# Patient Record
Sex: MALE | Race: WHITE | NOT HISPANIC OR LATINO | Employment: FULL TIME | URBAN - METROPOLITAN AREA
[De-identification: names, ages, dates, MRNs, and addresses within clinical notes are randomized per-mention and may not be internally consistent; named-entity substitution may affect disease eponyms.]

---

## 2018-04-30 ENCOUNTER — OFFICE VISIT CONVERTED (OUTPATIENT)
Dept: FAMILY MEDICINE CLINIC | Facility: CLINIC | Age: 53
End: 2018-04-30
Attending: NURSE PRACTITIONER

## 2018-05-14 ENCOUNTER — OFFICE VISIT CONVERTED (OUTPATIENT)
Dept: FAMILY MEDICINE CLINIC | Facility: CLINIC | Age: 53
End: 2018-05-14
Attending: NURSE PRACTITIONER

## 2018-12-13 ENCOUNTER — OFFICE VISIT CONVERTED (OUTPATIENT)
Dept: FAMILY MEDICINE CLINIC | Facility: CLINIC | Age: 53
End: 2018-12-13
Attending: NURSE PRACTITIONER

## 2019-07-09 ENCOUNTER — OFFICE VISIT CONVERTED (OUTPATIENT)
Dept: FAMILY MEDICINE CLINIC | Facility: CLINIC | Age: 54
End: 2019-07-09
Attending: NURSE PRACTITIONER

## 2019-07-09 ENCOUNTER — HOSPITAL ENCOUNTER (OUTPATIENT)
Dept: FAMILY MEDICINE CLINIC | Facility: CLINIC | Age: 54
Discharge: HOME | End: 2019-07-09
Attending: NURSE PRACTITIONER

## 2019-07-10 LAB
ALBUMIN SERPL-MCNC: 4.6 G/DL (ref 3.5–5)
ALBUMIN/GLOB SERPL: 1.5 {RATIO} (ref 1.4–2.6)
ALP SERPL-CCNC: 39 U/L (ref 56–119)
ALT SERPL-CCNC: 38 U/L (ref 10–40)
ANION GAP SERPL CALC-SCNC: 19 MMOL/L (ref 8–19)
APPEARANCE UR: CLEAR
AST SERPL-CCNC: 34 U/L (ref 15–50)
BASOPHILS # BLD AUTO: 0.03 10*3/UL (ref 0–0.2)
BASOPHILS NFR BLD AUTO: 0.5 % (ref 0–3)
BILIRUB SERPL-MCNC: 0.49 MG/DL (ref 0.2–1.3)
BILIRUB UR QL: NEGATIVE
BUN SERPL-MCNC: 13 MG/DL (ref 5–25)
BUN/CREAT SERPL: 12 {RATIO} (ref 6–20)
CALCIUM SERPL-MCNC: 9.4 MG/DL (ref 8.7–10.4)
CHLORIDE SERPL-SCNC: 95 MMOL/L (ref 99–111)
CHOLEST SERPL-MCNC: 219 MG/DL (ref 107–200)
CHOLEST/HDLC SERPL: 4.3 {RATIO} (ref 3–6)
COLOR UR: YELLOW
CONV ABS IMM GRAN: 0.01 10*3/UL (ref 0–0.2)
CONV CO2: 26 MMOL/L (ref 22–32)
CONV COLLECTION SOURCE (UA): ABNORMAL
CONV CREATININE URINE, RANDOM: 226.5 MG/DL (ref 10–300)
CONV IMMATURE GRAN: 0.2 % (ref 0–1.8)
CONV MICROALBUM.,U,RANDOM: 49.9 MG/L (ref 0–20)
CONV TOTAL PROTEIN: 7.6 G/DL (ref 6.3–8.2)
CONV UROBILINOGEN IN URINE BY AUTOMATED TEST STRIP: 1 {EHRLICHU}/DL (ref 0.1–1)
CREAT UR-MCNC: 1.11 MG/DL (ref 0.7–1.2)
DEPRECATED RDW RBC AUTO: 42.5 FL (ref 35.1–43.9)
EOSINOPHIL # BLD AUTO: 0.07 10*3/UL (ref 0–0.7)
EOSINOPHIL # BLD AUTO: 1.2 % (ref 0–7)
ERYTHROCYTE [DISTWIDTH] IN BLOOD BY AUTOMATED COUNT: 12.3 % (ref 11.6–14.4)
EST. AVERAGE GLUCOSE BLD GHB EST-MCNC: 180 MG/DL
GFR SERPLBLD BASED ON 1.73 SQ M-ARVRAT: >60 ML/MIN/{1.73_M2}
GLOBULIN UR ELPH-MCNC: 3 G/DL (ref 2–3.5)
GLUCOSE SERPL-MCNC: 151 MG/DL (ref 70–99)
GLUCOSE UR QL: 250 MG/DL
HBA1C MFR BLD: 14.1 G/DL (ref 14–18)
HBA1C MFR BLD: 7.9 % (ref 3.5–5.7)
HCT VFR BLD AUTO: 42 % (ref 42–52)
HDLC SERPL-MCNC: 51 MG/DL (ref 40–60)
HGB UR QL STRIP: NEGATIVE
KETONES UR QL STRIP: ABNORMAL MG/DL
LDLC SERPL CALC-MCNC: 123 MG/DL (ref 70–100)
LEUKOCYTE ESTERASE UR QL STRIP: NEGATIVE
LYMPHOCYTES # BLD AUTO: 2.18 10*3/UL (ref 1–5)
MCH RBC QN AUTO: 31.1 PG (ref 27–31)
MCHC RBC AUTO-ENTMCNC: 33.6 G/DL (ref 33–37)
MCV RBC AUTO: 92.7 FL (ref 80–96)
MICROALBUMIN/CREAT UR: 22 MG/G{CRE} (ref 0–25)
MONOCYTES # BLD AUTO: 0.56 10*3/UL (ref 0.2–1.2)
MONOCYTES NFR BLD AUTO: 9.6 % (ref 3–10)
NEUTROPHILS # BLD AUTO: 2.98 10*3/UL (ref 2–8)
NEUTROPHILS NFR BLD AUTO: 51.1 % (ref 30–85)
NITRITE UR QL STRIP: NEGATIVE
NRBC CBCN: 0 % (ref 0–0.7)
OSMOLALITY SERPL CALC.SUM OF ELEC: 285 MOSM/KG (ref 273–304)
PH UR STRIP.AUTO: 7 [PH] (ref 5–8)
PLATELET # BLD AUTO: 192 10*3/UL (ref 130–400)
PMV BLD AUTO: 11.3 FL (ref 9.4–12.4)
POTASSIUM SERPL-SCNC: 4 MMOL/L (ref 3.5–5.3)
PROT UR QL: NEGATIVE MG/DL
RBC # BLD AUTO: 4.53 10*6/UL (ref 4.7–6.1)
SODIUM SERPL-SCNC: 136 MMOL/L (ref 135–147)
SP GR UR: 1.02 (ref 1–1.03)
TRIGL SERPL-MCNC: 227 MG/DL (ref 40–150)
VARIANT LYMPHS NFR BLD MANUAL: 37.4 % (ref 20–45)
VLDLC SERPL-MCNC: 45 MG/DL (ref 5–37)
WBC # BLD AUTO: 5.83 10*3/UL (ref 4.8–10.8)

## 2020-01-21 ENCOUNTER — HOSPITAL ENCOUNTER (OUTPATIENT)
Dept: FAMILY MEDICINE CLINIC | Facility: CLINIC | Age: 55
Discharge: HOME | End: 2020-01-21
Attending: NURSE PRACTITIONER

## 2020-01-21 ENCOUNTER — CONVERSION ENCOUNTER (OUTPATIENT)
Dept: FAMILY MEDICINE CLINIC | Facility: CLINIC | Age: 55
End: 2020-01-21

## 2020-01-21 ENCOUNTER — OFFICE VISIT CONVERTED (OUTPATIENT)
Dept: FAMILY MEDICINE CLINIC | Facility: CLINIC | Age: 55
End: 2020-01-21
Attending: NURSE PRACTITIONER

## 2020-01-21 LAB
APPEARANCE UR: CLEAR
BILIRUB UR QL: NEGATIVE
COLOR UR: YELLOW
CONV COLLECTION SOURCE (UA): ABNORMAL
CONV UROBILINOGEN IN URINE BY AUTOMATED TEST STRIP: 1 {EHRLICHU}/DL (ref 0.1–1)
GLUCOSE UR QL: >=1000 MG/DL
HGB UR QL STRIP: NEGATIVE
KETONES UR QL STRIP: NEGATIVE MG/DL
LEUKOCYTE ESTERASE UR QL STRIP: NEGATIVE
NITRITE UR QL STRIP: NEGATIVE
PH UR STRIP.AUTO: 6.5 [PH] (ref 5–8)
PROT UR QL: ABNORMAL MG/DL
SP GR UR: 1.03 (ref 1–1.03)

## 2020-01-22 LAB
ALBUMIN SERPL-MCNC: 4.2 G/DL (ref 3.5–5)
ALBUMIN/GLOB SERPL: 1.5 {RATIO} (ref 1.4–2.6)
ALP SERPL-CCNC: 39 U/L (ref 56–119)
ALT SERPL-CCNC: 44 U/L (ref 10–40)
ANION GAP SERPL CALC-SCNC: 16 MMOL/L (ref 8–19)
APTT BLD: 24.1 S (ref 22.2–34.2)
AST SERPL-CCNC: 37 U/L (ref 15–50)
BASOPHILS # BLD AUTO: 0.02 10*3/UL (ref 0–0.2)
BASOPHILS NFR BLD AUTO: 0.3 % (ref 0–3)
BILIRUB SERPL-MCNC: 0.41 MG/DL (ref 0.2–1.3)
BUN SERPL-MCNC: 11 MG/DL (ref 5–25)
BUN/CREAT SERPL: 11 {RATIO} (ref 6–20)
CALCIUM SERPL-MCNC: 9.4 MG/DL (ref 8.7–10.4)
CHLORIDE SERPL-SCNC: 97 MMOL/L (ref 99–111)
CHOLEST SERPL-MCNC: 190 MG/DL (ref 107–200)
CHOLEST/HDLC SERPL: 4.4 {RATIO} (ref 3–6)
CONV ABS IMM GRAN: 0.01 10*3/UL (ref 0–0.2)
CONV CO2: 27 MMOL/L (ref 22–32)
CONV IMMATURE GRAN: 0.2 % (ref 0–1.8)
CONV TOTAL PROTEIN: 7 G/DL (ref 6.3–8.2)
CREAT UR-MCNC: 1.04 MG/DL (ref 0.7–1.2)
DEPRECATED RDW RBC AUTO: 38.1 FL (ref 35.1–43.9)
EOSINOPHIL # BLD AUTO: 0.12 10*3/UL (ref 0–0.7)
EOSINOPHIL # BLD AUTO: 2.1 % (ref 0–7)
ERYTHROCYTE [DISTWIDTH] IN BLOOD BY AUTOMATED COUNT: 11.7 % (ref 11.6–14.4)
EST. AVERAGE GLUCOSE BLD GHB EST-MCNC: 192 MG/DL
GFR SERPLBLD BASED ON 1.73 SQ M-ARVRAT: >60 ML/MIN/{1.73_M2}
GLOBULIN UR ELPH-MCNC: 2.8 G/DL (ref 2–3.5)
GLUCOSE SERPL-MCNC: 155 MG/DL (ref 70–99)
HBA1C MFR BLD: 8.3 % (ref 3.5–5.7)
HCT VFR BLD AUTO: 39.2 % (ref 42–52)
HDLC SERPL-MCNC: 43 MG/DL (ref 40–60)
HGB BLD-MCNC: 14.1 G/DL (ref 14–18)
INR PPP: 0.88 (ref 2–3)
LDLC SERPL CALC-MCNC: 115 MG/DL (ref 70–100)
LYMPHOCYTES # BLD AUTO: 2.03 10*3/UL (ref 1–5)
LYMPHOCYTES NFR BLD AUTO: 34.8 % (ref 20–45)
MCH RBC QN AUTO: 32.2 PG (ref 27–31)
MCHC RBC AUTO-ENTMCNC: 36 G/DL (ref 33–37)
MCV RBC AUTO: 89.5 FL (ref 80–96)
MONOCYTES # BLD AUTO: 0.44 10*3/UL (ref 0.2–1.2)
MONOCYTES NFR BLD AUTO: 7.5 % (ref 3–10)
NEUTROPHILS # BLD AUTO: 3.22 10*3/UL (ref 2–8)
NEUTROPHILS NFR BLD AUTO: 55.1 % (ref 30–85)
NRBC CBCN: 0 % (ref 0–0.7)
OSMOLALITY SERPL CALC.SUM OF ELEC: 285 MOSM/KG (ref 273–304)
PLATELET # BLD AUTO: 189 10*3/UL (ref 130–400)
PMV BLD AUTO: 11.5 FL (ref 9.4–12.4)
POTASSIUM SERPL-SCNC: 3.6 MMOL/L (ref 3.5–5.3)
PROTHROMBIN TIME: 9.7 S (ref 9.4–12)
RBC # BLD AUTO: 4.38 10*6/UL (ref 4.7–6.1)
SODIUM SERPL-SCNC: 136 MMOL/L (ref 135–147)
TRIGL SERPL-MCNC: 160 MG/DL (ref 40–150)
VLDLC SERPL-MCNC: 32 MG/DL (ref 5–37)
WBC # BLD AUTO: 5.84 10*3/UL (ref 4.8–10.8)

## 2020-05-15 ENCOUNTER — HOSPITAL ENCOUNTER (OUTPATIENT)
Dept: FAMILY MEDICINE CLINIC | Facility: CLINIC | Age: 55
Discharge: HOME | End: 2020-05-15
Attending: NURSE PRACTITIONER

## 2020-05-15 ENCOUNTER — OFFICE VISIT CONVERTED (OUTPATIENT)
Dept: FAMILY MEDICINE CLINIC | Facility: CLINIC | Age: 55
End: 2020-05-15
Attending: NURSE PRACTITIONER

## 2020-07-30 ENCOUNTER — OFFICE VISIT CONVERTED (OUTPATIENT)
Dept: FAMILY MEDICINE CLINIC | Facility: CLINIC | Age: 55
End: 2020-07-30
Attending: NURSE PRACTITIONER

## 2020-08-01 ENCOUNTER — HOSPITAL ENCOUNTER (OUTPATIENT)
Dept: FAMILY MEDICINE CLINIC | Facility: CLINIC | Age: 55
Discharge: HOME | End: 2020-08-01
Attending: NURSE PRACTITIONER

## 2020-08-01 LAB
ALBUMIN SERPL-MCNC: 4.4 G/DL (ref 3.5–5)
ALBUMIN/GLOB SERPL: 1.6 {RATIO} (ref 1.4–2.6)
ALP SERPL-CCNC: 37 U/L (ref 56–119)
ALT SERPL-CCNC: 43 U/L (ref 10–40)
ANION GAP SERPL CALC-SCNC: 15 MMOL/L (ref 8–19)
APPEARANCE UR: CLEAR
AST SERPL-CCNC: 34 U/L (ref 15–50)
BASOPHILS # BLD AUTO: 0.04 10*3/UL (ref 0–0.2)
BASOPHILS NFR BLD AUTO: 0.8 % (ref 0–3)
BILIRUB SERPL-MCNC: 0.44 MG/DL (ref 0.2–1.3)
BILIRUB UR QL: NEGATIVE
BUN SERPL-MCNC: 17 MG/DL (ref 5–25)
BUN/CREAT SERPL: 13 {RATIO} (ref 6–20)
CALCIUM SERPL-MCNC: 10 MG/DL (ref 8.7–10.4)
CHLORIDE SERPL-SCNC: 97 MMOL/L (ref 99–111)
CHOLEST SERPL-MCNC: 201 MG/DL (ref 107–200)
CHOLEST/HDLC SERPL: 4.3 {RATIO} (ref 3–6)
COLOR UR: YELLOW
CONV ABS IMM GRAN: 0.01 10*3/UL (ref 0–0.2)
CONV CO2: 27 MMOL/L (ref 22–32)
CONV COLLECTION SOURCE (UA): ABNORMAL
CONV CREATININE URINE, RANDOM: 105.2 MG/DL (ref 10–300)
CONV IMMATURE GRAN: 0.2 % (ref 0–1.8)
CONV MICROALBUM.,U,RANDOM: <12 MG/L (ref 0–20)
CONV TOTAL PROTEIN: 7.1 G/DL (ref 6.3–8.2)
CONV UROBILINOGEN IN URINE BY AUTOMATED TEST STRIP: 1 {EHRLICHU}/DL (ref 0.1–1)
CREAT UR-MCNC: 1.34 MG/DL (ref 0.7–1.2)
DEPRECATED RDW RBC AUTO: 41.1 FL (ref 35.1–43.9)
EOSINOPHIL # BLD AUTO: 0.17 10*3/UL (ref 0–0.7)
EOSINOPHIL # BLD AUTO: 3.4 % (ref 0–7)
ERYTHROCYTE [DISTWIDTH] IN BLOOD BY AUTOMATED COUNT: 12.5 % (ref 11.6–14.4)
EST. AVERAGE GLUCOSE BLD GHB EST-MCNC: 189 MG/DL
GFR SERPLBLD BASED ON 1.73 SQ M-ARVRAT: 59 ML/MIN/{1.73_M2}
GLOBULIN UR ELPH-MCNC: 2.7 G/DL (ref 2–3.5)
GLUCOSE SERPL-MCNC: 186 MG/DL (ref 70–99)
GLUCOSE UR QL: >=1000 MG/DL
HBA1C MFR BLD: 8.2 % (ref 3.5–5.7)
HCT VFR BLD AUTO: 42 % (ref 42–52)
HDLC SERPL-MCNC: 47 MG/DL (ref 40–60)
HGB BLD-MCNC: 14.4 G/DL (ref 14–18)
HGB UR QL STRIP: NEGATIVE
KETONES UR QL STRIP: NEGATIVE MG/DL
LDLC SERPL CALC-MCNC: 124 MG/DL (ref 70–100)
LEUKOCYTE ESTERASE UR QL STRIP: NEGATIVE
LYMPHOCYTES # BLD AUTO: 2.18 10*3/UL (ref 1–5)
LYMPHOCYTES NFR BLD AUTO: 43.3 % (ref 20–45)
MCH RBC QN AUTO: 31.5 PG (ref 27–31)
MCHC RBC AUTO-ENTMCNC: 34.3 G/DL (ref 33–37)
MCV RBC AUTO: 91.9 FL (ref 80–96)
MICROALBUMIN/CREAT UR: 11.4 MG/G{CRE} (ref 0–25)
MONOCYTES # BLD AUTO: 0.52 10*3/UL (ref 0.2–1.2)
MONOCYTES NFR BLD AUTO: 10.3 % (ref 3–10)
NEUTROPHILS # BLD AUTO: 2.11 10*3/UL (ref 2–8)
NEUTROPHILS NFR BLD AUTO: 42 % (ref 30–85)
NITRITE UR QL STRIP: NEGATIVE
NRBC CBCN: 0 % (ref 0–0.7)
OSMOLALITY SERPL CALC.SUM OF ELEC: 286 MOSM/KG (ref 273–304)
PH UR STRIP.AUTO: 6.5 [PH] (ref 5–8)
PLATELET # BLD AUTO: 179 10*3/UL (ref 130–400)
PMV BLD AUTO: 10.6 FL (ref 9.4–12.4)
POTASSIUM SERPL-SCNC: 4.3 MMOL/L (ref 3.5–5.3)
PROT UR QL: NEGATIVE MG/DL
RBC # BLD AUTO: 4.57 10*6/UL (ref 4.7–6.1)
SODIUM SERPL-SCNC: 135 MMOL/L (ref 135–147)
SP GR UR: 1.02 (ref 1–1.03)
T4 FREE SERPL-MCNC: 1.2 NG/DL (ref 0.9–1.8)
TRIGL SERPL-MCNC: 148 MG/DL (ref 40–150)
TSH SERPL-ACNC: 1.97 M[IU]/L (ref 0.27–4.2)
VLDLC SERPL-MCNC: 30 MG/DL (ref 5–37)
WBC # BLD AUTO: 5.03 10*3/UL (ref 4.8–10.8)

## 2021-02-09 ENCOUNTER — OFFICE VISIT CONVERTED (OUTPATIENT)
Dept: FAMILY MEDICINE CLINIC | Facility: CLINIC | Age: 56
End: 2021-02-09
Attending: NURSE PRACTITIONER

## 2021-02-20 ENCOUNTER — CONVERSION ENCOUNTER (OUTPATIENT)
Dept: FAMILY MEDICINE CLINIC | Facility: CLINIC | Age: 56
End: 2021-02-20

## 2021-02-20 ENCOUNTER — HOSPITAL ENCOUNTER (OUTPATIENT)
Dept: FAMILY MEDICINE CLINIC | Facility: CLINIC | Age: 56
Discharge: HOME | End: 2021-02-20
Attending: NURSE PRACTITIONER

## 2021-02-20 LAB
ALBUMIN SERPL-MCNC: 4.3 G/DL (ref 3.5–5)
ALBUMIN/GLOB SERPL: 1.5 {RATIO} (ref 1.4–2.6)
ALP SERPL-CCNC: 49 U/L (ref 56–119)
ALT SERPL-CCNC: 36 U/L (ref 10–40)
ANION GAP SERPL CALC-SCNC: 19 MMOL/L (ref 8–19)
APPEARANCE UR: CLEAR
AST SERPL-CCNC: 26 U/L (ref 15–50)
BASOPHILS # BLD AUTO: 0.03 10*3/UL (ref 0–0.2)
BASOPHILS NFR BLD AUTO: 0.7 % (ref 0–3)
BILIRUB SERPL-MCNC: 0.35 MG/DL (ref 0.2–1.3)
BILIRUB UR QL: NEGATIVE
BUN SERPL-MCNC: 18 MG/DL (ref 5–25)
BUN/CREAT SERPL: 17 {RATIO} (ref 6–20)
CALCIUM SERPL-MCNC: 9 MG/DL (ref 8.7–10.4)
CHLORIDE SERPL-SCNC: 98 MMOL/L (ref 99–111)
CHOLEST SERPL-MCNC: 184 MG/DL (ref 107–200)
CHOLEST/HDLC SERPL: 4.2 {RATIO} (ref 3–6)
COLOR UR: YELLOW
CONV ABS IMM GRAN: 0.01 10*3/UL (ref 0–0.2)
CONV CO2: 23 MMOL/L (ref 22–32)
CONV COLLECTION SOURCE (UA): ABNORMAL
CONV IMMATURE GRAN: 0.2 % (ref 0–1.8)
CONV TOTAL PROTEIN: 7.1 G/DL (ref 6.3–8.2)
CONV UROBILINOGEN IN URINE BY AUTOMATED TEST STRIP: 0.2 {EHRLICHU}/DL (ref 0.1–1)
CREAT UR-MCNC: 1.06 MG/DL (ref 0.7–1.2)
DEPRECATED RDW RBC AUTO: 36.3 FL (ref 35.1–43.9)
EOSINOPHIL # BLD AUTO: 0.18 10*3/UL (ref 0–0.7)
EOSINOPHIL # BLD AUTO: 3.9 % (ref 0–7)
ERYTHROCYTE [DISTWIDTH] IN BLOOD BY AUTOMATED COUNT: 11.6 % (ref 11.6–14.4)
EST. AVERAGE GLUCOSE BLD GHB EST-MCNC: 226 MG/DL
FOLATE SERPL-MCNC: 15.2 NG/ML (ref 4.8–20)
GFR SERPLBLD BASED ON 1.73 SQ M-ARVRAT: >60 ML/MIN/{1.73_M2}
GLOBULIN UR ELPH-MCNC: 2.8 G/DL (ref 2–3.5)
GLUCOSE SERPL-MCNC: 231 MG/DL (ref 70–99)
GLUCOSE UR QL: >=1000 MG/DL
HBA1C MFR BLD: 9.5 % (ref 3.5–5.7)
HCT VFR BLD AUTO: 41.7 % (ref 42–52)
HDLC SERPL-MCNC: 44 MG/DL (ref 40–60)
HGB BLD-MCNC: 15 G/DL (ref 14–18)
HGB UR QL STRIP: NEGATIVE
KETONES UR QL STRIP: NEGATIVE MG/DL
LDLC SERPL CALC-MCNC: 110 MG/DL (ref 70–100)
LEUKOCYTE ESTERASE UR QL STRIP: NEGATIVE
LYMPHOCYTES # BLD AUTO: 1.72 10*3/UL (ref 1–5)
LYMPHOCYTES NFR BLD AUTO: 37.6 % (ref 20–45)
MCH RBC QN AUTO: 31.1 PG (ref 27–31)
MCHC RBC AUTO-ENTMCNC: 36 G/DL (ref 33–37)
MCV RBC AUTO: 86.5 FL (ref 80–96)
MONOCYTES # BLD AUTO: 0.43 10*3/UL (ref 0.2–1.2)
MONOCYTES NFR BLD AUTO: 9.4 % (ref 3–10)
NEUTROPHILS # BLD AUTO: 2.21 10*3/UL (ref 2–8)
NEUTROPHILS NFR BLD AUTO: 48.2 % (ref 30–85)
NITRITE UR QL STRIP: NEGATIVE
NRBC CBCN: 0 % (ref 0–0.7)
OSMOLALITY SERPL CALC.SUM OF ELEC: 291 MOSM/KG (ref 273–304)
PH UR STRIP.AUTO: 6.5 [PH] (ref 5–8)
PLATELET # BLD AUTO: 167 10*3/UL (ref 130–400)
PMV BLD AUTO: 10.4 FL (ref 9.4–12.4)
POTASSIUM SERPL-SCNC: 3.8 MMOL/L (ref 3.5–5.3)
PROT UR QL: NEGATIVE MG/DL
PSA SERPL-MCNC: 0.46 NG/ML (ref 0–4)
RBC # BLD AUTO: 4.82 10*6/UL (ref 4.7–6.1)
SODIUM SERPL-SCNC: 136 MMOL/L (ref 135–147)
SP GR UR: 1.02 (ref 1–1.03)
TRIGL SERPL-MCNC: 152 MG/DL (ref 40–150)
VIT B12 SERPL-MCNC: 363 PG/ML (ref 211–911)
VLDLC SERPL-MCNC: 30 MG/DL (ref 5–37)
WBC # BLD AUTO: 4.58 10*3/UL (ref 4.8–10.8)

## 2021-05-09 VITALS
SYSTOLIC BLOOD PRESSURE: 150 MMHG | HEART RATE: 87 BPM | DIASTOLIC BLOOD PRESSURE: 96 MMHG | TEMPERATURE: 98 F | OXYGEN SATURATION: 98 % | WEIGHT: 212 LBS

## 2021-05-09 VITALS
HEIGHT: 67 IN | WEIGHT: 200.37 LBS | HEART RATE: 90 BPM | SYSTOLIC BLOOD PRESSURE: 104 MMHG | OXYGEN SATURATION: 98 % | BODY MASS INDEX: 31.45 KG/M2 | TEMPERATURE: 96.9 F | DIASTOLIC BLOOD PRESSURE: 68 MMHG

## 2021-05-09 VITALS
BODY MASS INDEX: 30.77 KG/M2 | TEMPERATURE: 96.7 F | HEIGHT: 68 IN | HEART RATE: 101 BPM | SYSTOLIC BLOOD PRESSURE: 120 MMHG | DIASTOLIC BLOOD PRESSURE: 84 MMHG | WEIGHT: 203 LBS | OXYGEN SATURATION: 95 %

## 2021-05-09 VITALS — SYSTOLIC BLOOD PRESSURE: 116 MMHG | DIASTOLIC BLOOD PRESSURE: 78 MMHG

## 2021-05-09 VITALS
OXYGEN SATURATION: 96 % | BODY MASS INDEX: 30.62 KG/M2 | SYSTOLIC BLOOD PRESSURE: 118 MMHG | HEART RATE: 80 BPM | WEIGHT: 202 LBS | HEIGHT: 68 IN | DIASTOLIC BLOOD PRESSURE: 78 MMHG | TEMPERATURE: 97.8 F

## 2021-05-09 VITALS
DIASTOLIC BLOOD PRESSURE: 80 MMHG | TEMPERATURE: 97.6 F | SYSTOLIC BLOOD PRESSURE: 130 MMHG | HEART RATE: 87 BPM | BODY MASS INDEX: 31.71 KG/M2 | HEIGHT: 68 IN | WEIGHT: 209.25 LBS | OXYGEN SATURATION: 98 %

## 2021-05-09 VITALS
TEMPERATURE: 97.6 F | HEART RATE: 101 BPM | WEIGHT: 207 LBS | SYSTOLIC BLOOD PRESSURE: 136 MMHG | OXYGEN SATURATION: 98 % | DIASTOLIC BLOOD PRESSURE: 86 MMHG

## 2021-05-09 VITALS
HEART RATE: 80 BPM | TEMPERATURE: 98.2 F | SYSTOLIC BLOOD PRESSURE: 128 MMHG | WEIGHT: 207 LBS | OXYGEN SATURATION: 94 % | DIASTOLIC BLOOD PRESSURE: 92 MMHG

## 2021-05-09 VITALS
TEMPERATURE: 97 F | SYSTOLIC BLOOD PRESSURE: 122 MMHG | HEART RATE: 91 BPM | HEIGHT: 68 IN | OXYGEN SATURATION: 97 % | WEIGHT: 204.37 LBS | DIASTOLIC BLOOD PRESSURE: 84 MMHG | BODY MASS INDEX: 30.97 KG/M2

## 2021-07-15 ENCOUNTER — OFFICE VISIT (OUTPATIENT)
Dept: FAMILY MEDICINE CLINIC | Facility: CLINIC | Age: 56
End: 2021-07-15

## 2021-07-15 VITALS
DIASTOLIC BLOOD PRESSURE: 74 MMHG | WEIGHT: 194 LBS | HEIGHT: 67 IN | SYSTOLIC BLOOD PRESSURE: 110 MMHG | OXYGEN SATURATION: 97 % | BODY MASS INDEX: 30.45 KG/M2 | HEART RATE: 95 BPM | TEMPERATURE: 97.5 F

## 2021-07-15 DIAGNOSIS — E11.9 ENCOUNTER FOR DIABETIC FOOT EXAM (HCC): ICD-10-CM

## 2021-07-15 DIAGNOSIS — E11.9 TYPE 2 DIABETES MELLITUS WITHOUT COMPLICATION, WITHOUT LONG-TERM CURRENT USE OF INSULIN (HCC): Primary | ICD-10-CM

## 2021-07-15 DIAGNOSIS — M21.611 BUNION OF RIGHT FOOT: ICD-10-CM

## 2021-07-15 DIAGNOSIS — I10 ESSENTIAL HYPERTENSION: ICD-10-CM

## 2021-07-15 DIAGNOSIS — E78.5 HYPERLIPIDEMIA, UNSPECIFIED HYPERLIPIDEMIA TYPE: ICD-10-CM

## 2021-07-15 DIAGNOSIS — F17.220 CHEWING TOBACCO NICOTINE DEPENDENCE WITHOUT COMPLICATION: ICD-10-CM

## 2021-07-15 PROBLEM — L85.3 DRY SKIN DERMATITIS: Status: RESOLVED | Noted: 2021-07-15 | Resolved: 2021-07-15

## 2021-07-15 PROBLEM — L85.3 DRY SKIN DERMATITIS: Status: ACTIVE | Noted: 2021-07-15

## 2021-07-15 PROBLEM — N50.9 DISORDER OF MALE GENITAL ORGANS: Status: ACTIVE | Noted: 2021-07-15

## 2021-07-15 PROBLEM — N50.9 DISORDER OF MALE GENITAL ORGANS: Status: RESOLVED | Noted: 2021-07-15 | Resolved: 2021-07-15

## 2021-07-15 LAB
ALBUMIN UR-MCNC: <1.2 MG/DL
BASOPHILS # BLD AUTO: 0.03 10*3/MM3 (ref 0–0.2)
BASOPHILS NFR BLD AUTO: 0.5 % (ref 0–1.5)
BILIRUB UR QL STRIP: NEGATIVE
CLARITY UR: CLEAR
COLOR UR: YELLOW
DEPRECATED RDW RBC AUTO: 42.5 FL (ref 37–54)
EOSINOPHIL # BLD AUTO: 0.11 10*3/MM3 (ref 0–0.4)
EOSINOPHIL NFR BLD AUTO: 1.7 % (ref 0.3–6.2)
ERYTHROCYTE [DISTWIDTH] IN BLOOD BY AUTOMATED COUNT: 12.7 % (ref 12.3–15.4)
GLUCOSE UR STRIP-MCNC: ABNORMAL MG/DL
HBA1C MFR BLD: 8.07 % (ref 4.8–5.6)
HCT VFR BLD AUTO: 42.8 % (ref 37.5–51)
HGB BLD-MCNC: 14.5 G/DL (ref 13–17.7)
HGB UR QL STRIP.AUTO: NEGATIVE
IMM GRANULOCYTES # BLD AUTO: 0.01 10*3/MM3 (ref 0–0.05)
IMM GRANULOCYTES NFR BLD AUTO: 0.2 % (ref 0–0.5)
KETONES UR QL STRIP: NEGATIVE
LEUKOCYTE ESTERASE UR QL STRIP.AUTO: NEGATIVE
LYMPHOCYTES # BLD AUTO: 2.27 10*3/MM3 (ref 0.7–3.1)
LYMPHOCYTES NFR BLD AUTO: 34.4 % (ref 19.6–45.3)
MCH RBC QN AUTO: 31 PG (ref 26.6–33)
MCHC RBC AUTO-ENTMCNC: 33.9 G/DL (ref 31.5–35.7)
MCV RBC AUTO: 91.5 FL (ref 79–97)
MONOCYTES # BLD AUTO: 0.47 10*3/MM3 (ref 0.1–0.9)
MONOCYTES NFR BLD AUTO: 7.1 % (ref 5–12)
NEUTROPHILS NFR BLD AUTO: 3.7 10*3/MM3 (ref 1.7–7)
NEUTROPHILS NFR BLD AUTO: 56.1 % (ref 42.7–76)
NITRITE UR QL STRIP: NEGATIVE
NRBC BLD AUTO-RTO: 0 /100 WBC (ref 0–0.2)
PH UR STRIP.AUTO: 6.5 [PH] (ref 5–8)
PLATELET # BLD AUTO: 175 10*3/MM3 (ref 140–450)
PMV BLD AUTO: 11 FL (ref 6–12)
PROT UR QL STRIP: NEGATIVE
RBC # BLD AUTO: 4.68 10*6/MM3 (ref 4.14–5.8)
SP GR UR STRIP: 1.02 (ref 1–1.03)
UROBILINOGEN UR QL STRIP: ABNORMAL
WBC # BLD AUTO: 6.59 10*3/MM3 (ref 3.4–10.8)

## 2021-07-15 PROCEDURE — 83036 HEMOGLOBIN GLYCOSYLATED A1C: CPT | Performed by: NURSE PRACTITIONER

## 2021-07-15 PROCEDURE — 99214 OFFICE O/P EST MOD 30 MIN: CPT | Performed by: NURSE PRACTITIONER

## 2021-07-15 PROCEDURE — 84443 ASSAY THYROID STIM HORMONE: CPT | Performed by: NURSE PRACTITIONER

## 2021-07-15 PROCEDURE — 81003 URINALYSIS AUTO W/O SCOPE: CPT | Performed by: NURSE PRACTITIONER

## 2021-07-15 PROCEDURE — 80061 LIPID PANEL: CPT | Performed by: NURSE PRACTITIONER

## 2021-07-15 PROCEDURE — 80053 COMPREHEN METABOLIC PANEL: CPT | Performed by: NURSE PRACTITIONER

## 2021-07-15 PROCEDURE — 82043 UR ALBUMIN QUANTITATIVE: CPT | Performed by: NURSE PRACTITIONER

## 2021-07-15 PROCEDURE — 85025 COMPLETE CBC W/AUTO DIFF WBC: CPT | Performed by: NURSE PRACTITIONER

## 2021-07-15 RX ORDER — HYDROCHLOROTHIAZIDE 12.5 MG/1
12.5 TABLET ORAL DAILY
Qty: 90 TABLET | Refills: 1 | Status: SHIPPED | OUTPATIENT
Start: 2021-07-15 | End: 2022-01-17

## 2021-07-15 RX ORDER — NICOTINE 21 MG/24HR
1 PATCH, TRANSDERMAL 24 HOURS TRANSDERMAL EVERY 24 HOURS
Qty: 14 PATCH | Refills: 1 | Status: SHIPPED | OUTPATIENT
Start: 2021-07-15 | End: 2022-01-20

## 2021-07-15 RX ORDER — LOVASTATIN 40 MG/1
40 TABLET ORAL
Qty: 90 TABLET | Refills: 1 | Status: SHIPPED | OUTPATIENT
Start: 2021-07-15 | End: 2022-01-17

## 2021-07-15 RX ORDER — LISINOPRIL 10 MG/1
10 TABLET ORAL DAILY
Qty: 90 TABLET | Refills: 1 | Status: SHIPPED | OUTPATIENT
Start: 2021-07-15 | End: 2022-01-17

## 2021-07-15 RX ORDER — LOVASTATIN 40 MG/1
40 TABLET ORAL
COMMUNITY
Start: 2021-05-05 | End: 2021-07-15 | Stop reason: SDUPTHER

## 2021-07-15 RX ORDER — HYDROCHLOROTHIAZIDE 25 MG/1
25 TABLET ORAL DAILY
COMMUNITY
Start: 2021-05-05 | End: 2021-07-15 | Stop reason: SDUPTHER

## 2021-07-15 RX ORDER — LISINOPRIL 10 MG/1
10 TABLET ORAL DAILY
COMMUNITY
Start: 2021-05-05 | End: 2021-07-15 | Stop reason: SDUPTHER

## 2021-07-15 RX ORDER — NICOTINE 21 MG/24HR
1 PATCH, TRANSDERMAL 24 HOURS TRANSDERMAL EVERY 24 HOURS
Qty: 14 PATCH | Refills: 0 | Status: SHIPPED | OUTPATIENT
Start: 2021-07-15 | End: 2022-01-20

## 2021-07-15 NOTE — PROGRESS NOTES
"Chief Complaint  No chief complaint on file.    PHQ-2 Total Score: 0    Subjective        Past Medical History:   Diagnosis Date   • Atypical rash    • Diabetes mellitus, type 2 (CMS/HCC)    • Disorder of male genital organs    • Dry skin dermatitis    • Elevated glucose    • Essential hypertension 07/30/2020   • Glucosuria    • Hyperlipidemia      Social History     Tobacco Use   • Smoking status: Never Smoker   • Smokeless tobacco: Current User     Types: Snuff   • Tobacco comment: 3/4 can per day; does not smoke   Vaping Use   • Vaping Use: Never used   Substance Use Topics   • Alcohol use: Yes     Comment: current some day    • Drug use: Not on file      No current outpatient medications on file prior to visit.     No current facility-administered medications on file prior to visit.      No Known Allergies   Health Maintenance Due   Topic Date Due   • COLORECTAL CANCER SCREENING  Never done   • ANNUAL PHYSICAL  Never done   • Pneumococcal Vaccine 0-64 (1 of 1 - PPSV23) Never done   • COVID-19 Vaccine (1) Never done   • Hepatitis B (1 of 3 - Risk 3-dose series) Never done   • TDAP/TD VACCINES (1 - Tdap) Never done   • ZOSTER VACCINE (1 of 2) Never done   • HEPATITIS C SCREENING  Never done      Glenroy Penaloza presents to [facility]  DM: states after his last A1C was instructed to increase to 1000mg twice daily of Metformin but noted significant diarrhea 3x/day - he is currently taking 1000mg in the morning and will occasionally take 1000mg in the evening - does not monitor glucose - no s/s of feeling of low or high glucose     HTN: BP has been lower on his most recent checks - notes 40lb weight loss over the past year - pt has reduced dietary intake    Hyperlipidemia: denies SE of medication - no muscle aches or cramps       Objective   Vital Signs:   /74   Pulse 95   Temp 97.5 °F (36.4 °C)   Ht 170.2 cm (67\")   Wt 88 kg (194 lb)   SpO2 97%   BMI 30.38 kg/m²     Review " of Systems   Cardiovascular: Negative for chest pain and palpitations.   Neurological: Negative for dizziness, light-headedness and headache.      Physical Exam  Vitals reviewed.   Constitutional:       Appearance: Normal appearance. He is well-developed.   HENT:      Head: Normocephalic and atraumatic.      Right Ear: External ear normal.      Left Ear: External ear normal.   Eyes:      Conjunctiva/sclera: Conjunctivae normal.      Pupils: Pupils are equal, round, and reactive to light.   Cardiovascular:      Rate and Rhythm: Normal rate and regular rhythm.      Pulses:           Dorsalis pedis pulses are 2+ on the right side and 2+ on the left side.      Heart sounds: No murmur heard.     Pulmonary:      Effort: Pulmonary effort is normal.      Breath sounds: Normal breath sounds. No wheezing or rhonchi.   Musculoskeletal:      Cervical back: Neck supple.      Right lower leg: No edema.      Left lower leg: No edema.      Right foot: Bunion present.      Left foot: No bunion.        Feet:    Feet:      Right foot:      Protective Sensation: 9 sites tested. 9 sites sensed.      Skin integrity: No ulcer, blister or callus.      Toenail Condition: Right toenails are normal.      Left foot:      Protective Sensation: 9 sites tested. 9 sites sensed.      Skin integrity: Skin integrity normal. No ulcer, blister or callus.      Toenail Condition: Left toenails are normal.      Comments:      Skin:     General: Skin is warm and dry.   Neurological:      Mental Status: He is alert and oriented to person, place, and time.      Cranial Nerves: No cranial nerve deficit.   Psychiatric:         Mood and Affect: Mood and affect normal.         Behavior: Behavior normal.         Thought Content: Thought content normal.         Judgment: Judgment normal.        Result Review :                 Assessment and Plan    Diagnoses and all orders for this visit:    1. Type 2 diabetes mellitus without complication, without long-term  current use of insulin (CMS/[facility]) (Primary)  -     metFORMIN (GLUCOPHAGE) 500 MG tablet; Take 2 tablets in the AM and 1 tablet in the evening  Dispense: 360 tablet; Refill: 0  -     Urinalysis With Culture If Indicated - Urine, Clean Catch  -     CBC & Differential  -     Comprehensive Metabolic Panel  -     Hemoglobin A1c  -     Lipid Panel  -     MicroAlbumin, Urine, Random - Urine, Clean Catch  -     TSH Rfx On Abnormal To Free T4    2. Essential hypertension  -     hydroCHLOROthiazide (HYDRODIURIL) 12.5 MG tablet; Take 1 tablet by mouth Daily.  Dispense: 90 tablet; Refill: 1  -     lisinopril (PRINIVIL,ZESTRIL) 10 MG tablet; Take 1 tablet by mouth Daily.  Dispense: 90 tablet; Refill: 1    3. Hyperlipidemia, unspecified hyperlipidemia type  -     lovastatin (MEVACOR) 40 MG tablet; Take 1 tablet by mouth Daily With Dinner.  Dispense: 90 tablet; Refill: 1  -     Lipid Panel    4. Chewing tobacco nicotine dependence without complication  -     nicotine (Nicoderm CQ) 21 MG/24HR patch; Place 1 patch on the skin as directed by provider Daily.  Dispense: 14 patch; Refill: 0  -     nicotine (Nicoderm CQ) 14 MG/24HR patch; Place 1 patch on the skin as directed by provider Daily.  Dispense: 14 patch; Refill: 1  -     nicotine (Nicoderm CQ) 7 MG/24HR patch; Place 1 patch on the skin as directed by provider Daily.  Dispense: 14 patch; Refill: 1    5. Bunion of right foot  -     Ambulatory Referral to Podiatry    6. Encounter for diabetic foot exam (CMS/[facility])        Follow Up   Return in about 3 months (around 10/15/2021) for Next scheduled follow up.  Patient was given instructions and counseling regarding his condition or for health maintenance advice. Please see specific information pulled into the AVS if appropriate.

## 2021-07-15 NOTE — PROGRESS NOTES
Venipuncture Blood Specimen Collection  Venipuncture performed in left arm by Rosalva Askew with good hemostasis. Patient tolerated the procedure well without complications.   07/15/21   Rosalva Askew

## 2021-07-16 LAB
ALBUMIN SERPL-MCNC: 4.4 G/DL (ref 3.5–5.2)
ALBUMIN/GLOB SERPL: 1.6 G/DL
ALP SERPL-CCNC: 39 U/L (ref 39–117)
ALT SERPL W P-5'-P-CCNC: 31 U/L (ref 1–41)
ANION GAP SERPL CALCULATED.3IONS-SCNC: 14.2 MMOL/L (ref 5–15)
AST SERPL-CCNC: 24 U/L (ref 1–40)
BILIRUB SERPL-MCNC: 0.4 MG/DL (ref 0–1.2)
BUN SERPL-MCNC: 14 MG/DL (ref 6–20)
BUN/CREAT SERPL: 12.5 (ref 7–25)
CALCIUM SPEC-SCNC: 9.2 MG/DL (ref 8.6–10.5)
CHLORIDE SERPL-SCNC: 96 MMOL/L (ref 98–107)
CHOLEST SERPL-MCNC: 184 MG/DL (ref 0–200)
CO2 SERPL-SCNC: 25.8 MMOL/L (ref 22–29)
CREAT SERPL-MCNC: 1.12 MG/DL (ref 0.76–1.27)
GFR SERPL CREATININE-BSD FRML MDRD: 68 ML/MIN/1.73
GLOBULIN UR ELPH-MCNC: 2.7 GM/DL
GLUCOSE SERPL-MCNC: 154 MG/DL (ref 65–99)
HDLC SERPL-MCNC: 47 MG/DL (ref 40–60)
LDLC SERPL CALC-MCNC: 103 MG/DL (ref 0–100)
LDLC/HDLC SERPL: 2.09 {RATIO}
POTASSIUM SERPL-SCNC: 3.4 MMOL/L (ref 3.5–5.2)
PROT SERPL-MCNC: 7.1 G/DL (ref 6–8.5)
SODIUM SERPL-SCNC: 136 MMOL/L (ref 136–145)
TRIGL SERPL-MCNC: 195 MG/DL (ref 0–150)
TSH SERPL DL<=0.05 MIU/L-ACNC: 1.27 UIU/ML (ref 0.27–4.2)
VLDLC SERPL-MCNC: 34 MG/DL (ref 5–40)

## 2021-07-27 DIAGNOSIS — I10 ESSENTIAL HYPERTENSION: ICD-10-CM

## 2021-07-27 RX ORDER — HYDROCHLOROTHIAZIDE 25 MG/1
TABLET ORAL
Qty: 90 TABLET | OUTPATIENT
Start: 2021-07-27

## 2021-07-27 RX ORDER — LISINOPRIL 10 MG/1
TABLET ORAL
Qty: 90 TABLET | Refills: 1 | OUTPATIENT
Start: 2021-07-27

## 2021-09-17 ENCOUNTER — OFFICE VISIT (OUTPATIENT)
Dept: PODIATRY | Facility: CLINIC | Age: 56
End: 2021-09-17

## 2021-09-17 VITALS
DIASTOLIC BLOOD PRESSURE: 71 MMHG | SYSTOLIC BLOOD PRESSURE: 117 MMHG | HEART RATE: 60 BPM | TEMPERATURE: 96.9 F | WEIGHT: 203 LBS | HEIGHT: 68 IN | BODY MASS INDEX: 30.77 KG/M2 | OXYGEN SATURATION: 100 %

## 2021-09-17 DIAGNOSIS — M20.11 HALLUX VALGUS OF RIGHT FOOT: ICD-10-CM

## 2021-09-17 DIAGNOSIS — M20.41 HAMMER TOE OF RIGHT FOOT: ICD-10-CM

## 2021-09-17 DIAGNOSIS — M79.671 FOOT PAIN, RIGHT: Primary | ICD-10-CM

## 2021-09-17 PROCEDURE — 99203 OFFICE O/P NEW LOW 30 MIN: CPT | Performed by: PODIATRIST

## 2021-09-17 PROCEDURE — 73630 X-RAY EXAM OF FOOT: CPT | Performed by: PODIATRIST

## 2021-09-17 NOTE — PROGRESS NOTES
[facility] - PODIATRY    Today's Date: 09/17/21    Patient Name: Glenroy Penaloza  MRN: [id number]  CSN: [id number]  PCP: Rosie Vela APRN  Referring Provider: Rosie Vela APRN    SUBJECTIVE     Chief Complaint   Patient presents with   • Right Foot - Bunions     HPI: Glenroy Penaloza, a 56 y.o.male, presents to clinic.    New, Established, New Problem: New    Location: Right first ray bunion and right second hammertoe    Duration: Summer 2020    Onset: Insidious    Nature: Minimal pain    Stable, worsening, improving: Stable    Aggravating factors:  Patient relates pain is aggravated by shoe gear and ambulation.      Previous Treatment: Change in shoes    Patient denies any fevers, chills, nausea, vomiting, shortness of breath, nor any other constitutional signs nor symptoms.    No other pedal complaints at this time.    Recent medical changes: None    Past Medical History:   Diagnosis Date   • Atypical rash    • Diabetes mellitus, type 2 (CMS/HCC)    • Disorder of male genital organs    • Dry skin dermatitis    • Elevated glucose    • Essential hypertension 07/30/2020   • Glucosuria    • Hyperlipidemia      History reviewed. No pertinent surgical history.  Family History   Problem Relation Age of Onset   • Cancer Other      Social History     Socioeconomic History   • Marital status:      Spouse name: Not on file   • Number of children: Not on file   • Years of education: Not on file   • Highest education level: Not on file   Tobacco Use   • Smoking status: Never Smoker   • Smokeless tobacco: Current User     Types: Snuff   • Tobacco comment: 3/4 can per day; does not smoke   Vaping Use   • Vaping Use: Never used   Substance and Sexual Activity   • Alcohol use: Yes     Comment: current some day    • Drug use: Never     No Known Allergies  Current Outpatient Medications   Medication Sig Dispense Refill   • hydroCHLOROthiazide (HYDRODIURIL) 12.5 MG tablet Take 1 tablet by mouth Daily. 90  tablet 1   • lisinopril (PRINIVIL,ZESTRIL) 10 MG tablet Take 1 tablet by mouth Daily. 90 tablet 1   • lovastatin (MEVACOR) 40 MG tablet Take 1 tablet by mouth Daily With Dinner. 90 tablet 1   • metFORMIN (GLUCOPHAGE) 500 MG tablet Take 2 tablets in the AM and 1 tablet in the evening 360 tablet 0   • nicotine (Nicoderm CQ) 14 MG/24HR patch Place 1 patch on the skin as directed by provider Daily. 14 patch 1   • nicotine (Nicoderm CQ) 21 MG/24HR patch Place 1 patch on the skin as directed by provider Daily. 14 patch 0   • nicotine (Nicoderm CQ) 7 MG/24HR patch Place 1 patch on the skin as directed by provider Daily. 14 patch 1     No current facility-administered medications for this visit.     Review of Systems   Musculoskeletal:        Right foot first MPJ bunion and right second hammertoe       OBJECTIVE     Vitals:    09/17/21 0754   BP: 117/71   Pulse: 60   Temp: 96.9 °F (36.1 °C)   SpO2: 100%       Patient seen in no apparent distress.      PHYSICAL EXAM:     Foot/Ankle Exam:       General:   Diabetic Foot Exam Performed    Appearance: appears stated age and healthy    Orientation: AAOx3    Affect: appropriate    Gait: unimpaired    Shoe Gear:  Casual shoes    VASCULAR      Right Foot Vascularity   Normal vascular exam    Dorsalis pedis:  2+  Posterior tibial:  2+  Skin Temperature: warm    Edema Grading:  None  CFT:  < 3 seconds  Pedal Hair Growth:  Present  Varicosities: none       Left Foot Vascularity   Normal vascular exam    Dorsalis pedis:  2+  Posterior tibial:  2+  Skin Temperature: warm    Edema Grading:  None  CFT:  < 3 seconds  Pedal Hair Growth:  Present  Varicosities: none        NEUROLOGIC     Right Foot Neurologic   Normal sensation    Light touch sensation:  Normal  Vibratory sensation:  Normal  Hot/Cold sensation: normal    Protective Sensation using [place]-Chris Monofilament:  10     Left Foot Neurologic   Normal sensation    Light touch sensation:  Normal  Vibratory sensation:   Normal  Hot/cold sensation: normal    Protective Sensation using [place]Chris Monofilament:  10     MUSCULOSKELETAL      Right Foot Musculoskeletal   Hammertoe:  Second toe  Hallux valgus: Yes       MUSCLE STRENGTH     Right Foot Muscle Strength   Normal strength    Foot dorsiflexion:  5  Foot plantar flexion:  5  Foot inversion:  5  Foot eversion:  5     Left Foot Muscle Strength   Foot dorsiflexion:  5  Foot plantar flexion:  5  Foot inversion:  5  Foot eversion:  5     RANGE OF MOTION      Right Foot Range of Motion   Foot and ankle ROM within normal limits       Left Foot Range of Motion   Foot and ankle ROM within normal limits       DERMATOLOGIC     Right Foot Dermatologic   Skin: skin intact    Nails: normal       Left Foot Dermatologic   Skin: skin intact    Nails: normal        RADIOLOGY:      XR Foot 3+ View Right    Result Date: 9/17/2021  Narrative: IN-OFFICE IMAGING:  3 view, AP, MO, Lateral, right foot Indication: Bunion pain Findings: Medial deviation of the first metatarsal with associated lateral deviation of the hallux at the metatarsal phalangeal joint.  First IM angle of 17 degrees.  Possible 2.5 degrees.  Anterior cyma line break seen.  Pez planus deformity seen.  Small plantar and posterior calcaneal enthesopathy.  No periosteal reactions nor osteolytic changes seen.  No occult fractures seen. Comparison: Comparison views available.       ASSESSMENT/PLAN     Diagnoses and all orders for this visit:    1. Foot pain, right (Primary)    2. Hammer toe of right foot    3. Hallux valgus of right foot        Comprehensive lower extremity examination and evaluation was performed.    Discussed findings and treatment plan including risks, benefits, and treatment options with patient in detail. Patient agreed with treatment plan.    Planned procedure: Right first base wedge bunionectomy and right second hammertoe correction.  Upon discussion of surgical correction, post-operative requirements along  with risk and benefits of the surgery, the patient states they do not want to pursue surgery at this time.      An After Visit Summary was printed and given to the patient at discharge, including (if requested) any available informative/educational handouts regarding diagnosis, treatment, or medications. All questions were answered to patient/family satisfaction. Should symptoms fail to improve or worsen they agree to call or return to clinic or to go to the Emergency Department. Discussed the importance of following up with any needed screening tests/labs/specialist appointments and any requested follow-up recommended by me today. Importance of maintaining follow-up discussed and patient accepts that missed appointments can delay diagnosis and potentially lead to worsening of conditions.    Return in about 6 months (around 3/17/2022), or if symptoms worsen or fail to improve, for Podiatry Diabetic Foot Exam., or sooner if acute issues arise.    This document has been electronically signed by Martin Crawley DPM on September 17, 2021 08:54 EDT

## 2021-10-18 DIAGNOSIS — E11.9 TYPE 2 DIABETES MELLITUS WITHOUT COMPLICATION, WITHOUT LONG-TERM CURRENT USE OF INSULIN (HCC): ICD-10-CM

## 2022-01-08 ENCOUNTER — TELEPHONE (OUTPATIENT)
Dept: FAMILY MEDICINE CLINIC | Facility: CLINIC | Age: 57
End: 2022-01-08

## 2022-01-08 DIAGNOSIS — E11.9 TYPE 2 DIABETES MELLITUS WITHOUT COMPLICATION, WITHOUT LONG-TERM CURRENT USE OF INSULIN: ICD-10-CM

## 2022-01-08 NOTE — TELEPHONE ENCOUNTER
Patient is needing metformin sent in for 1 month, that is when he will be due for all refills he says. He takes metformin 500mg  2 pills in the morning, 1 pill in the evening. Please send to [facility]. --KIRSTIE

## 2022-01-15 DIAGNOSIS — E78.5 HYPERLIPIDEMIA, UNSPECIFIED HYPERLIPIDEMIA TYPE: ICD-10-CM

## 2022-01-15 DIAGNOSIS — I10 ESSENTIAL HYPERTENSION: ICD-10-CM

## 2022-01-17 RX ORDER — LISINOPRIL 10 MG/1
TABLET ORAL
Qty: 30 TABLET | Refills: 0 | Status: SHIPPED | OUTPATIENT
Start: 2022-01-17 | End: 2022-01-20

## 2022-01-17 RX ORDER — LOVASTATIN 40 MG/1
TABLET ORAL
Qty: 30 TABLET | Refills: 0 | Status: SHIPPED | OUTPATIENT
Start: 2022-01-17 | End: 2022-01-20 | Stop reason: SDUPTHER

## 2022-01-17 RX ORDER — HYDROCHLOROTHIAZIDE 12.5 MG/1
TABLET ORAL
Qty: 30 TABLET | Refills: 0 | Status: SHIPPED | OUTPATIENT
Start: 2022-01-17 | End: 2022-01-20

## 2022-01-20 ENCOUNTER — OFFICE VISIT (OUTPATIENT)
Dept: FAMILY MEDICINE CLINIC | Facility: CLINIC | Age: 57
End: 2022-01-20

## 2022-01-20 VITALS
DIASTOLIC BLOOD PRESSURE: 82 MMHG | HEART RATE: 96 BPM | TEMPERATURE: 97.3 F | WEIGHT: 209 LBS | HEIGHT: 68 IN | OXYGEN SATURATION: 94 % | SYSTOLIC BLOOD PRESSURE: 118 MMHG | BODY MASS INDEX: 31.67 KG/M2

## 2022-01-20 DIAGNOSIS — E11.9 TYPE 2 DIABETES MELLITUS WITHOUT COMPLICATION, WITHOUT LONG-TERM CURRENT USE OF INSULIN: Primary | ICD-10-CM

## 2022-01-20 DIAGNOSIS — I10 ESSENTIAL HYPERTENSION: ICD-10-CM

## 2022-01-20 DIAGNOSIS — Z12.11 ENCOUNTER FOR SCREENING FOR MALIGNANT NEOPLASM OF COLON: ICD-10-CM

## 2022-01-20 DIAGNOSIS — E78.5 HYPERLIPIDEMIA, UNSPECIFIED HYPERLIPIDEMIA TYPE: ICD-10-CM

## 2022-01-20 DIAGNOSIS — Z11.59 NEED FOR HEPATITIS C SCREENING TEST: ICD-10-CM

## 2022-01-20 DIAGNOSIS — R74.8 ELEVATED LIVER ENZYMES: ICD-10-CM

## 2022-01-20 PROCEDURE — 99214 OFFICE O/P EST MOD 30 MIN: CPT | Performed by: NURSE PRACTITIONER

## 2022-01-20 RX ORDER — METFORMIN HYDROCHLORIDE 500 MG/1
1000 TABLET, EXTENDED RELEASE ORAL
Qty: 180 TABLET | Refills: 1 | Status: SHIPPED | OUTPATIENT
Start: 2022-01-20 | End: 2022-02-11

## 2022-01-20 RX ORDER — LOVASTATIN 40 MG/1
40 TABLET ORAL
Qty: 90 TABLET | Refills: 1 | Status: SHIPPED | OUTPATIENT
Start: 2022-01-20 | End: 2022-06-10 | Stop reason: HOSPADM

## 2022-01-20 RX ORDER — LISINOPRIL AND HYDROCHLOROTHIAZIDE 12.5; 1 MG/1; MG/1
1 TABLET ORAL DAILY
Qty: 90 TABLET | Refills: 1 | Status: SHIPPED | OUTPATIENT
Start: 2022-01-20 | End: 2022-06-17

## 2022-01-20 NOTE — PROGRESS NOTES
Chief Complaint  Hyperlipidemia (REFILLS) and Hypertension    Subjective        Past Medical History:   Diagnosis Date   • Atypical rash    • Diabetes mellitus, type 2 (HCC)    • Disorder of male genital organs    • Dry skin dermatitis    • Elevated glucose    • Essential hypertension 07/30/2020   • Glucosuria    • Hyperlipidemia      Social History     Tobacco Use   • Smoking status: Never Smoker   • Smokeless tobacco: Current User     Types: Snuff   • Tobacco comment: 3/4 can per day; does not smoke   Vaping Use   • Vaping Use: Never used   Substance Use Topics   • Alcohol use: Yes     Comment: current some day    • Drug use: Never      Current Outpatient Medications on File Prior to Visit   Medication Sig   • [DISCONTINUED] hydroCHLOROthiazide (HYDRODIURIL) 12.5 MG tablet TAKE ONE TABLET BY MOUTH EVERY DAY   • [DISCONTINUED] lisinopril (PRINIVIL,ZESTRIL) 10 MG tablet TAKE ONE TABLET BY MOUTH EVERY DAY   • [DISCONTINUED] lovastatin (MEVACOR) 40 MG tablet TAKE ONE TABLET BY MOUTH DAILY WITH DINNER   • [DISCONTINUED] metFORMIN (GLUCOPHAGE) 500 MG tablet TAKE TWO TABLETS BY MOUTH IN THE MORNING AND ONE TABLET BY MOUTH IN THE EVENING   • [DISCONTINUED] nicotine (Nicoderm CQ) 14 MG/24HR patch Place 1 patch on the skin as directed by provider Daily.   • [DISCONTINUED] nicotine (Nicoderm CQ) 21 MG/24HR patch Place 1 patch on the skin as directed by provider Daily.   • [DISCONTINUED] nicotine (Nicoderm CQ) 7 MG/24HR patch Place 1 patch on the skin as directed by provider Daily.     No current facility-administered medications on file prior to visit.      No Known Allergies   Health Maintenance Due   Topic Date Due   • COLORECTAL CANCER SCREENING  Never done   • ANNUAL PHYSICAL  Never done   • COVID-19 Vaccine (1) Never done   • Pneumococcal Vaccine 0-64 (1 of 2 - PPSV23) Never done   • Hepatitis B (1 of 3 - Risk 3-dose series) Never done   • TDAP/TD VACCINES (1 - Tdap) Never done   • ZOSTER VACCINE (1 of 2) Never done   •  "HEPATITIS C SCREENING  Never done   • INFLUENZA VACCINE  Never done   • HEMOGLOBIN A1C  01/15/2022      Glenroy Penaloza presents to [facility]  Pt in the office. Visit completed using [employer] Video call due to Provider being at home. No tobacco dip since July 31, 2021. Pt states he has been eating more. Used nicotine patches to help quit.     DM: does not monitor glucose. Pt states that he will have up to 3 episodes of diarrhea daily, but states some days he will have no diarrhea. Diarrhea worsened after increasing metformin dose. Pt states his diet has not been great through the holidays and after quitting dipping.     HTN: stable in office.     Hyperlipidemia: No issues with medication.     Received Covid vaccine at [facility] in [place], [place]    Pt has not had a colonoscopy in the past. Denies changes in bowel habits.       Objective   Vital Signs:   /82   Pulse 96   Temp 97.3 °F (36.3 °C)   Ht 172.7 cm (68\")   Wt 94.8 kg (209 lb)   SpO2 94%   BMI 31.78 kg/m²     Review of Systems   Constitutional: Negative for diaphoresis.   Cardiovascular: Negative for chest pain and palpitations.   Gastrointestinal: Positive for diarrhea. Negative for blood in stool and constipation.   Neurological: Negative for dizziness, light-headedness and headache.      Physical Exam  Vitals reviewed.   Constitutional:       General: He is not in acute distress.     Appearance: Normal appearance. He is well-developed.   Skin:     Coloration: Skin is not pale.   Neurological:      Mental Status: He is alert and oriented to person, place, and time.   Psychiatric:         Mood and Affect: Mood and affect normal.         Behavior: Behavior normal.         Thought Content: Thought content normal.         Judgment: Judgment normal.        Result Review :   The following data was reviewed by: JANETT Cabezas on 01/20/2022:  CMP    CMP 2/20/21 7/15/21   Glucose 231 (A) 154 (A)   BUN 18 14 "   Creatinine 1.06 1.12   eGFR Non African Am  68   Sodium 136 136   Potassium 3.8 3.4 (A)   Chloride 98 (A) 96 (A)   Calcium 9.0 9.2   Albumin 4.3 4.40   Total Bilirubin 0.35 0.4   Alkaline Phosphatase 49 (A) 39   AST (SGOT) 26 24   ALT (SGPT) 36 31   (A) Abnormal value            Lipid Panel    Lipid Panel 2/20/21 7/15/21   Total Cholesterol  184   Total Cholesterol 184    Triglycerides 152 (A) 195 (A)   HDL Cholesterol 44 47   VLDL Cholesterol 30 34   LDL Cholesterol  110 (A) 103 (A)   LDL/HDL Ratio  2.09   (A) Abnormal value       Comments are available for some flowsheets but are not being displayed.                         Assessment and Plan {CC Problem List  Visit Diagnosis   ROS  Review (Popup)  [employer] Maintenance  Quality  BestPractice  Medications  SmartSets  SnapShot Encounters  Media :23}   Diagnoses and all orders for this visit:    1. Type 2 diabetes mellitus without complication, without long-term current use of insulin (HCC) (Primary)  Comments:  Change to Metformin ER 1000mg daily. Notify with no improvement in diarrhea.   Orders:  -     Urinalysis With Culture If Indicated -  -     CBC & Differential  -     Comprehensive Metabolic Panel  -     Hemoglobin A1c  -     Cancel: MicroAlbumin, Urine, Random - Urine, Clean Catch  -     Cancel: TSH Rfx On Abnormal To Free T4  -     metFORMIN ER (GLUCOPHAGE-XR) 500 MG 24 hr tablet; Take 2 tablets by mouth Daily With Breakfast.  Dispense: 180 tablet; Refill: 1    2. Essential hypertension  Comments:  Change to Lisinopril-HCTZ 10-12.5mg.  Orders:  -     lisinopril-hydrochlorothiazide (PRINZIDE,ZESTORETIC) 10-12.5 MG per tablet; Take 1 tablet by mouth Daily.  Dispense: 90 tablet; Refill: 1    3. Hyperlipidemia, unspecified hyperlipidemia type  -     lovastatin (MEVACOR) 40 MG tablet; Take 1 tablet by mouth Daily With Dinner.  Dispense: 90 tablet; Refill: 1  -     Lipid Panel    4. Encounter for screening for malignant neoplasm of colon  -      Ambulatory Referral For Screening Colonoscopy    5. Need for hepatitis C screening test  -     Hepatitis C Antibody        Follow Up   Return in about 6 months (around 7/20/2022).  Patient was given instructions and counseling regarding his condition or for health maintenance advice. Please see specific information pulled into the AVS if appropriate.

## 2022-01-22 ENCOUNTER — CLINICAL SUPPORT (OUTPATIENT)
Dept: FAMILY MEDICINE CLINIC | Facility: CLINIC | Age: 57
End: 2022-01-22

## 2022-01-22 DIAGNOSIS — I10 ESSENTIAL HYPERTENSION: ICD-10-CM

## 2022-01-22 DIAGNOSIS — E78.5 HYPERLIPIDEMIA, UNSPECIFIED HYPERLIPIDEMIA TYPE: ICD-10-CM

## 2022-01-22 PROCEDURE — 85025 COMPLETE CBC W/AUTO DIFF WBC: CPT | Performed by: NURSE PRACTITIONER

## 2022-01-22 PROCEDURE — 80053 COMPREHEN METABOLIC PANEL: CPT | Performed by: NURSE PRACTITIONER

## 2022-01-22 PROCEDURE — 80061 LIPID PANEL: CPT | Performed by: NURSE PRACTITIONER

## 2022-01-22 PROCEDURE — 36415 COLL VENOUS BLD VENIPUNCTURE: CPT | Performed by: NURSE PRACTITIONER

## 2022-01-22 PROCEDURE — 83036 HEMOGLOBIN GLYCOSYLATED A1C: CPT | Performed by: NURSE PRACTITIONER

## 2022-01-22 PROCEDURE — 81001 URINALYSIS AUTO W/SCOPE: CPT | Performed by: NURSE PRACTITIONER

## 2022-01-22 PROCEDURE — 86803 HEPATITIS C AB TEST: CPT | Performed by: NURSE PRACTITIONER

## 2022-01-22 NOTE — PROGRESS NOTES
Venipuncture Blood Specimen Collection  Venipuncture performed in left arm by Jimenez Patino with good hemostasis. Patient tolerated the procedure well without complications.   01/22/22   Jimenez Patino

## 2022-01-23 LAB
ALBUMIN SERPL-MCNC: 4.4 G/DL (ref 3.5–5.2)
ALBUMIN/GLOB SERPL: 1.4 G/DL
ALP SERPL-CCNC: 55 U/L (ref 39–117)
ALT SERPL W P-5'-P-CCNC: 150 U/L (ref 1–41)
ANION GAP SERPL CALCULATED.3IONS-SCNC: 8.1 MMOL/L (ref 5–15)
AST SERPL-CCNC: 118 U/L (ref 1–40)
BACTERIA UR QL AUTO: NORMAL /HPF
BASOPHILS # BLD AUTO: 0.02 10*3/MM3 (ref 0–0.2)
BASOPHILS NFR BLD AUTO: 0.4 % (ref 0–1.5)
BILIRUB SERPL-MCNC: 0.4 MG/DL (ref 0–1.2)
BILIRUB UR QL STRIP: NEGATIVE
BUN SERPL-MCNC: 15 MG/DL (ref 6–20)
BUN/CREAT SERPL: 13.9 (ref 7–25)
CALCIUM SPEC-SCNC: 9.5 MG/DL (ref 8.6–10.5)
CHLORIDE SERPL-SCNC: 100 MMOL/L (ref 98–107)
CHOLEST SERPL-MCNC: 194 MG/DL (ref 0–200)
CLARITY UR: ABNORMAL
CO2 SERPL-SCNC: 28.9 MMOL/L (ref 22–29)
COD CRY URNS QL: NORMAL /HPF
COLOR UR: ABNORMAL
CREAT SERPL-MCNC: 1.08 MG/DL (ref 0.76–1.27)
DEPRECATED RDW RBC AUTO: 40.6 FL (ref 37–54)
EOSINOPHIL # BLD AUTO: 0.11 10*3/MM3 (ref 0–0.4)
EOSINOPHIL NFR BLD AUTO: 2.3 % (ref 0.3–6.2)
ERYTHROCYTE [DISTWIDTH] IN BLOOD BY AUTOMATED COUNT: 12.2 % (ref 12.3–15.4)
GFR SERPL CREATININE-BSD FRML MDRD: 70 ML/MIN/1.73
GLOBULIN UR ELPH-MCNC: 3.2 GM/DL
GLUCOSE SERPL-MCNC: 227 MG/DL (ref 65–99)
GLUCOSE UR STRIP-MCNC: ABNORMAL MG/DL
HBA1C MFR BLD: 9.9 % (ref 4.8–5.6)
HCT VFR BLD AUTO: 45.9 % (ref 37.5–51)
HCV AB SER DONR QL: NORMAL
HDLC SERPL-MCNC: 42 MG/DL (ref 40–60)
HGB BLD-MCNC: 15.6 G/DL (ref 13–17.7)
HGB UR QL STRIP.AUTO: NEGATIVE
HYALINE CASTS UR QL AUTO: NORMAL /LPF
KETONES UR QL STRIP: ABNORMAL
LDLC SERPL CALC-MCNC: 125 MG/DL (ref 0–100)
LDLC/HDLC SERPL: 2.9 {RATIO}
LEUKOCYTE ESTERASE UR QL STRIP.AUTO: NEGATIVE
LYMPHOCYTES # BLD AUTO: 2.15 10*3/MM3 (ref 0.7–3.1)
LYMPHOCYTES NFR BLD AUTO: 45.7 % (ref 19.6–45.3)
MCH RBC QN AUTO: 31.2 PG (ref 26.6–33)
MCHC RBC AUTO-ENTMCNC: 34 G/DL (ref 31.5–35.7)
MCV RBC AUTO: 91.8 FL (ref 79–97)
MONOCYTES # BLD AUTO: 0.63 10*3/MM3 (ref 0.1–0.9)
MONOCYTES NFR BLD AUTO: 13.4 % (ref 5–12)
NEUTROPHILS NFR BLD AUTO: 1.78 10*3/MM3 (ref 1.7–7)
NEUTROPHILS NFR BLD AUTO: 38 % (ref 42.7–76)
NITRITE UR QL STRIP: NEGATIVE
PH UR STRIP.AUTO: 6 [PH] (ref 5–8)
PLATELET # BLD AUTO: 135 10*3/MM3 (ref 140–450)
PMV BLD AUTO: 11.2 FL (ref 6–12)
POTASSIUM SERPL-SCNC: 4.5 MMOL/L (ref 3.5–5.2)
PROT SERPL-MCNC: 7.6 G/DL (ref 6–8.5)
PROT UR QL STRIP: ABNORMAL
RBC # BLD AUTO: 5 10*6/MM3 (ref 4.14–5.8)
RBC # UR STRIP: NORMAL /HPF
REF LAB TEST METHOD: NORMAL
SODIUM SERPL-SCNC: 137 MMOL/L (ref 136–145)
SP GR UR STRIP: >=1.03 (ref 1–1.03)
SQUAMOUS #/AREA URNS HPF: NORMAL /HPF
TRIGL SERPL-MCNC: 151 MG/DL (ref 0–150)
UROBILINOGEN UR QL STRIP: ABNORMAL
VLDLC SERPL-MCNC: 27 MG/DL (ref 5–40)
WBC # UR STRIP: NORMAL /HPF
WBC NRBC COR # BLD: 4.7 10*3/MM3 (ref 3.4–10.8)

## 2022-02-10 ENCOUNTER — TELEPHONE (OUTPATIENT)
Dept: FAMILY MEDICINE CLINIC | Facility: CLINIC | Age: 57
End: 2022-02-10

## 2022-02-11 DIAGNOSIS — E11.9 TYPE 2 DIABETES MELLITUS WITHOUT COMPLICATION, WITHOUT LONG-TERM CURRENT USE OF INSULIN: ICD-10-CM

## 2022-02-11 RX ORDER — METFORMIN HYDROCHLORIDE 500 MG/1
500 TABLET, EXTENDED RELEASE ORAL 3 TIMES DAILY
Qty: 180 TABLET | Refills: 1 | Status: SHIPPED | OUTPATIENT
Start: 2022-02-11 | End: 2022-02-11 | Stop reason: SDUPTHER

## 2022-02-11 RX ORDER — METFORMIN HYDROCHLORIDE 500 MG/1
TABLET, EXTENDED RELEASE ORAL
Qty: 270 TABLET | Refills: 1 | Status: ON HOLD | OUTPATIENT
Start: 2022-02-11 | End: 2022-06-09

## 2022-02-16 ENCOUNTER — TELEPHONE (OUTPATIENT)
Dept: GASTROENTEROLOGY | Facility: CLINIC | Age: 57
End: 2022-02-16

## 2022-02-16 ENCOUNTER — PREP FOR SURGERY (OUTPATIENT)
Dept: OTHER | Facility: HOSPITAL | Age: 57
End: 2022-02-16

## 2022-02-16 ENCOUNTER — CLINICAL SUPPORT (OUTPATIENT)
Dept: GASTROENTEROLOGY | Facility: CLINIC | Age: 57
End: 2022-02-16

## 2022-02-16 DIAGNOSIS — Z12.11 ENCOUNTER FOR SCREENING FOR MALIGNANT NEOPLASM OF COLON: Primary | ICD-10-CM

## 2022-02-16 RX ORDER — LISINOPRIL 10 MG/1
TABLET ORAL
COMMUNITY
Start: 2022-02-09 | End: 2022-03-31

## 2022-02-16 RX ORDER — HYDROCHLOROTHIAZIDE 12.5 MG/1
12.5 TABLET ORAL DAILY
COMMUNITY
Start: 2022-02-09 | End: 2022-03-31

## 2022-02-16 NOTE — TELEPHONE ENCOUNTER
Glenroy Penaloza  REASON FOR CALL Colon Screening  SENT IN [facility]  Past Medical History:   Diagnosis Date   • Atypical rash    • Diabetes mellitus, type 2 (HCC)    • Disorder of male genital organs    • Dry skin dermatitis    • Elevated glucose    • Essential hypertension 07/30/2020   • Glucosuria    • Hyperlipidemia      No Known Allergies  No past surgical history on file.  Social History     Socioeconomic History   • Marital status:    Tobacco Use   • Smoking status: Never Smoker   • Smokeless tobacco: Current User     Types: Snuff   • Tobacco comment: 3/4 can per day; does not smoke   Vaping Use   • Vaping Use: Never used   Substance and Sexual Activity   • Alcohol use: Yes     Comment: current some day    • Drug use: Never     Family History   Problem Relation Age of Onset   • Cancer Other    • Colon cancer Neg Hx        Current Outpatient Medications:   •  hydroCHLOROthiazide (HYDRODIURIL) 12.5 MG tablet, , Disp: , Rfl:   •  lisinopril (PRINIVIL,ZESTRIL) 10 MG tablet, , Disp: , Rfl:   •  lisinopril-hydrochlorothiazide (PRINZIDE,ZESTORETIC) 10-12.5 MG per tablet, Take 1 tablet by mouth Daily., Disp: 90 tablet, Rfl: 1  •  lovastatin (MEVACOR) 40 MG tablet, Take 1 tablet by mouth Daily With Dinner., Disp: 90 tablet, Rfl: 1  •  metFORMIN ER (GLUCOPHAGE-XR) 500 MG 24 hr tablet, Take 2 in the morning and 1 in the evening., Disp: 270 tablet, Rfl: 1

## 2022-02-17 PROBLEM — Z12.11 ENCOUNTER FOR SCREENING FOR MALIGNANT NEOPLASM OF COLON: Status: ACTIVE | Noted: 2022-02-17

## 2022-04-04 ENCOUNTER — ANESTHESIA (OUTPATIENT)
Dept: GASTROENTEROLOGY | Facility: HOSPITAL | Age: 57
End: 2022-04-04

## 2022-04-04 ENCOUNTER — ANESTHESIA EVENT (OUTPATIENT)
Dept: GASTROENTEROLOGY | Facility: HOSPITAL | Age: 57
End: 2022-04-04

## 2022-04-04 ENCOUNTER — HOSPITAL ENCOUNTER (OUTPATIENT)
Facility: HOSPITAL | Age: 57
Setting detail: HOSPITAL OUTPATIENT SURGERY
Discharge: HOME | End: 2022-04-04
Attending: INTERNAL MEDICINE | Admitting: INTERNAL MEDICINE

## 2022-04-04 VITALS
DIASTOLIC BLOOD PRESSURE: 86 MMHG | HEIGHT: 68 IN | TEMPERATURE: 97.5 F | RESPIRATION RATE: 17 BRPM | BODY MASS INDEX: 31.74 KG/M2 | SYSTOLIC BLOOD PRESSURE: 124 MMHG | WEIGHT: 209.44 LBS | OXYGEN SATURATION: 95 % | HEART RATE: 63 BPM

## 2022-04-04 DIAGNOSIS — Z12.11 ENCOUNTER FOR SCREENING FOR MALIGNANT NEOPLASM OF COLON: ICD-10-CM

## 2022-04-04 LAB — GLUCOSE BLDC GLUCOMTR-MCNC: 222 MG/DL (ref 70–99)

## 2022-04-04 PROCEDURE — 88305 TISSUE EXAM BY PATHOLOGIST: CPT | Performed by: INTERNAL MEDICINE

## 2022-04-04 PROCEDURE — 45385 COLONOSCOPY W/LESION REMOVAL: CPT | Performed by: INTERNAL MEDICINE

## 2022-04-04 PROCEDURE — 82962 GLUCOSE BLOOD TEST: CPT

## 2022-04-04 PROCEDURE — 25010000002 PROPOFOL 10 MG/ML EMULSION: Performed by: NURSE ANESTHETIST, CERTIFIED REGISTERED

## 2022-04-04 RX ORDER — SODIUM CHLORIDE, SODIUM LACTATE, POTASSIUM CHLORIDE, CALCIUM CHLORIDE 600; 310; 30; 20 MG/100ML; MG/100ML; MG/100ML; MG/100ML
30 INJECTION, SOLUTION INTRAVENOUS CONTINUOUS
Status: DISCONTINUED | OUTPATIENT
Start: 2022-04-04 | End: 2022-04-04 | Stop reason: HOSPADM

## 2022-04-04 RX ORDER — PROPOFOL 10 MG/ML
VIAL (ML) INTRAVENOUS AS NEEDED
Status: DISCONTINUED | OUTPATIENT
Start: 2022-04-04 | End: 2022-04-04 | Stop reason: SURG

## 2022-04-04 RX ORDER — LIDOCAINE HYDROCHLORIDE 20 MG/ML
INJECTION, SOLUTION INFILTRATION; PERINEURAL AS NEEDED
Status: DISCONTINUED | OUTPATIENT
Start: 2022-04-04 | End: 2022-04-04 | Stop reason: SURG

## 2022-04-04 RX ADMIN — PROPOFOL 100 MG: 10 INJECTION, EMULSION INTRAVENOUS at 11:04

## 2022-04-04 RX ADMIN — PROPOFOL 175 MCG/KG/MIN: 10 INJECTION, EMULSION INTRAVENOUS at 11:04

## 2022-04-04 RX ADMIN — LIDOCAINE HYDROCHLORIDE 50 MG: 20 INJECTION, SOLUTION INFILTRATION; PERINEURAL at 11:04

## 2022-04-04 RX ADMIN — SODIUM CHLORIDE, POTASSIUM CHLORIDE, SODIUM LACTATE AND CALCIUM CHLORIDE 30 ML/HR: 600; 310; 30; 20 INJECTION, SOLUTION INTRAVENOUS at 09:49

## 2022-04-04 NOTE — H&P
"Pre Procedure History & Physical    Chief Complaint:   Screening    Subjective     HPI:   Average risk screening    Past Medical History:   Past Medical History:   Diagnosis Date   • Atypical rash    • Diabetes mellitus, type 2 (HCC)    • Disorder of male genital organs    • Elevated glucose    • Essential hypertension 07/30/2020   • Glucosuria    • Hyperlipidemia        Past Surgical History:  History reviewed. No pertinent surgical history.    Family History:  Family History   Problem Relation Age of Onset   • Cancer Mother    • Cancer Father    • Cancer Maternal Uncle    • Cancer Other    • Colon cancer Neg Hx    • Malig Hyperthermia Neg Hx        Social History:   reports that he has never smoked. His smokeless tobacco use includes snuff. He reports current alcohol use. He reports that he does not use drugs.    Medications:   Medications Prior to Admission   Medication Sig Dispense Refill Last Dose   • lisinopril-hydrochlorothiazide (PRINZIDE,ZESTORETIC) 10-12.5 MG per tablet Take 1 tablet by mouth Daily. 90 tablet 1    • lovastatin (MEVACOR) 40 MG tablet Take 1 tablet by mouth Daily With Dinner. 90 tablet 1    • metFORMIN ER (GLUCOPHAGE-XR) 500 MG 24 hr tablet Take 2 in the morning and 1 in the evening. (Patient taking differently: Take 1,000 mg by mouth. Take 2 in the morning and 1 in the evening.) 270 tablet 1    • polyethylene glycol (GoLYTELY) 236 g solution Starting at noon on day prior to procedure, drink 8 ounces every 30 minutes until all gone or stools are clear. May add flavor packet. (Patient taking differently: Take 4 L by mouth Every 12 (Twelve) Hours.) 4000 mL 0        Allergies:  Patient has no known allergies.        Objective     Blood pressure 135/92, pulse 68, temperature 97.2 °F (36.2 °C), temperature source Temporal, resp. rate 16, height 172.7 cm (67.99\"), weight 95 kg (209 lb 7 oz), SpO2 95 %.    Physical Exam   Constitutional: Pt is oriented to person, place, and time and well-developed, " well-nourished, and in no distress.   Mouth/Throat: Oropharynx is clear and moist.   Neck: Normal range of motion.   Cardiovascular: Normal rate, regular rhythm and normal heart sounds.    Pulmonary/Chest: Effort normal and breath sounds normal.   Abdominal: Soft. Nontender  Skin: Skin is warm and dry.   Psychiatric: Mood, memory, affect and judgment normal.     Assessment/Plan     Diagnosis:  Screening    Anticipated Surgical Procedure:  Colonoscopy    The risks, benefits, and alternatives of this procedure have been discussed with the patient or the responsible party- the patient understands and agrees to proceed.

## 2022-04-04 NOTE — ANESTHESIA POSTPROCEDURE EVALUATION
Patient: Glenroy Penaloza    Procedure Summary     Date: 04/04/22 Room / Location: [facility] ENDOSCOPY 4 / [facility] ENDOSCOPY    Anesthesia Start: 1102 Anesthesia Stop: 1127    Procedure: COLONOSCOPY WITH POLYPECTOMY (N/A ) Diagnosis:       Encounter for screening for malignant neoplasm of colon      (Encounter for screening for malignant neoplasm of colon [Z12.11])    Surgeons: Arsen Payan MD Provider: Luis Schmidt MD    Anesthesia Type: general ASA Status: 3          Anesthesia Type: general    Vitals  Vitals Value Taken Time   /83 04/04/22 1138   Temp 36.3 °C (97.4 °F) 04/04/22 1128   Pulse 68 04/04/22 1141   Resp 16 04/04/22 1137   SpO2 97 % 04/04/22 1141   Vitals shown include unvalidated device data.        Post Anesthesia Care and Evaluation    Patient location during evaluation: bedside  Patient participation: complete - patient participated  Level of consciousness: awake  Pain management: adequate  Airway patency: patent  Anesthetic complications: No anesthetic complications  PONV Status: none  Cardiovascular status: acceptable  Respiratory status: acceptable  Hydration status: acceptable    Comments: An Anesthesiologist personally participated in the most demanding procedures (including induction and emergence if applicable) in the anesthesia plan, monitored the course of anesthesia administration at frequent intervals and remained physically present and available for immediate diagnosis and treatment of emergencies.

## 2022-04-04 NOTE — ANESTHESIA PREPROCEDURE EVALUATION
Anesthesia Evaluation     Patient summary reviewed and Nursing notes reviewed                Airway   Mallampati: I  TM distance: >3 FB  Neck ROM: full  No difficulty expected  Dental      Pulmonary - negative pulmonary ROS and normal exam    breath sounds clear to auscultation  Cardiovascular - normal exam    Rhythm: regular  Rate: normal    (+) hypertension, hyperlipidemia,       Neuro/Psych- negative ROS  GI/Hepatic/Renal/Endo    (+) obesity,   diabetes mellitus,     Musculoskeletal (-) negative ROS    Abdominal    Substance History - negative use     OB/GYN negative ob/gyn ROS         Other                        Anesthesia Plan    ASA 3     general     intravenous induction     Anesthetic plan, all risks, benefits, and alternatives have been provided, discussed and informed consent has been obtained with: patient.        CODE STATUS:

## 2022-04-05 LAB
CYTO UR: NORMAL
LAB AP CASE REPORT: NORMAL
LAB AP CLINICAL INFORMATION: NORMAL
PATH REPORT.FINAL DX SPEC: NORMAL
PATH REPORT.GROSS SPEC: NORMAL

## 2022-04-08 ENCOUNTER — TELEPHONE (OUTPATIENT)
Dept: GASTROENTEROLOGY | Facility: CLINIC | Age: 57
End: 2022-04-08

## 2022-06-09 ENCOUNTER — APPOINTMENT (OUTPATIENT)
Dept: CT IMAGING | Facility: HOSPITAL | Age: 57
End: 2022-06-09

## 2022-06-09 ENCOUNTER — APPOINTMENT (OUTPATIENT)
Dept: GENERAL RADIOLOGY | Facility: HOSPITAL | Age: 57
End: 2022-06-09

## 2022-06-09 ENCOUNTER — APPOINTMENT (OUTPATIENT)
Dept: MRI IMAGING | Facility: HOSPITAL | Age: 57
End: 2022-06-09

## 2022-06-09 ENCOUNTER — HOSPITAL ENCOUNTER (INPATIENT)
Facility: HOSPITAL | Age: 57
LOS: 1 days | Discharge: HOME | End: 2022-06-10
Attending: EMERGENCY MEDICINE | Admitting: FAMILY MEDICINE

## 2022-06-09 ENCOUNTER — APPOINTMENT (OUTPATIENT)
Dept: CARDIOLOGY | Facility: HOSPITAL | Age: 57
End: 2022-06-09

## 2022-06-09 DIAGNOSIS — I63.9 ACUTE CVA (CEREBROVASCULAR ACCIDENT): Primary | ICD-10-CM

## 2022-06-09 DIAGNOSIS — E11.69 TYPE 2 DIABETES MELLITUS WITH OTHER SPECIFIED COMPLICATION, WITHOUT LONG-TERM CURRENT USE OF INSULIN: ICD-10-CM

## 2022-06-09 PROBLEM — R20.2 NUMBNESS AND TINGLING OF RIGHT UPPER AND LOWER EXTREMITY: Status: ACTIVE | Noted: 2022-06-09

## 2022-06-09 PROBLEM — R20.0 NUMBNESS AND TINGLING OF RIGHT UPPER AND LOWER EXTREMITY: Status: ACTIVE | Noted: 2022-06-09

## 2022-06-09 LAB
ABO GROUP BLD: NORMAL
ALBUMIN SERPL-MCNC: 4.5 G/DL (ref 3.5–5.2)
ALBUMIN/GLOB SERPL: 1.7 G/DL
ALP SERPL-CCNC: 57 U/L (ref 39–117)
ALT SERPL W P-5'-P-CCNC: 73 U/L (ref 1–41)
ANION GAP SERPL CALCULATED.3IONS-SCNC: 14 MMOL/L (ref 5–15)
APTT PPP: 24.9 SECONDS (ref 24–31)
AST SERPL-CCNC: 57 U/L (ref 1–40)
BASOPHILS # BLD AUTO: 0 10*3/MM3 (ref 0–0.2)
BASOPHILS NFR BLD AUTO: 0.6 % (ref 0–1.5)
BH CV ECHO MEAS - ACS: 2.22 CM
BH CV ECHO MEAS - AO MAX PG: 3.7 MMHG
BH CV ECHO MEAS - AO MEAN PG: 2.14 MMHG
BH CV ECHO MEAS - AO ROOT DIAM: 3.4 CM
BH CV ECHO MEAS - AO V2 MAX: 95.8 CM/SEC
BH CV ECHO MEAS - AO V2 VTI: 18.6 CM
BH CV ECHO MEAS - AVA(I,D): 2.8 CM2
BH CV ECHO MEAS - EDV(CUBED): 89.8 ML
BH CV ECHO MEAS - EDV(MOD-SP4): 87.3 ML
BH CV ECHO MEAS - EF(MOD-BP): 56 %
BH CV ECHO MEAS - EF(MOD-SP4): 56 %
BH CV ECHO MEAS - ESV(CUBED): 27.7 ML
BH CV ECHO MEAS - ESV(MOD-SP4): 38.4 ML
BH CV ECHO MEAS - FS: 32.4 %
BH CV ECHO MEAS - IVS/LVPW: 1.04 CM
BH CV ECHO MEAS - IVSD: 1.18 CM
BH CV ECHO MEAS - LA DIMENSION: 3.6 CM
BH CV ECHO MEAS - LV MASS(C)D: 187.4 GRAMS
BH CV ECHO MEAS - LV MAX PG: 1.63 MMHG
BH CV ECHO MEAS - LV MEAN PG: 0.81 MMHG
BH CV ECHO MEAS - LV V1 MAX: 63.8 CM/SEC
BH CV ECHO MEAS - LV V1 VTI: 12.5 CM
BH CV ECHO MEAS - LVIDD: 4.5 CM
BH CV ECHO MEAS - LVIDS: 3 CM
BH CV ECHO MEAS - LVOT AREA: 4.2 CM2
BH CV ECHO MEAS - LVOT DIAM: 2.31 CM
BH CV ECHO MEAS - LVPWD: 1.14 CM
BH CV ECHO MEAS - MR MAX PG: 52.1 MMHG
BH CV ECHO MEAS - MR MAX VEL: 360.9 CM/SEC
BH CV ECHO MEAS - MV A MAX VEL: 55.4 CM/SEC
BH CV ECHO MEAS - MV DEC SLOPE: 261.3 CM/SEC2
BH CV ECHO MEAS - MV DEC TIME: 0.21 MSEC
BH CV ECHO MEAS - MV E MAX VEL: 52.7 CM/SEC
BH CV ECHO MEAS - MV E/A: 0.95
BH CV ECHO MEAS - MV MAX PG: 1.61 MMHG
BH CV ECHO MEAS - MV MEAN PG: 0.7 MMHG
BH CV ECHO MEAS - MV V2 VTI: 20.9 CM
BH CV ECHO MEAS - MVA(VTI): 2.5 CM2
BH CV ECHO MEAS - PA V2 MAX: 74 CM/SEC
BH CV ECHO MEAS - PI END-D VEL: 82.5 CM/SEC
BH CV ECHO MEAS - PULM A REVS DUR: 0.11 SEC
BH CV ECHO MEAS - PULM A REVS VEL: 23.6 CM/SEC
BH CV ECHO MEAS - PULM DIAS VEL: 32.3 CM/SEC
BH CV ECHO MEAS - PULM S/D: 1.55
BH CV ECHO MEAS - PULM SYS VEL: 50 CM/SEC
BH CV ECHO MEAS - RAP SYSTOLE: 3 MMHG
BH CV ECHO MEAS - RV MAX PG: 0.53 MMHG
BH CV ECHO MEAS - RV V1 MAX: 36.5 CM/SEC
BH CV ECHO MEAS - RV V1 VTI: 7.7 CM
BH CV ECHO MEAS - RVDD: 2.7 CM
BH CV ECHO MEAS - RVSP: 21 MMHG
BH CV ECHO MEAS - SV(LVOT): 52.4 ML
BH CV ECHO MEAS - SV(MOD-SP4): 48.9 ML
BH CV ECHO MEAS - TR MAX PG: 18 MMHG
BH CV ECHO MEAS - TR MAX VEL: 209 CM/SEC
BILIRUB SERPL-MCNC: 0.4 MG/DL (ref 0–1.2)
BLD GP AB SCN SERPL QL: NEGATIVE
BUN SERPL-MCNC: 15 MG/DL (ref 6–20)
BUN/CREAT SERPL: 14.3 (ref 7–25)
CALCIUM SPEC-SCNC: 9.9 MG/DL (ref 8.6–10.5)
CHLORIDE SERPL-SCNC: 100 MMOL/L (ref 98–107)
CO2 SERPL-SCNC: 24 MMOL/L (ref 22–29)
CREAT SERPL-MCNC: 1.05 MG/DL (ref 0.76–1.27)
DEPRECATED RDW RBC AUTO: 39.8 FL (ref 37–54)
EGFRCR SERPLBLD CKD-EPI 2021: 82.3 ML/MIN/1.73
EOSINOPHIL # BLD AUTO: 0.2 10*3/MM3 (ref 0–0.4)
EOSINOPHIL NFR BLD AUTO: 2.9 % (ref 0.3–6.2)
ERYTHROCYTE [DISTWIDTH] IN BLOOD BY AUTOMATED COUNT: 12.7 % (ref 12.3–15.4)
FOLATE SERPL-MCNC: >20 NG/ML (ref 4.78–24.2)
GLOBULIN UR ELPH-MCNC: 2.7 GM/DL
GLUCOSE BLDC GLUCOMTR-MCNC: 189 MG/DL (ref 70–105)
GLUCOSE BLDC GLUCOMTR-MCNC: 208 MG/DL (ref 70–105)
GLUCOSE BLDC GLUCOMTR-MCNC: 208 MG/DL (ref 70–105)
GLUCOSE BLDC GLUCOMTR-MCNC: 215 MG/DL (ref 70–105)
GLUCOSE SERPL-MCNC: 230 MG/DL (ref 65–99)
HCT VFR BLD AUTO: 44.5 % (ref 37.5–51)
HGB BLD-MCNC: 15.5 G/DL (ref 13–17.7)
HOLD SPECIMEN: NORMAL
HOLD SPECIMEN: NORMAL
INR PPP: 0.95 (ref 0.93–1.1)
LYMPHOCYTES # BLD AUTO: 2.5 10*3/MM3 (ref 0.7–3.1)
LYMPHOCYTES NFR BLD AUTO: 41 % (ref 19.6–45.3)
MAXIMAL PREDICTED HEART RATE: 162 BPM
MCH RBC QN AUTO: 31.2 PG (ref 26.6–33)
MCHC RBC AUTO-ENTMCNC: 34.9 G/DL (ref 31.5–35.7)
MCV RBC AUTO: 89.4 FL (ref 79–97)
MONOCYTES # BLD AUTO: 0.6 10*3/MM3 (ref 0.1–0.9)
MONOCYTES NFR BLD AUTO: 9.1 % (ref 5–12)
NEUTROPHILS NFR BLD AUTO: 2.8 10*3/MM3 (ref 1.7–7)
NEUTROPHILS NFR BLD AUTO: 46.4 % (ref 42.7–76)
NRBC BLD AUTO-RTO: 0.1 /100 WBC (ref 0–0.2)
PLATELET # BLD AUTO: 177 10*3/MM3 (ref 140–450)
PMV BLD AUTO: 8.6 FL (ref 6–12)
POTASSIUM SERPL-SCNC: 3.9 MMOL/L (ref 3.5–5.2)
PROT SERPL-MCNC: 7.2 G/DL (ref 6–8.5)
PROTHROMBIN TIME: 9.8 SECONDS (ref 9.6–11.7)
RBC # BLD AUTO: 4.97 10*6/MM3 (ref 4.14–5.8)
RH BLD: POSITIVE
SARS-COV-2 RNA PNL SPEC NAA+PROBE: NOT DETECTED
SODIUM SERPL-SCNC: 138 MMOL/L (ref 136–145)
STRESS TARGET HR: 138 BPM
T&S EXPIRATION DATE: NORMAL
T4 FREE SERPL-MCNC: 1.09 NG/DL (ref 0.93–1.7)
TROPONIN T SERPL-MCNC: <0.01 NG/ML (ref 0–0.03)
TSH SERPL DL<=0.05 MIU/L-ACNC: 2.03 UIU/ML (ref 0.27–4.2)
VIT B12 BLD-MCNC: 532 PG/ML (ref 211–946)
WBC NRBC COR # BLD: 6.1 10*3/MM3 (ref 3.4–10.8)
WHOLE BLOOD HOLD COAG: NORMAL
WHOLE BLOOD HOLD SPECIMEN: NORMAL

## 2022-06-09 PROCEDURE — 99222 1ST HOSP IP/OBS MODERATE 55: CPT | Performed by: PSYCHIATRY & NEUROLOGY

## 2022-06-09 PROCEDURE — 80050 GENERAL HEALTH PANEL: CPT | Performed by: EMERGENCY MEDICINE

## 2022-06-09 PROCEDURE — 4A03X5D MEASUREMENT OF ARTERIAL FLOW, INTRACRANIAL, EXTERNAL APPROACH: ICD-10-PCS | Performed by: RADIOLOGY

## 2022-06-09 PROCEDURE — 3E03317 INTRODUCTION OF OTHER THROMBOLYTIC INTO PERIPHERAL VEIN, PERCUTANEOUS APPROACH: ICD-10-PCS | Performed by: PSYCHIATRY & NEUROLOGY

## 2022-06-09 PROCEDURE — 99285 EMERGENCY DEPT VISIT HI MDM: CPT

## 2022-06-09 PROCEDURE — 86901 BLOOD TYPING SEROLOGIC RH(D): CPT

## 2022-06-09 PROCEDURE — 70551 MRI BRAIN STEM W/O DYE: CPT

## 2022-06-09 PROCEDURE — 36415 COLL VENOUS BLD VENIPUNCTURE: CPT | Performed by: EMERGENCY MEDICINE

## 2022-06-09 PROCEDURE — 93306 TTE W/DOPPLER COMPLETE: CPT

## 2022-06-09 PROCEDURE — 82962 GLUCOSE BLOOD TEST: CPT

## 2022-06-09 PROCEDURE — 71045 X-RAY EXAM CHEST 1 VIEW: CPT

## 2022-06-09 PROCEDURE — 85730 THROMBOPLASTIN TIME PARTIAL: CPT | Performed by: EMERGENCY MEDICINE

## 2022-06-09 PROCEDURE — 86900 BLOOD TYPING SEROLOGIC ABO: CPT

## 2022-06-09 PROCEDURE — 82746 ASSAY OF FOLIC ACID SERUM: CPT | Performed by: PSYCHIATRY & NEUROLOGY

## 2022-06-09 PROCEDURE — 0 IOPAMIDOL PER 1 ML: Performed by: EMERGENCY MEDICINE

## 2022-06-09 PROCEDURE — 84484 ASSAY OF TROPONIN QUANT: CPT | Performed by: EMERGENCY MEDICINE

## 2022-06-09 PROCEDURE — 82607 VITAMIN B-12: CPT | Performed by: PSYCHIATRY & NEUROLOGY

## 2022-06-09 PROCEDURE — 93005 ELECTROCARDIOGRAM TRACING: CPT | Performed by: EMERGENCY MEDICINE

## 2022-06-09 PROCEDURE — 93306 TTE W/DOPPLER COMPLETE: CPT | Performed by: INTERNAL MEDICINE

## 2022-06-09 PROCEDURE — 86900 BLOOD TYPING SEROLOGIC ABO: CPT | Performed by: EMERGENCY MEDICINE

## 2022-06-09 PROCEDURE — 86901 BLOOD TYPING SEROLOGIC RH(D): CPT | Performed by: EMERGENCY MEDICINE

## 2022-06-09 PROCEDURE — 25010000002 ALTEPLASE PER 1 MG: Performed by: EMERGENCY MEDICINE

## 2022-06-09 PROCEDURE — 70496 CT ANGIOGRAPHY HEAD: CPT

## 2022-06-09 PROCEDURE — 84439 ASSAY OF FREE THYROXINE: CPT | Performed by: PSYCHIATRY & NEUROLOGY

## 2022-06-09 PROCEDURE — 86850 RBC ANTIBODY SCREEN: CPT | Performed by: EMERGENCY MEDICINE

## 2022-06-09 PROCEDURE — 85610 PROTHROMBIN TIME: CPT | Performed by: EMERGENCY MEDICINE

## 2022-06-09 PROCEDURE — 63710000001 INSULIN LISPRO (HUMAN) PER 5 UNITS: Performed by: NURSE PRACTITIONER

## 2022-06-09 PROCEDURE — 70450 CT HEAD/BRAIN W/O DYE: CPT

## 2022-06-09 PROCEDURE — 87635 SARS-COV-2 COVID-19 AMP PRB: CPT | Performed by: EMERGENCY MEDICINE

## 2022-06-09 PROCEDURE — 70498 CT ANGIOGRAPHY NECK: CPT

## 2022-06-09 RX ORDER — NITROGLYCERIN 0.4 MG/1
0.4 TABLET SUBLINGUAL
Status: DISCONTINUED | OUTPATIENT
Start: 2022-06-09 | End: 2022-06-10 | Stop reason: HOSPADM

## 2022-06-09 RX ORDER — PANTOPRAZOLE SODIUM 40 MG/10ML
40 INJECTION, POWDER, LYOPHILIZED, FOR SOLUTION INTRAVENOUS
Status: DISCONTINUED | OUTPATIENT
Start: 2022-06-09 | End: 2022-06-10

## 2022-06-09 RX ORDER — ASPIRIN 300 MG/1
300 SUPPOSITORY RECTAL DAILY
Status: DISCONTINUED | OUTPATIENT
Start: 2022-06-10 | End: 2022-06-10 | Stop reason: HOSPADM

## 2022-06-09 RX ORDER — METFORMIN HYDROCHLORIDE 500 MG/1
500 TABLET, EXTENDED RELEASE ORAL
COMMUNITY
End: 2022-07-12

## 2022-06-09 RX ORDER — ASPIRIN 325 MG
325 TABLET ORAL DAILY
Status: DISCONTINUED | OUTPATIENT
Start: 2022-06-10 | End: 2022-06-10 | Stop reason: HOSPADM

## 2022-06-09 RX ORDER — SODIUM CHLORIDE 0.9 % (FLUSH) 0.9 %
10 SYRINGE (ML) INJECTION EVERY 12 HOURS SCHEDULED
Status: DISCONTINUED | OUTPATIENT
Start: 2022-06-09 | End: 2022-06-10 | Stop reason: HOSPADM

## 2022-06-09 RX ORDER — SODIUM CHLORIDE 9 MG/ML
75 INJECTION, SOLUTION INTRAVENOUS CONTINUOUS
Status: DISPENSED | OUTPATIENT
Start: 2022-06-09 | End: 2022-06-09

## 2022-06-09 RX ORDER — ATORVASTATIN CALCIUM 40 MG/1
80 TABLET, FILM COATED ORAL NIGHTLY
Status: DISCONTINUED | OUTPATIENT
Start: 2022-06-09 | End: 2022-06-09

## 2022-06-09 RX ORDER — ACETAMINOPHEN 650 MG/1
650 SUPPOSITORY RECTAL EVERY 4 HOURS PRN
Status: DISCONTINUED | OUTPATIENT
Start: 2022-06-09 | End: 2022-06-10 | Stop reason: HOSPADM

## 2022-06-09 RX ORDER — LABETALOL HYDROCHLORIDE 5 MG/ML
10 INJECTION, SOLUTION INTRAVENOUS
Status: DISCONTINUED | OUTPATIENT
Start: 2022-06-09 | End: 2022-06-10 | Stop reason: HOSPADM

## 2022-06-09 RX ORDER — HYDROCHLOROTHIAZIDE 12.5 MG/1
12.5 TABLET ORAL DAILY
Status: ON HOLD | COMMUNITY
End: 2022-06-09

## 2022-06-09 RX ORDER — INSULIN LISPRO 100 [IU]/ML
0-7 INJECTION, SOLUTION INTRAVENOUS; SUBCUTANEOUS AS NEEDED
Status: DISCONTINUED | OUTPATIENT
Start: 2022-06-09 | End: 2022-06-10

## 2022-06-09 RX ORDER — ONDANSETRON 2 MG/ML
4 INJECTION INTRAMUSCULAR; INTRAVENOUS EVERY 6 HOURS PRN
Status: DISCONTINUED | OUTPATIENT
Start: 2022-06-09 | End: 2022-06-10 | Stop reason: HOSPADM

## 2022-06-09 RX ORDER — ONDANSETRON 4 MG/1
4 TABLET, FILM COATED ORAL EVERY 6 HOURS PRN
Status: DISCONTINUED | OUTPATIENT
Start: 2022-06-09 | End: 2022-06-10 | Stop reason: HOSPADM

## 2022-06-09 RX ORDER — OLANZAPINE 10 MG/2ML
1 INJECTION, POWDER, LYOPHILIZED, FOR SOLUTION INTRAMUSCULAR
Status: DISCONTINUED | OUTPATIENT
Start: 2022-06-09 | End: 2022-06-10 | Stop reason: HOSPADM

## 2022-06-09 RX ORDER — INSULIN LISPRO 100 [IU]/ML
0-7 INJECTION, SOLUTION INTRAVENOUS; SUBCUTANEOUS
Status: DISCONTINUED | OUTPATIENT
Start: 2022-06-09 | End: 2022-06-10

## 2022-06-09 RX ORDER — ACETAMINOPHEN 325 MG/1
650 TABLET ORAL EVERY 4 HOURS PRN
Status: DISCONTINUED | OUTPATIENT
Start: 2022-06-09 | End: 2022-06-10 | Stop reason: HOSPADM

## 2022-06-09 RX ORDER — SODIUM CHLORIDE 0.9 % (FLUSH) 0.9 %
10 SYRINGE (ML) INJECTION AS NEEDED
Status: DISCONTINUED | OUTPATIENT
Start: 2022-06-09 | End: 2022-06-10 | Stop reason: HOSPADM

## 2022-06-09 RX ORDER — METFORMIN HYDROCHLORIDE 500 MG/1
1000 TABLET, EXTENDED RELEASE ORAL
COMMUNITY
End: 2022-07-12 | Stop reason: SDUPTHER

## 2022-06-09 RX ORDER — ALUMINA, MAGNESIA, AND SIMETHICONE 2400; 2400; 240 MG/30ML; MG/30ML; MG/30ML
15 SUSPENSION ORAL EVERY 6 HOURS PRN
Status: DISCONTINUED | OUTPATIENT
Start: 2022-06-09 | End: 2022-06-10 | Stop reason: HOSPADM

## 2022-06-09 RX ORDER — DEXTROSE MONOHYDRATE 25 G/50ML
25 INJECTION, SOLUTION INTRAVENOUS
Status: DISCONTINUED | OUTPATIENT
Start: 2022-06-09 | End: 2022-06-10 | Stop reason: HOSPADM

## 2022-06-09 RX ORDER — SODIUM CHLORIDE 9 MG/ML
0.81 INJECTION, SOLUTION INTRAVENOUS ONCE
Status: COMPLETED | OUTPATIENT
Start: 2022-06-09 | End: 2022-06-09

## 2022-06-09 RX ORDER — NICOTINE POLACRILEX 4 MG
15 LOZENGE BUCCAL
Status: DISCONTINUED | OUTPATIENT
Start: 2022-06-09 | End: 2022-06-10 | Stop reason: HOSPADM

## 2022-06-09 RX ORDER — ATORVASTATIN CALCIUM 40 MG/1
40 TABLET, FILM COATED ORAL NIGHTLY
Status: DISCONTINUED | OUTPATIENT
Start: 2022-06-09 | End: 2022-06-10 | Stop reason: HOSPADM

## 2022-06-09 RX ADMIN — INSULIN LISPRO 3 UNITS: 100 INJECTION, SOLUTION INTRAVENOUS; SUBCUTANEOUS at 12:23

## 2022-06-09 RX ADMIN — Medication 10 ML: at 21:22

## 2022-06-09 RX ADMIN — IOPAMIDOL 100 ML: 755 INJECTION, SOLUTION INTRAVENOUS at 08:03

## 2022-06-09 RX ADMIN — Medication 10 ML: at 10:17

## 2022-06-09 RX ADMIN — SODIUM CHLORIDE 75 ML/HR: 9 INJECTION, SOLUTION INTRAVENOUS at 13:04

## 2022-06-09 RX ADMIN — PANTOPRAZOLE SODIUM 40 MG: 40 INJECTION, POWDER, FOR SOLUTION INTRAVENOUS at 11:45

## 2022-06-09 RX ADMIN — INSULIN LISPRO 2 UNITS: 100 INJECTION, SOLUTION INTRAVENOUS; SUBCUTANEOUS at 17:47

## 2022-06-09 RX ADMIN — ALTEPLASE 8.82 MG: KIT at 09:08

## 2022-06-09 RX ADMIN — SODIUM CHLORIDE 75 ML/HR: 9 INJECTION, SOLUTION INTRAVENOUS at 10:29

## 2022-06-09 RX ADMIN — Medication 10 ML: at 11:30

## 2022-06-09 RX ADMIN — ALTEPLASE 79.38 MG: KIT at 09:09

## 2022-06-09 RX ADMIN — SODIUM CHLORIDE 0.81 ML/KG/HR: 9 INJECTION, SOLUTION INTRAVENOUS at 10:09

## 2022-06-09 RX ADMIN — INSULIN LISPRO 3 UNITS: 100 INJECTION, SOLUTION INTRAVENOUS; SUBCUTANEOUS at 21:20

## 2022-06-09 RX ADMIN — ATORVASTATIN CALCIUM 40 MG: 40 TABLET, FILM COATED ORAL at 21:20

## 2022-06-09 NOTE — CASE MANAGEMENT/SOCIAL WORK
Discharge Planning Assessment   [facility]     Patient Name: Glenroy Penaloza  MRN: [id number]  Today's Date: 6/9/2022    Admit Date: 6/9/2022     Discharge Needs Assessment    No documentation.                Discharge Plan     Row Name 06/09/22 0917       Plan    Plan Comments Pt currently receiving alteplase and being monitored during infusion. Not case managed at this time.              Lynn Coffey RN, [facility]  Office: [phone]  Fax: [phone]  [email or web address]        Lynn Coffey RN

## 2022-06-09 NOTE — PLAN OF CARE
Goal Outcome Evaluation:  Plan of Care Reviewed With: patient        Progress: no change  Outcome Evaluation: SLP orders generated via stroke order set. Pt has passed swallow screen in ED, placed on regular diet per MD orders. Confirmed with pt and RN who endorse no difficulties. Skilled bedside swallow evaluation deferred at this time.     No initial focal complaints of speech, language, or oral motor difficulties. NIH 0, awaiting MRI s/p TPA.    SLP will follow peripherally pending clinical course to determine if speech language evaluation is indicated.

## 2022-06-09 NOTE — CASE MANAGEMENT/SOCIAL WORK
Discharge Planning Assessment  [facility]     Patient Name: Glenroy Penaloza  MRN: [id number]  Today's Date: 6/9/2022    Admit Date: 6/9/2022     Discharge Needs Assessment     Row Name 06/09/22 1007       Living Environment    People in Home spouse;child(saumya), dependent    Name(s) of People in Home Spouse Amelia. Teenage son    Current Living Arrangements home    Primary Care Provided by self    Provides Primary Care For child(saumya)    Family Caregiver if Needed spouse    Family Caregiver Names Amelia    Quality of Family Relationships supportive    Able to Return to Prior Arrangements yes       Resource/Environmental Concerns    Resource/Environmental Concerns none    Transportation Concerns none       Transition Planning    Patient/Family Anticipates Transition to home with family    Patient/Family Anticipated Services at Transition none    Transportation Anticipated family or friend will provide  Spouse Amelia       Discharge Needs Assessment    Equipment Currently Used at Home bp cuff    Concerns to be Addressed denies needs/concerns at this time    Anticipated Changes Related to Illness none    Equipment Needed After Discharge none;other (see comments)  Await PT/OT margareth               Discharge Plan     Row Name 06/09/22 1008       Plan    Plan Home with spouse. PT/OT/SLP evals pending    Patient/Family in Agreement with Plan yes    Plan Comments  spoke with patient and spouse at bedside. Pharmacy and PCP confirmed. Pt reports self to be independent with ADLs. Intends to return home with spouse at dc, who states she can assist patient if needed.Pt doesn't check blood sugar at home.Reports he has never been told to do so. Order noted for diabetic educator to see. Pt denies dc needs at present. PT/OT/SLP margareth pending.    Row Name 06/09/22 0917       Plan    Plan Comments Pt currently receiving alteplase and being monitored during infusion. Not case managed at this time.              Continued Care and Services  - Admitted Since 6/9/2022    Coordination has not been started for this encounter.          Demographic Summary     Row Name 06/09/22 1006       General Information    Admission Type inpatient    Arrived From home    Referral Source admission list;high risk screening;hospital clinician/department    Reason for Consult discharge planning    Preferred Language English       Contact Information    Permission Granted to Share Info With ;family/designee               Functional Status     Row Name 06/09/22 1007       Functional Status    Usual Activity Tolerance good    Current Activity Tolerance good       Functional Status, IADL    Medications independent    Meal Preparation independent    Housekeeping independent    Laundry independent    Shopping independent              Lynn Coffey RN, [facility]  Office: [phone]  Fax: [phone]  [email or web address]      I met with patient in room wearing PPE: mask and goggles.     Maintained distance greater than six feet and spent </=15 minutes in the room          Lynn Coffey RN

## 2022-06-09 NOTE — NURSING NOTE
"Pt reported increased heaviness in right leg, states it feels \"more asleep.\" MRI order has been placed and plans to take pt at 1600. Due to no severe symptom changes, pt will remain on schedule for 1600 MRI.  "

## 2022-06-09 NOTE — H&P
Patient Care Team:  Rosie Vela APRN as PCP - General (Nurse Practitioner)    Chief complaint CVA        Assessment & Plan     57-year-old male presented with acute CVA new onset right-sided numbness occurred 10 minutes prior to arrival while he was driving    Past medical history of hypertension, hyperlipidemia, diabetes mellitus  On evaluation by the ER symptoms appear to be worsening CT head showed no intracranial hemorrhage mild white matter findings suggestive of chronic microvascular disease, CT angiogram head no significant stenosis patent vertebral arteries no large vessel occlusion with patent carotid arteries    Neurology was consulted tPA was given at 9:08 AM    Plan  ICU care for neurochecks  MRI later today  CT scan after 24-hour  Aspirin after 24 hours  Blood pressure control  Glycemic control  IV fluid normal saline 75 cc an hour      History    57-year-old male presented with acute CVA new onset right-sided numbness occurred 10 minutes prior to arrival while he was driving    Past medical history of hypertension, hyperlipidemia, diabetes mellitus  On evaluation by the ER symptoms appear to be worsening CT head showed no intracranial hemorrhage mild white matter findings suggestive of chronic microvascular disease, CT angiogram head no significant stenosis patent vertebral arteries no large vessel occlusion with patent carotid arteries    Neurology was consulted tPA was given at 9:08 AM      Numbness and tingling of right upper and lower extremity      Past Medical History:   Diagnosis Date   • Atypical rash    • Diabetes mellitus, type 2 (HCC)    • Disorder of male genital organs    • Elevated glucose    • Essential hypertension 07/30/2020   • Glucosuria    • Hyperlipidemia        Past Surgical History:   Procedure Laterality Date   • COLONOSCOPY N/A 4/4/2022    Procedure: COLONOSCOPY WITH POLYPECTOMY;  Surgeon: Arsen Payan MD;  Location: [facility] ENDOSCOPY;  Service:  Gastroenterology;  Laterality: N/A;  COLON POLYP, HEMORRHOIDS       Family History   Problem Relation Age of Onset   • Cancer Mother    • Cancer Father    • Cancer Maternal Uncle    • Cancer Other    • Colon cancer Neg Hx    • Malig Hyperthermia Neg Hx        Social History     Socioeconomic History   • Marital status:    Tobacco Use   • Smoking status: Never Smoker   • Smokeless tobacco: Former User     Quit date: 8/1/2021   Vaping Use   • Vaping Use: Never used   Substance and Sexual Activity   • Alcohol use: Yes     Comment: current some day    • Drug use: Never       Review of Systems  Review of Systems   Neurological: Positive for syncope and numbness. Negative for seizures and speech difficulty.        Vital Signs  Temp:  [97.8 °F (36.6 °C)-98.6 °F (37 °C)] 98.6 °F (37 °C)  Heart Rate:  [65-97] 65  Resp:  [12-18] 14  BP: (142-196)/() 158/103    Physical Exam:  Physical Exam      Radiology  Imaging Results (Last 24 Hours)     Procedure Component Value Units Date/Time    CT Angiogram Head w AI Analysis of LVO [[id number]] Collected: 06/09/22 0815     Updated: 06/09/22 0910    Narrative:         DATE OF EXAM:  6/9/2022 8:01 AM     PROCEDURE:  CT ANGIOGRAM HEAD W AI ANALYSIS OF LVO-, CT ANGIOGRAM NECK-     INDICATIONS:   Acute Stroke     COMPARISON:   Atherosclerotic heart disease     TECHNIQUE:  CTA of the head and CTA of the neck was performed after the intravenous  administration of Isovue 370. Reconstructed coronal and sagittal images  were also obtained. In addition, a 3 D volume rendered image was  obtained after post processing. Automated exposure control and iterative  reconstruction methods were used. AI analysis of LVO was utilized for  the CTA Head imaging portion of the study.      FINDINGS:  VASCULAR FINDINGS:   Thoracic aortic branch vasculature is normally opacified with contrast.  The bilateral vertebral arteries are patent. The left vertebral artery  is dominant.      The left common  carotid artery, internal carotid artery, and external  carotid arteries are patent. There is mild calcified atherosclerotic  plaque at the left carotid bifurcation without significant stenosis.      The right common carotid artery, internal carotid artery, and external  carotid arteries are patent. There is mild calcified atherosclerotic  plaque at the right carotid bifurcation without significant stenosis.     The distal right and left ICA are patent to the [place] of [place]. Both  anterior cerebral arteries are patent. The proximal middle cerebral  arteries are patent and the M1 and visualized M2 segments. There is no  central large vessel occlusion. The basilar artery is patent. Both  posterior cerebral arteries are patent. Superior cerebellar arteries are  patent. There is no significant intracranial aneurysm identified. The  intracranial venous vasculature appears normally opacified.     NONVASCULAR FINDINGS:   Upper chest demonstrates cardiomegaly with coronary artery  calcifications. There is no central pulmonary embolus. Lung apices are  without consolidation. Parotid and sublingual glands without acute  abnormality. No adenopathy in the neck.  Parapharyngeal soft tissues  appear unremarkable. Glottic and supraglottic soft tissues appear within  normal limits. The mastoid air cells are well-aerated. Mild sinus  thickening at the bases of the left and right maxillary sinus. There is  no sinus fluid level. Mandible intact. Slight rightward deviation of the  nasal septum. Cervical vertebral bodies maintained in height. Negative  for acute osseous abnormality.     There is no intracranial hemorrhage. No midline shift or mass effect.  Ventricles and cortical sulci are normally configured. No abnormal  extra-axial fluid collection. Globes symmetric. No retro-orbital  abnormality.       Impression:      1. Patent major intracranial vasculature without findings of large  vessel occlusion.  2. Patent carotid arteries  with mild atherosclerotic calcification. No  significant stenosis by NASCET criteria.  2. Patent vertebral arteries.  3. Incidental CT findings above.     I discussed the findings with Dr. Chatman at 8:12 AM on 06/09/2022.     Electronically Signed By-Christofer Rosario MD On:6/9/2022 8:28 AM  This report was finalized on [id number] by  Christofer Rosario MD.    CT Angiogram Neck [[id number]] Collected: 06/09/22 0815     Updated: 06/09/22 0910    Narrative:         DATE OF EXAM:  6/9/2022 8:01 AM     PROCEDURE:  CT ANGIOGRAM HEAD W AI ANALYSIS OF LVO-, CT ANGIOGRAM NECK-     INDICATIONS:   Acute Stroke     COMPARISON:   Atherosclerotic heart disease     TECHNIQUE:  CTA of the head and CTA of the neck was performed after the intravenous  administration of Isovue 370. Reconstructed coronal and sagittal images  were also obtained. In addition, a 3 D volume rendered image was  obtained after post processing. Automated exposure control and iterative  reconstruction methods were used. AI analysis of LVO was utilized for  the CTA Head imaging portion of the study.      FINDINGS:  VASCULAR FINDINGS:   Thoracic aortic branch vasculature is normally opacified with contrast.  The bilateral vertebral arteries are patent. The left vertebral artery  is dominant.      The left common carotid artery, internal carotid artery, and external  carotid arteries are patent. There is mild calcified atherosclerotic  plaque at the left carotid bifurcation without significant stenosis.      The right common carotid artery, internal carotid artery, and external  carotid arteries are patent. There is mild calcified atherosclerotic  plaque at the right carotid bifurcation without significant stenosis.     The distal right and left ICA are patent to the [place] of [place]. Both  anterior cerebral arteries are patent. The proximal middle cerebral  arteries are patent and the M1 and visualized M2 segments. There is no  central large vessel occlusion. The  basilar artery is patent. Both  posterior cerebral arteries are patent. Superior cerebellar arteries are  patent. There is no significant intracranial aneurysm identified. The  intracranial venous vasculature appears normally opacified.     NONVASCULAR FINDINGS:   Upper chest demonstrates cardiomegaly with coronary artery  calcifications. There is no central pulmonary embolus. Lung apices are  without consolidation. Parotid and sublingual glands without acute  abnormality. No adenopathy in the neck.  Parapharyngeal soft tissues  appear unremarkable. Glottic and supraglottic soft tissues appear within  normal limits. The mastoid air cells are well-aerated. Mild sinus  thickening at the bases of the left and right maxillary sinus. There is  no sinus fluid level. Mandible intact. Slight rightward deviation of the  nasal septum. Cervical vertebral bodies maintained in height. Negative  for acute osseous abnormality.     There is no intracranial hemorrhage. No midline shift or mass effect.  Ventricles and cortical sulci are normally configured. No abnormal  extra-axial fluid collection. Globes symmetric. No retro-orbital  abnormality.       Impression:      1. Patent major intracranial vasculature without findings of large  vessel occlusion.  2. Patent carotid arteries with mild atherosclerotic calcification. No  significant stenosis by NASCET criteria.  2. Patent vertebral arteries.  3. Incidental CT findings above.     I discussed the findings with Dr. Chatman at 8:12 AM on 06/09/2022.     Electronically Signed By-Christofer Rosario MD On:6/9/2022 8:28 AM  This report was finalized on [id number] by  Christofer Rosario MD.    XR Chest 1 View [[id number]] Collected: 06/09/22 0831     Updated: 06/09/22 0833    Narrative:      DATE OF EXAM:  6/9/2022 8:20 AM     PROCEDURE:  XR CHEST 1 VW-     INDICATIONS:  Acute Stroke Protocol (onset < 12 hrs)       COMPARISON:  None     TECHNIQUE:   Single radiographic view of the chest was  obtained.     FINDINGS:  The lungs are clear. Heart size is normal. There is no pleural effusion  or pneumothorax. There are no acute osseous abnormalities.       Impression:      No acute chest findings.     Electronically Signed By-Ailyn Farfan MD On:6/9/2022 8:31 AM  This report was finalized on [id number] by  Ailyn Farfan MD.    CT Head Without Contrast Stroke Protocol [[id number]] Collected: 06/09/22 0805     Updated: 06/09/22 0810    Narrative:         DATE OF EXAM:  6/9/2022 7:55 AM     PROCEDURE:   CT HEAD WO CONTRAST STROKE PROTOCOL-     INDICATIONS: Right-sided numbness, tingling     COMPARISON:   No Comparisons Available     TECHNIQUE:  Routine transaxial and coronal reconstruction images were obtained  through the head without the administration of contrast. Automated  exposure control and iterative reconstruction methods were used.     FINDINGS:  There is no intracranial hemorrhage. Negative for mass effect or midline  shift. The ventricles and cortical sulci are normally configured. There  are mild areas of subcortical white matter hypodensity suggesting  changes of chronic microvascular disease. Posterior fossa is without  acute abnormality. No abnormal extra-axial fluid collection. Globes  symmetric. No retro-orbital abnormality. The mastoid air cells are  well-aerated. No calvarial fracture.        Impression:      IMPRESSION :  1. No intracranial hemorrhage.  2. Mild white matter findings most compatible with chronic microvascular  disease.     Electronically Signed By-Christofer Rosario MD On:6/9/2022 8:08 AM  This report was finalized on [id number] by  Christofer Rosario MD.          Labs:  Results from last 7 days   Lab Units 06/09/22  0803   WBC 10*3/mm3 6.10   HEMOGLOBIN g/dL 15.5   HEMATOCRIT % 44.5   PLATELETS 10*3/mm3 177     Results from last 7 days   Lab Units 06/09/22  0802   SODIUM mmol/L 138   POTASSIUM mmol/L 3.9   CHLORIDE mmol/L 100   CO2 mmol/L 24.0   BUN mg/dL 15   CREATININE mg/dL  1.05   CALCIUM mg/dL 9.9   BILIRUBIN mg/dL 0.4   ALK PHOS U/L 57   ALT (SGPT) U/L 73*   AST (SGOT) U/L 57*   GLUCOSE mg/dL 230*         Results from last 7 days   Lab Units 06/09/22  0802   ALBUMIN g/dL 4.50     Results from last 7 days   Lab Units 06/09/22  0802   TROPONIN T ng/mL <0.010             Results from last 7 days   Lab Units 06/09/22  0802   INR  0.95   APTT seconds 24.9               Meds:   SCHEDULE  [START ON 6/10/2022] aspirin, 325 mg, Oral, Daily   Or  [START ON 6/10/2022] aspirin, 300 mg, Rectal, Daily  atorvastatin, 80 mg, Oral, Nightly  pantoprazole, 40 mg, Intravenous, Q AM  sodium chloride, 10 mL, Intravenous, Q12H  sodium chloride, 10 mL, Intravenous, Q12H      Infusions  sodium chloride, 75 mL/hr, Last Rate: 75 mL/hr (06/09/22 1029)      PRNs  •  acetaminophen **OR** acetaminophen  •  aluminum-magnesium hydroxide-simethicone  •  labetalol  •  nitroglycerin  •  ondansetron **OR** ondansetron  •  sodium chloride  •  sodium chloride  •  sodium chloride      I discussed the patients findings and my recommendations with patient and primary care team.     Paige Merlos MD  06/09/22  11:59 EDT    Time: Critical care 70 min

## 2022-06-09 NOTE — SIGNIFICANT NOTE
06/09/22 1157   OTHER   Discipline physical therapist   Rehab Time/Intention   Session Not Performed unable to evaluate, medical status change;other (see comments)  (tPA administered this AM; hold PT today)   Recommendation   PT - Next Appointment 06/10/22

## 2022-06-09 NOTE — CONSULTS
Primary Care Provider: Rosie Vela APRN     Consult requested by: ER team  Reason for Consultation: Neurological evaluation /code stroke    History taken from: patient chart family RN    Chief complaint: Numbness on the right side       SUBJECTIVE:    History of present illness: Background per H&P: Glenroy Penaloza is a 57 y.o. year old male who was evaluated in room ICU 2307 at [facility]    Source of information is the patient and my discussion with the team members    This gentleman was in usual state of health and he was driving to work and he was chewing his sunflower seeds and noticed that his face or tongue was numb.  He then noticed that his right upper extremity and also the right lower extremity was numb.  This was to the extent that he had to use his left foot to break because he wanted to make sure his sensation is okay and he can put appropriate pressure.  There was no weakness    So he came to the ER and initial NIH was very low with some numbness only and I was concerned about ataxia and initially it was not reported.  Even the ER team got him up and had him walk.  When he came back from his CT, negative, CTA also not showing anything major my concern was that he started feeling worse.  He said that when he is walking he did not know where his foot was.  Because he was within the window and getting worse and we did not want to miss a chance to lose the window of opportunity of treating him with alteplase, after discussing with him and going through the inclusion exclusion criteria he was given alteplase.  He is stable but still has some numbness on the right side.  I evaluated him in the ICU and he is now ataxic in the right upper extremity also.  MRI and further work-up will be done    I had very detailed discussion with him regarding his risk factors which she has plenty!  Alteplase and further work-up process was explained to him  His initial blood pressure was slightly elevated but is  relatively stable now    No head injury or migraine or seizures  No prior stroke or TIA  He is not on aspirin or lipid-lowering medication  He used to chew tobacco but not anymore    As per admitting 57-year-old male presented with acute CVA new onset right-sided numbness occurred 10 minutes prior to arrival while he was driving     Past medical history of hypertension, hyperlipidemia, diabetes mellitus  On evaluation by the ER symptoms appear to be worsening CT head showed no intracranial hemorrhage mild white matter findings suggestive of chronic microvascular disease, CT angiogram head no significant stenosis patent vertebral arteries no large vessel occlusion with patent carotid arteries     Neurology was consulted tPA was given at 9:08 AM       Numbness and tingling of right upper and lower extremity      As per ER team,  History of Present Illness  History Provided By: Pt     Chief Complaint: Numbness on right face, right arm, right leg  Onset: 10 minutes prior to arrival  Timing: Worsening  Location: Right face, right arm, right leg  Quality: Decree sensation, not completely numb  Severity: Mild to moderate  Modifying Factors: None     Other: 57-year-old male with prior medical history of hypertension, hyperlipidemia, diabetes presents for new onset right-sided numbness.  Occurred approximately 10 minutes prior to arrival while he was driving.  Never had anything like this before.  No weakness.  Denies any issues walking.  No other recent symptoms.  Does not feel dizzy.     Review of Systems   Cardiovascular: Negative for chest pain.   Neurological: Positive for numbness. Negative for dizziness, seizures, syncope, facial asymmetry, speech difficulty, weakness, light-headedness and headaches.   All other systems reviewed and are negative.       Symptoms appear to be worsening on reevaluation.  Discussed with Dr. No who recommends giving tPA given symptoms worsening and now patient having difficulties  walking on reassessment secondary to numbness in his foot.  No contraindications.  Patient denies history of GI or intracranial hemorrhage.  Will give tPA.    - Portions of the above HPI were copied from previous encounters and edited as appropriate. PMH as detailed below.     Review of Systems   No fever chills rigors or sweats  No weight issues  No sleep problems  HEENT:  No speech problem, vision changes, facial asymmetry or pain, or neck problem  Chest: No chest pain, clubbing, cyanosis, orthopnea palpitations  Pulmonary:  No shortness of air, cough or expectoration  Abdomen:  No swelling/tension, constipation,diarrhea or pain  No genitourinary symptoms  Extremity problems as discussed  No back problem  No psychotic issues  Neurologic issues as discussed  No hematologic, dermatologic or endocrine problems he is diabetic though        PATIENT HISTORY:  Past Medical History:   Diagnosis Date   • Atypical rash    • Diabetes mellitus, type 2 (HCC)    • Disorder of male genital organs    • Elevated glucose    • Essential hypertension 07/30/2020   • Glucosuria    • Hyperlipidemia    ,   Past Surgical History:   Procedure Laterality Date   • COLONOSCOPY N/A 4/4/2022    Procedure: COLONOSCOPY WITH POLYPECTOMY;  Surgeon: Arsen Payan MD;  Location: [facility] ENDOSCOPY;  Service: Gastroenterology;  Laterality: N/A;  COLON POLYP, HEMORRHOIDS   ,   Family History   Problem Relation Age of Onset   • Cancer Mother    • Cancer Father    • Cancer Maternal Uncle    • Cancer Other    • Colon cancer Neg Hx    • Malig Hyperthermia Neg Hx    ,   Social History     Tobacco Use   • Smoking status: Never Smoker   • Smokeless tobacco: Former User     Quit date: 8/1/2021   Vaping Use   • Vaping Use: Never used   Substance Use Topics   • Alcohol use: Yes     Comment: current some day    • Drug use: Never   ,   Prior to Admission medications    Medication Sig Start Date End Date Taking? Authorizing Provider    lisinopril-hydrochlorothiazide (PRINZIDE,ZESTORETIC) 10-12.5 MG per tablet Take 1 tablet by mouth Daily. 1/20/22  Yes Rosie Vela APRN   lovastatin (MEVACOR) 40 MG tablet Take 1 tablet by mouth Daily With Dinner. 1/20/22  Yes Rosie Vela APRN   metFORMIN ER (GLUCOPHAGE-XR) 500 MG 24 hr tablet Take 1,000 mg by mouth Daily With Breakfast.   Yes Ramón Salas MD   metFORMIN ER (GLUCOPHAGE-XR) 500 MG 24 hr tablet Take 500 mg by mouth Daily With Dinner.   Yes Ramón Salas MD   metFORMIN ER (GLUCOPHAGE-XR) 500 MG 24 hr tablet Take 2 in the morning and 1 in the evening.  Patient taking differently: Take 1,000 mg by mouth. Take 2 in the morning and 1 in the evening. 2/11/22 6/9/22 Yes Rosie Vela APRN   hydroCHLOROthiazide (HYDRODIURIL) 12.5 MG tablet Take 12.5 mg by mouth Daily.  6/9/22  ProviderRamón MD   polyethylene glycol (GoLYTELY) 236 g solution Starting at noon on day prior to procedure, drink 8 ounces every 30 minutes until all gone or stools are clear. May add flavor packet.  Patient taking differently: Take 4 L by mouth Every 12 (Twelve) Hours. 2/17/22 6/9/22  Yarely Reveles APRN    Allergies:  Patient has no known allergies.    Current Facility-Administered Medications   Medication Dose Route Frequency Provider Last Rate Last Admin   • acetaminophen (TYLENOL) tablet 650 mg  650 mg Oral Q4H PRN Keshav Cesar APRN        Or   • acetaminophen (TYLENOL) suppository 650 mg  650 mg Rectal Q4H PRN Keshav Cesar APRN       • aluminum-magnesium hydroxide-simethicone (MAALOX MAX) 400-400-40 MG/5ML suspension 15 mL  15 mL Oral Q6H PRN Keshav Cesar APRN       • [START ON 6/10/2022] aspirin tablet 325 mg  325 mg Oral Daily Keshav Cesar APRN        Or   • [START ON 6/10/2022] aspirin suppository 300 mg  300 mg Rectal Daily Keshav Cesar APRN       • atorvastatin (LIPITOR) tablet 80 mg  80 mg Oral Nightly Keshav Cesar APRN       • dextrose (D50W) (25  g/50 mL) IV injection 25 g  25 g Intravenous Q15 Min PRN Keshav Cesar APRN       • dextrose (GLUTOSE) oral gel 15 g  15 g Oral Q15 Min PRN Keshav Cesar APRN       • glucagon (human recombinant) (GLUCAGEN DIAGNOSTIC) 1 mg in sterile water (preservative free) 1 mL injection  1 mg Intramuscular Q15 Min PRN Keshav Cesar APRN       • insulin lispro (ADMELOG) injection 0-7 Units  0-7 Units Subcutaneous 4x Daily With Meals & Nightly Keshav Cesar APRN        And   • insulin lispro (ADMELOG) injection 0-7 Units  0-7 Units Subcutaneous PRN Keshav Cesar APRN       • labetalol (NORMODYNE,TRANDATE) injection 10 mg  10 mg Intravenous Q10 Min PRN Keshav Cesar APRN       • nitroglycerin (NITROSTAT) SL tablet 0.4 mg  0.4 mg Sublingual Q5 Min PRN Keshav Cesar APRN       • ondansetron (ZOFRAN) tablet 4 mg  4 mg Oral Q6H PRN Keshav Cesar APRN        Or   • ondansetron (ZOFRAN) injection 4 mg  4 mg Intravenous Q6H PRN Keshav Cesar APRN       • pantoprazole (PROTONIX) injection 40 mg  40 mg Intravenous Q AM Keshav Cesar APRN   40 mg at 06/09/22 1145   • sodium chloride 0.9 % flush 10 mL  10 mL Intravenous PRN Mehran Chatman MD       • sodium chloride 0.9 % flush 10 mL  10 mL Intravenous Q12H Kehsav Cesar APRN   10 mL at 06/09/22 1017   • sodium chloride 0.9 % flush 10 mL  10 mL Intravenous PRN Keshav Cesar APRN       • sodium chloride 0.9 % flush 10 mL  10 mL Intravenous Q12H Keshav Cesar APRN   10 mL at 06/09/22 1130   • sodium chloride 0.9 % flush 10 mL  10 mL Intravenous PRN Keshav Cesar APRN       • sodium chloride 0.9 % infusion  75 mL/hr Intravenous Continuous Keshav Cesar APRN 75 mL/hr at 06/09/22 1029 75 mL/hr at 06/09/22 1029        ________________________________________________________        OBJECTIVE:  Upon today's exam, the gentleman is resting comfortably in bed in no acute distress     Neurologic Exam    The patient is awake and alert and oriented x3      Cranial nerve examination demonstrate:  Full fields of vision to confrontation  Pupils are round, reactive to light and accommodation and size of about 3 mm  No ptosis or nystagmus  Funduscopic examination was not successful  Eye movements are conjugate     Sensation on the face and scalp are normal  Muscles of mastication are normal and symmetric  Muscles of  facial expression are normal and symmetric  Hearing is intact bilaterally  Head turning and shoulder shrugs were unremarkable  Tongue was midline  I could not visualize her oropharynx or uvula     Motor examination:  Normal bulk, tone and strength was 5/5  No fasciculations     Sensory examination:  Decreased sensation on the face on the right side, right upper extremity and right lower extremity  Romberg was not evaluated     Reflexes:  0/4     Coordination:  Some ataxia in the right upper extremity for finger-to-nose to finger but otherwise left side and bilateral lower extremities are normal for rapid alternating movements, finger-nose to finger and in the lower extremities heel-to-shin bilaterally     Gait:  Deferred     Toe signs:  Mute    NIH stroke scale  NIHSS:    Level Of Consciousness 0  0=Alert; keenly responsive 1=Not alert, but arousable by minor stimulation 2=Not alert, requires repeated stimulation 3=Responds only with reflex movements    LOC Questions to Month and age 0  0=Answers both questions correctly 1=Answers one question correctly 2=Answers neither question correctly    LOC Commands      -Open/Close eyes 0    -Open/close  0   0=Performs both tasks correctly 1=Performs one task correctly 2=Performs neighter task correctly     Best Gaze 0  0=Normal 1=Partial gaze palsy 2=Forced deviation, or total gaze paresis    Visual 0  0=No visual loss 1=Partial hemianopia 2=Complete hemianopia 3=Bilateral hemianopia (blind including cortical blindness)    Facial Palsy 0  0=Normal symmetrical movement 1=Minor paralysis (asymmetry) 2=Partial  paralysis (lower facde) 3=Complete paralysis (upper and lower face)    Motor: Left Arm-0  Left leg-0; Right Arm-0 Right Leg-0  0=No drift, limb holds posture for full 10 seconds 1=Drift, limb holds posture, no drift to bed 2=Some antigravity effort, cannot maintain posture, drifts to bed 3=No effort against gravity, limb falls 4=No movement    Limb Ataxia 1  0=Absent 1=Present in one limb 2=Present in two limbs    Sensory 1   0=Normal 1=Mild to moderate sensory loss 2=Severe to total sensory loss    Best Language 0   0=No aphasia, normal 1=Mild to moderate aphasia 2=Severe Aphasia (very few words correct or understood)3=Multe, global aphasia    Dysarthria 0  0=Normal 1=Mild to moderate 2=Severe, unintelligible or mute/anarthric    Extinction/Neglect 0   0=No abnormality 1=Extinction to bilateral simultaneous stimulation 2=Profound neglect    Total  __2      ________________________________________________________   RESULTS REVIEW:    VITAL SIGNS:   Temp:  [97.8 °F (36.6 °C)-98.6 °F (37 °C)] 98.6 °F (37 °C)  Heart Rate:  [65-97] 65  Resp:  [12-18] 14  BP: (142-196)/() 158/103     LABS:      Lab 06/09/22  0803 06/09/22  0802   WBC 6.10  --    HEMOGLOBIN 15.5  --    HEMATOCRIT 44.5  --    PLATELETS 177  --    NEUTROS ABS 2.80  --    LYMPHS ABS 2.50  --    MONOS ABS 0.60  --    EOS ABS 0.20  --    MCV 89.4  --    PROTIME  --  9.8   APTT  --  24.9         Lab 06/09/22  0802   SODIUM 138   POTASSIUM 3.9   CHLORIDE 100   CO2 24.0   ANION GAP 14.0   BUN 15   CREATININE 1.05   EGFR 82.3   GLUCOSE 230*   CALCIUM 9.9         Lab 06/09/22  0802   TOTAL PROTEIN 7.2   ALBUMIN 4.50   GLOBULIN 2.7   ALT (SGPT) 73*   AST (SGOT) 57*   BILIRUBIN 0.4   ALK PHOS 57         Lab 06/09/22  0802   TROPONIN T <0.010   PROTIME 9.8   INR 0.95             Lab 06/09/22  0802   ABO TYPING AB   RH TYPING Positive   ANTIBODY SCREEN Negative         UA    Urinalysis 7/15/21 1/22/22 1/22/22     1147 1147   Squamous Epithelial Cells, UA   0-2    Specific Gravity, UA 1.018 >=1.030    Ketones, UA Negative Trace (A)    Blood, UA Negative Negative    Leukocytes, UA Negative Negative    Nitrite, UA Negative Negative    RBC, UA   None Seen   WBC, UA   0-2   Bacteria, UA   None Seen   (A) Abnormal value              Lab Results   Component Value Date    TSH 1.270 07/15/2021     (H) 01/22/2022    HGBA1C 9.90 (H) 01/22/2022    [id number] 363 02/20/2021       IMAGING STUDIES:  CT Angiogram Neck    Result Date: 6/9/2022  1. Patent major intracranial vasculature without findings of large vessel occlusion. 2. Patent carotid arteries with mild atherosclerotic calcification. No significant stenosis by NASCET criteria. 2. Patent vertebral arteries. 3. Incidental CT findings above.  I discussed the findings with Dr. Chatman at 8:12 AM on 06/09/2022.  Electronically Signed By-Christofer Rosario MD On:6/9/2022 8:28 AM This report was finalized on [id number] by  Christofer Rosario MD.    XR Chest 1 View    Result Date: 6/9/2022  No acute chest findings.  Electronically Signed By-Ailyn Farfan MD On:6/9/2022 8:31 AM This report was finalized on [id number] by  Ailyn Farfan MD.    CT Head Without Contrast Stroke Protocol    Result Date: 6/9/2022  IMPRESSION : 1. No intracranial hemorrhage. 2. Mild white matter findings most compatible with chronic microvascular disease.  Electronically Signed By-Christofer Rosario MD On:6/9/2022 8:08 AM This report was finalized on [id number] by  Christofer Rosario MD.    CT Angiogram Head w AI Analysis of LVO    Result Date: 6/9/2022  1. Patent major intracranial vasculature without findings of large vessel occlusion. 2. Patent carotid arteries with mild atherosclerotic calcification. No significant stenosis by NASCET criteria. 2. Patent vertebral arteries. 3. Incidental CT findings above.  I discussed the findings with Dr. Chatman at 8:12 AM on 06/09/2022.  Electronically Signed By-Christofer Rosario MD On:6/9/2022 8:28 AM This report was finalized on  [id number] by  Christofer Rosario MD.      I reviewed the patient's new clinical results.    ________________________________________________________     PROBLEM LIST:    Numbness and tingling of right upper and lower extremity            ASSESSMENT/PLAN:  Code stroke  Status post alteplase, received in ER  I suspect thalamic infarct  I had very detailed discussion with him regarding strokes and what types and why we are doing all the work-up    I will get MRI brain and echo and based on that we will decide future course of action  He does not have significant risk factors    Aspirin to be started after his 24-hour post alteplase CT is negative, if that is the case  Further work-up as per orders    Risk factors discussed with him in detail and also with his family    Modification of stroke risk factors:   - Blood pressure should be less than 130/80 outpatient, HbA1c less than 6.5, LDL less than 70; b12>500 and smoking cessation if applicable. We would be grateful if the primary team / primary care physician would keep a close watch on the above targets.  - Stroke education  - Follow up with neurologist of choice      I discussed the patient's findings and my recommendations with patient, family and nursing staff    Mikaela Lopez MD  06/09/22  12:19 EDT

## 2022-06-09 NOTE — PLAN OF CARE
Goal Outcome Evaluation:   Pt admitted today with right sided tingling and numbness while at work. TPA given at 0908. MRI obtained that resulted a left thalamus infarct. NP discussed results with pt per pt request. NIH remains at 2 due to ataxia and sensory abnormality. Neuro checks and modified NIH now hourly. VSS

## 2022-06-10 ENCOUNTER — READMISSION MANAGEMENT (OUTPATIENT)
Dept: CALL CENTER | Facility: HOSPITAL | Age: 57
End: 2022-06-10

## 2022-06-10 ENCOUNTER — APPOINTMENT (OUTPATIENT)
Dept: CT IMAGING | Facility: HOSPITAL | Age: 57
End: 2022-06-10

## 2022-06-10 VITALS
TEMPERATURE: 96.4 F | BODY MASS INDEX: 31.98 KG/M2 | HEIGHT: 68 IN | SYSTOLIC BLOOD PRESSURE: 130 MMHG | HEART RATE: 73 BPM | OXYGEN SATURATION: 95 % | RESPIRATION RATE: 19 BRPM | WEIGHT: 210.98 LBS | DIASTOLIC BLOOD PRESSURE: 82 MMHG

## 2022-06-10 PROBLEM — I63.81 ACUTE ISCHEMIC VBA THALAMIC STROKE, LEFT: Status: ACTIVE | Noted: 2022-06-10

## 2022-06-10 LAB
ALBUMIN SERPL-MCNC: 3.6 G/DL (ref 3.5–5.2)
ALBUMIN/GLOB SERPL: 1.4 G/DL
ALP SERPL-CCNC: 47 U/L (ref 39–117)
ALT SERPL W P-5'-P-CCNC: 55 U/L (ref 1–41)
ANION GAP SERPL CALCULATED.3IONS-SCNC: 7 MMOL/L (ref 5–15)
AST SERPL-CCNC: 48 U/L (ref 1–40)
BASOPHILS # BLD AUTO: 0 10*3/MM3 (ref 0–0.2)
BASOPHILS NFR BLD AUTO: 0.6 % (ref 0–1.5)
BILIRUB SERPL-MCNC: 0.5 MG/DL (ref 0–1.2)
BUN SERPL-MCNC: 15 MG/DL (ref 6–20)
BUN/CREAT SERPL: 16.9 (ref 7–25)
CALCIUM SPEC-SCNC: 8.5 MG/DL (ref 8.6–10.5)
CHLORIDE SERPL-SCNC: 102 MMOL/L (ref 98–107)
CHOLEST SERPL-MCNC: 182 MG/DL (ref 0–200)
CO2 SERPL-SCNC: 27 MMOL/L (ref 22–29)
CREAT SERPL-MCNC: 0.89 MG/DL (ref 0.76–1.27)
DEPRECATED RDW RBC AUTO: 40.7 FL (ref 37–54)
EGFRCR SERPLBLD CKD-EPI 2021: 99.3 ML/MIN/1.73
EOSINOPHIL # BLD AUTO: 0.2 10*3/MM3 (ref 0–0.4)
EOSINOPHIL NFR BLD AUTO: 3.8 % (ref 0.3–6.2)
ERYTHROCYTE [DISTWIDTH] IN BLOOD BY AUTOMATED COUNT: 13 % (ref 12.3–15.4)
GLOBULIN UR ELPH-MCNC: 2.5 GM/DL
GLUCOSE BLDC GLUCOMTR-MCNC: 226 MG/DL (ref 70–105)
GLUCOSE BLDC GLUCOMTR-MCNC: 230 MG/DL (ref 70–105)
GLUCOSE BLDC GLUCOMTR-MCNC: 243 MG/DL (ref 70–105)
GLUCOSE SERPL-MCNC: 221 MG/DL (ref 65–99)
HBA1C MFR BLD: 9.5 % (ref 3.5–5.6)
HCT VFR BLD AUTO: 40.4 % (ref 37.5–51)
HDLC SERPL-MCNC: 43 MG/DL (ref 40–60)
HGB BLD-MCNC: 14.4 G/DL (ref 13–17.7)
LDLC SERPL CALC-MCNC: 103 MG/DL (ref 0–100)
LDLC/HDLC SERPL: 2.27 {RATIO}
LYMPHOCYTES # BLD AUTO: 2.4 10*3/MM3 (ref 0.7–3.1)
LYMPHOCYTES NFR BLD AUTO: 43.5 % (ref 19.6–45.3)
MAGNESIUM SERPL-MCNC: 1.7 MG/DL (ref 1.6–2.6)
MCH RBC QN AUTO: 31.7 PG (ref 26.6–33)
MCHC RBC AUTO-ENTMCNC: 35.6 G/DL (ref 31.5–35.7)
MCV RBC AUTO: 89 FL (ref 79–97)
MONOCYTES # BLD AUTO: 0.5 10*3/MM3 (ref 0.1–0.9)
MONOCYTES NFR BLD AUTO: 8.7 % (ref 5–12)
NEUTROPHILS NFR BLD AUTO: 2.4 10*3/MM3 (ref 1.7–7)
NEUTROPHILS NFR BLD AUTO: 43.4 % (ref 42.7–76)
NRBC BLD AUTO-RTO: 0 /100 WBC (ref 0–0.2)
PHOSPHATE SERPL-MCNC: 2.9 MG/DL (ref 2.5–4.5)
PLATELET # BLD AUTO: 142 10*3/MM3 (ref 140–450)
PMV BLD AUTO: 7.8 FL (ref 6–12)
POTASSIUM SERPL-SCNC: 4.1 MMOL/L (ref 3.5–5.2)
PROT SERPL-MCNC: 6.1 G/DL (ref 6–8.5)
RBC # BLD AUTO: 4.54 10*6/MM3 (ref 4.14–5.8)
SODIUM SERPL-SCNC: 136 MMOL/L (ref 136–145)
TRIGL SERPL-MCNC: 206 MG/DL (ref 0–150)
VLDLC SERPL-MCNC: 36 MG/DL (ref 5–40)
WBC NRBC COR # BLD: 5.5 10*3/MM3 (ref 3.4–10.8)

## 2022-06-10 PROCEDURE — G0108 DIAB MANAGE TRN  PER INDIV: HCPCS

## 2022-06-10 PROCEDURE — 92523 SPEECH SOUND LANG COMPREHEN: CPT

## 2022-06-10 PROCEDURE — 82962 GLUCOSE BLOOD TEST: CPT

## 2022-06-10 PROCEDURE — 63710000001 INSULIN LISPRO (HUMAN) PER 5 UNITS: Performed by: INTERNAL MEDICINE

## 2022-06-10 PROCEDURE — 83036 HEMOGLOBIN GLYCOSYLATED A1C: CPT | Performed by: NURSE PRACTITIONER

## 2022-06-10 PROCEDURE — 84100 ASSAY OF PHOSPHORUS: CPT | Performed by: NURSE PRACTITIONER

## 2022-06-10 PROCEDURE — 97166 OT EVAL MOD COMPLEX 45 MIN: CPT

## 2022-06-10 PROCEDURE — 80053 COMPREHEN METABOLIC PANEL: CPT | Performed by: NURSE PRACTITIONER

## 2022-06-10 PROCEDURE — 80061 LIPID PANEL: CPT | Performed by: NURSE PRACTITIONER

## 2022-06-10 PROCEDURE — 63710000001 INSULIN GLARGINE PER 5 UNITS: Performed by: INTERNAL MEDICINE

## 2022-06-10 PROCEDURE — 99239 HOSP IP/OBS DSCHRG MGMT >30: CPT | Performed by: FAMILY MEDICINE

## 2022-06-10 PROCEDURE — 99232 SBSQ HOSP IP/OBS MODERATE 35: CPT | Performed by: PSYCHIATRY & NEUROLOGY

## 2022-06-10 PROCEDURE — 85025 COMPLETE CBC W/AUTO DIFF WBC: CPT | Performed by: NURSE PRACTITIONER

## 2022-06-10 PROCEDURE — 97161 PT EVAL LOW COMPLEX 20 MIN: CPT

## 2022-06-10 PROCEDURE — 83735 ASSAY OF MAGNESIUM: CPT | Performed by: NURSE PRACTITIONER

## 2022-06-10 PROCEDURE — 63710000001 INSULIN LISPRO (HUMAN) PER 5 UNITS: Performed by: NURSE PRACTITIONER

## 2022-06-10 PROCEDURE — 25010000002 ONDANSETRON PER 1 MG: Performed by: NURSE PRACTITIONER

## 2022-06-10 PROCEDURE — 70450 CT HEAD/BRAIN W/O DYE: CPT

## 2022-06-10 RX ORDER — ASPIRIN 81 MG/1
81 TABLET ORAL DAILY
Qty: 30 TABLET | Refills: 0 | Status: SHIPPED | OUTPATIENT
Start: 2022-06-10 | End: 2022-07-05

## 2022-06-10 RX ORDER — LANCETS
1 EACH MISCELLANEOUS DAILY
Qty: 100 EACH | Refills: 2 | Status: SHIPPED | OUTPATIENT
Start: 2022-06-10

## 2022-06-10 RX ORDER — INSULIN LISPRO 100 [IU]/ML
0-9 INJECTION, SOLUTION INTRAVENOUS; SUBCUTANEOUS AS NEEDED
Status: DISCONTINUED | OUTPATIENT
Start: 2022-06-10 | End: 2022-06-10 | Stop reason: HOSPADM

## 2022-06-10 RX ORDER — ATORVASTATIN CALCIUM 40 MG/1
40 TABLET, FILM COATED ORAL NIGHTLY
Qty: 30 TABLET | Refills: 0 | Status: SHIPPED | OUTPATIENT
Start: 2022-06-10 | End: 2022-06-13 | Stop reason: SDUPTHER

## 2022-06-10 RX ORDER — INSULIN LISPRO 100 [IU]/ML
0-9 INJECTION, SOLUTION INTRAVENOUS; SUBCUTANEOUS
Status: DISCONTINUED | OUTPATIENT
Start: 2022-06-10 | End: 2022-06-10 | Stop reason: HOSPADM

## 2022-06-10 RX ADMIN — ASPIRIN 325 MG ORAL TABLET 325 MG: 325 PILL ORAL at 07:52

## 2022-06-10 RX ADMIN — Medication 10 ML: at 08:09

## 2022-06-10 RX ADMIN — HYDROCHLOROTHIAZIDE: 12.5 TABLET ORAL at 09:03

## 2022-06-10 RX ADMIN — PANTOPRAZOLE SODIUM 40 MG: 40 INJECTION, POWDER, FOR SOLUTION INTRAVENOUS at 05:43

## 2022-06-10 RX ADMIN — ACETAMINOPHEN 650 MG: 325 TABLET ORAL at 04:28

## 2022-06-10 RX ADMIN — INSULIN LISPRO 3 UNITS: 100 INJECTION, SOLUTION INTRAVENOUS; SUBCUTANEOUS at 07:52

## 2022-06-10 RX ADMIN — ONDANSETRON 4 MG: 2 INJECTION INTRAMUSCULAR; INTRAVENOUS at 05:43

## 2022-06-10 RX ADMIN — INSULIN LISPRO 4 UNITS: 100 INJECTION, SOLUTION INTRAVENOUS; SUBCUTANEOUS at 11:27

## 2022-06-10 RX ADMIN — INSULIN GLARGINE 10 UNITS: 100 INJECTION, SOLUTION SUBCUTANEOUS at 11:27

## 2022-06-10 NOTE — NURSING NOTE
Pt with increasing amount of right-sided drift. Called Dr Lopez to evaluate symptoms. Per Jessica, this is not an unexpected finding for thalamic stroke. Continue to monitor.

## 2022-06-10 NOTE — PLAN OF CARE
Goal Outcome Evaluation:  Plan of Care Reviewed With: patient        Progress: no change  Outcome Evaluation: Cognitive linguistic evaluation completed with utilization of informal means. New L thalamic infarct, pt primary complaints of R sensory loss and drift. Pt continues work as , driving, IADLs.   Problem solving for occupational and home based scenarios 100% I';y. Memory with 100% 3/3 item recall with 10 min delay, paragraph comprehension 100% 5 minute delay. Judgement, deficit awareness, insight intact given immediate medical hx.     Discussed SLP scope of practice, availability for in depth workup if difficulties arise. No additional SLP services appear indicated at this time, cognition at baseline.     Will sign off. Please re-consult with changes in condition, or if additional workup is indicated.

## 2022-06-10 NOTE — THERAPY EVALUATION
Patient Name: Glenroy Penaloza  : 1965    MRN: [id number]                              Today's Date: 6/10/2022       Admit Date: 2022    Visit Dx:     ICD-10-CM ICD-9-CM   1. Acute CVA (cerebrovascular accident) (HCC)  I63.9 434.91   2. Type 2 diabetes mellitus with other specified complication, without long-term current use of insulin (HCC)  E11.69 250.80     Patient Active Problem List   Diagnosis   • Diabetes mellitus, type 2 (HCC)   • Essential hypertension   • Hyperlipidemia   • Hypermetropia   • Encounter for screening for malignant neoplasm of colon   • Numbness and tingling of right upper and lower extremity   • Acute ischemic thalamic stroke, left (HCC)     Past Medical History:   Diagnosis Date   • Atypical rash    • Diabetes mellitus, type 2 (HCC)    • Disorder of male genital organs    • Elevated glucose    • Essential hypertension 2020   • Glucosuria    • Hyperlipidemia      Past Surgical History:   Procedure Laterality Date   • COLONOSCOPY N/A 2022    Procedure: COLONOSCOPY WITH POLYPECTOMY;  Surgeon: Arsen Payan MD;  Location: [facility] ENDOSCOPY;  Service: Gastroenterology;  Laterality: N/A;  COLON POLYP, HEMORRHOIDS      General Information     Row Name 06/10/22 1242          Physical Therapy Time and Intention    Document Type evaluation  -     Mode of Treatment physical therapy  -     Row Name 06/10/22 1242          General Information    Patient Profile Reviewed yes  -HC     Prior Level of Function independent:;work;driving  -     Row Name 06/10/22 1242          Living Environment    People in Home spouse  -     Row Name 06/10/22 1242          Home Main Entrance    Number of Stairs, Main Entrance four  -HC     Row Name 06/10/22 1242          Cognition    Orientation Status (Cognition) oriented x 4  -HC     Row Name 06/10/22 1242          Safety Issues, Functional Mobility    Impairments Affecting Function (Mobility) sensation/sensory awareness  -           User Key   (r) = Recorded By, (t) = Taken By, (c) = Cosigned By    Initials Name Provider Type    HC Armida Joseph, PT Physical Therapist               Mobility     Row Name 06/10/22 1243          Bed Mobility    Bed Mobility supine-sit  -HC     Supine-Sit [place] (Bed Mobility) independent  -HC     Row Name 06/10/22 1243          Sit-Stand Transfer    Sit-Stand [place] (Transfers) independent  -     Row Name 06/10/22 1243          Gait/Stairs (Locomotion)    [place] Level (Gait) supervision  -     Distance in Feet (Gait) 450 ft  -HC     Deviations/Abnormal Patterns (Gait) gait speed decreased  -HC     Comment, (Gait/Stairs) one mild episode of lateral sway, but not signs of LOB or safety concerns noted  -           User Key  (r) = Recorded By, (t) = Taken By, (c) = Cosigned By    Initials Name Provider Type     Armida Joseph, PT Physical Therapist               Obj/Interventions     Row Name 06/10/22 1243          Range of Motion Comprehensive    General Range of Motion no range of motion deficits identified  -[facility] Name 06/10/22 1243          Strength Comprehensive (MMT)    General Manual Muscle Testing (MMT) Assessment no strength deficits identified  -     Row Name 06/10/22 1243          Motor Skills    Motor Skills coordination  -     Coordination WFL  -     Row Name 06/10/22 1243          Balance    Balance Assessment sitting static balance;sitting dynamic balance;standing static balance;standing dynamic balance  -     Static Sitting Balance independent  -     Dynamic Sitting Balance independent  -     Static Standing Balance independent  -     Dynamic Standing Balance supervision  -     Row Name 06/10/22 1243          Sensory Assessment (Somatosensory)    Sensory Assessment (Somatosensory) --  Pt reporting decreased sensation right side of face/head, right UE and right LE; however able to feel light touch during sensation testing  -           User Key  (r) =  Recorded By, (t) = Taken By, (c) = Cosigned By    Initials Name Provider Type     Armida Joseph, PT Physical Therapist               Goals/Plan    No documentation.                Clinical Impression     Row Name 06/10/22 1246          Pain    Pretreatment Pain Rating 0/10 - no pain  -HC     Posttreatment Pain Rating 0/10 - no pain  -     Row Name 06/10/22 1246          Plan of Care Review    Outcome Evaluation Pt is 58 yo male admitted for right side numbness. Pt s/p tPA on 6/9/22.  MRI brain shwoing acute/subacute infarct within left thalamus.  Repeat CT scan showing evolving infarct left thalamus.  -     Row Name 06/10/22 1246          Therapy Assessment/Plan (PT)    Patient/Family Therapy Goals Statement (PT) return home with wife  -     Criteria for Skilled Interventions Met (PT) no problems identified which require skilled intervention  -     Therapy Frequency (PT) evaluation only  -     Row Name 06/10/22 1246          Vital Signs    Pre Systolic BP Rehab 144  -HC     Pre Treatment Diastolic BP 93  -HC     Post Systolic BP Rehab 146  -HC     Post Treatment Diastolic BP 86  -HC     Pre Patient Position Supine  -HC     Post Patient Position Sitting  -     Row Name 06/10/22 1246          Positioning and Restraints    Pre-Treatment Position in bed  -HC     Post Treatment Position chair  -HC     In Chair notified nsg;call light within reach  wife present  -           User Key  (r) = Recorded By, (t) = Taken By, (c) = Cosigned By    Initials Name Provider Type     Armida Joseph, PT Physical Therapist               Outcome Measures     Row Name 06/10/22 1252          How much help from another person do you currently need...    Turning from your back to your side while in flat bed without using bedrails? 4  -HC     Moving from lying on back to sitting on the side of a flat bed without bedrails? 4  -HC     Moving to and from a bed to a chair (including a wheelchair)? 4  -HC     Standing up  from a chair using your arms (e.g., wheelchair, bedside chair)? 4  -HC     Climbing 3-5 steps with a railing? 3  -HC     To walk in hospital room? 3  -HC     AM-PAC 6 Clicks Score (PT) 22  -HC     Highest level of mobility 7 --> Walked 25 feet or more  -     Row Name 06/10/22 1252          Modified Oralia Scale    Modified Oralia Scale 2 - Slight disability.  Unable to carry out all previous activities but able to look after own affairs without assistance.  -     Row Name 06/10/22 1252 06/10/22 1058       Functional Assessment    Outcome Measure Options AM-PAC 6 Clicks Basic Mobility (PT);Modified Oralia  -HC AM-PAC 6 Clicks Daily Activity (OT)  -ES          User Key  (r) = Recorded By, (t) = Taken By, (c) = Cosigned By    Initials Name Provider Type    ES Solange Baxter, OT Occupational Therapist     Armida Joseph, PT Physical Therapist                             Physical Therapy Education                 Title: PT OT SLP Therapies (In Progress)     Topic: Physical Therapy (Done)     Point: Mobility training (Done)     Learning Progress Summary           Patient Acceptance, E, VU by  at 6/10/2022 1252                   Point: Precautions (Done)     Learning Progress Summary           Patient Acceptance, E, VU by  at 6/10/2022 1252                               User Key     Initials Effective Dates Name Provider Type [facility] 06/16/21 -  Armida Joseph, PT Physical Therapist PT              PT Recommendation and Plan     Plan of Care Reviewed With: patient  Outcome Evaluation: Pt is 58 yo male admitted for right side numbness. Pt s/p tPA on 6/9/22.  MRI brain shwoing acute/subacute infarct within left thalamus.  Repeat CT scan showing evolving infarct left thalamus.  Pt presents with continued impaired sensation right UE/LE and right side face/head.  Pt's strength, gross and fine motor coordination, cognition, and vision all WFL.  Pt educated on CVA signs/symptoms and provided written  handout on BE FAST and types of CVAs.  Pt able to perform bed mobility/transfers with indep and ambulate 450 ft with SUP.  Pt exhibiting one episode of lateral sway but no signs of LOB or safety concerns noted with functional mobility.  Pt has strong support of wife and family.  Pt can be up with nursing staff or wife.  No further inpatient PT needs.  Plan home with wife.     Time Calculation:    PT Charges     Row Name 06/10/22 1257             Time Calculation    Start Time 1049  -HC      Stop Time 1114  -      Time Calculation (min) 25 min  -HC      PT Received On 06/10/22  -              Time Calculation- PT    Total Timed Code Minutes- PT 0 minute(s)  -            User Key  (r) = Recorded By, (t) = Taken By, (c) = Cosigned By    Initials Name Provider Type    HC Armida Joseph, PT Physical Therapist              Therapy Charges for Today     Code Description Service Date Service Provider Modifiers Qty    [id number]  PT EVAL LOW COMPLEXITY 4 6/10/2022 Armida Joseph, PT GP 1          PT G-Codes  Outcome Measure Options: AM-PAC 6 Clicks Basic Mobility (PT), Modified Oralia  AM-PAC 6 Clicks Score (PT): 22  AM-PAC 6 Clicks Score (OT): 24  Modified [place] Scale: 2 - Slight disability.  Unable to carry out all previous activities but able to look after own affairs without assistance.    Armida Joseph PT  6/10/2022

## 2022-06-10 NOTE — CONSULTS
[facility] Medicine Services - Consult Note    Patient Name: Glenroy Penaloza  : 1965  MRN: [id number]  Primary Care Physician:  Rosie Vela APRN  Referring Physician: JANETT Juan  Date of admission: 2022    Consults    Subjective      Reason for Consult/ Chief Complaint: right sided numbess    History of Present Illness: Glenroy Penaloza is a 57 y.o. male with PMH of HTN, HLD, DM type II who presented to [facility] ED 2022 with complaints of right sided numbness to his face, right arm, and right leg that started approximately 10 minutes prior to arrival. He denied any slurred speech. He felt like his extremities were asleep. He denied any chest pain.    In the ED the patient had CT head that showed no intracranial hemorrhage. CTA head/neck showed no large vessel occlusion or significant stenosis.  He was determined to be a tPA candidate and was given tPA. He continued to have right sided numbness. He was admitted to the ICU for close neurological monitoring.     MRI brain showed left thalamic infarct.     Date:     6/10/2022 CT head showed evolving small recent infarct within the left thalamus. No acute intracranial hemorrhage.   Aspirin 325mg started, atorvastatin 40mg nightly     Review of Systems   Constitutional: Negative.   HENT: Negative.    Eyes: Negative.    Cardiovascular: Negative.    Respiratory: Negative.    Endocrine: Negative.    Hematologic/Lymphatic: Negative.    Skin: Negative.    Musculoskeletal: Negative.    Gastrointestinal: Negative.    Genitourinary: Negative.    Neurological: Positive for numbness.   Psychiatric/Behavioral: Negative.    Allergic/Immunologic: Negative.    All other systems reviewed and are negative.       Personal History     Past Medical History:   Diagnosis Date   • Atypical rash    • Diabetes mellitus, type 2 (HCC)    • Disorder of male genital organs    • Elevated glucose    • Essential hypertension 2020   • Glucosuria    •  Hyperlipidemia        Past Surgical History:   Procedure Laterality Date   • COLONOSCOPY N/A 4/4/2022    Procedure: COLONOSCOPY WITH POLYPECTOMY;  Surgeon: Arsen Payan MD;  Location: [facility] ENDOSCOPY;  Service: Gastroenterology;  Laterality: N/A;  COLON POLYP, HEMORRHOIDS       Family History: family history includes Cancer in his father, maternal uncle, mother, and another family member. Otherwise pertinent FHx was reviewed and not pertinent to current issue.    Social History:  reports that he has never smoked. He quit smokeless tobacco use about 10 months ago. He reports current alcohol use. He reports that he does not use drugs.    Home Medications:   lisinopril-hydrochlorothiazide, lovastatin, and metFORMIN ER    Allergies:  No Known Allergies      Objective      Vitals:  Temp:  [97.4 °F (36.3 °C)-98.6 °F (37 °C)] 97.7 °F (36.5 °C)  Heart Rate:  [50-97] 68  Resp:  [12-19] 19  BP: (122-164)/() 146/80    Physical Exam  Vitals and nursing note reviewed.   HENT:      Head: Normocephalic and atraumatic.   Eyes:      Extraocular Movements: Extraocular movements intact.      Pupils: Pupils are equal, round, and reactive to light.   Cardiovascular:      Rate and Rhythm: Normal rate and regular rhythm.      Pulses: Normal pulses.      Heart sounds: Normal heart sounds.   Pulmonary:      Effort: Pulmonary effort is normal.      Breath sounds: Normal breath sounds.   Abdominal:      General: Bowel sounds are normal.      Palpations: Abdomen is soft.      Tenderness: There is no abdominal tenderness.   Musculoskeletal:         General: Normal range of motion.   Skin:     General: Skin is warm and dry.   Neurological:      Mental Status: He is alert and oriented to person, place, and time.      Comments: Right sided face, arm, and leg still with numbness  Normal ROM, no facial droop, no slurred speech   Psychiatric:         Mood and Affect: Mood normal.         Behavior: Behavior normal.         Result Review     Result Review:  I have personally reviewed the results from the time of this admission to 6/10/2022 08:50 EDT and agree with these findings:  [x]  Laboratory  []  Microbiology  [x]  Radiology  []  EKG/Telemetry   []  Cardiology/Vascular   []  Pathology  [x]  Old records      Assessment & Plan        Active Hospital Problems:  Active Hospital Problems    Diagnosis    • **Acute ischemic thalamic stroke, left (HCC)    • Numbness and tingling of right upper and lower extremity    • Hyperlipidemia    • Diabetes mellitus, type 2 (HCC)    • Essential hypertension      Plan:     Acute left ischemic thalamic stroke   -right sided numbness symptoms continue  -CT head 6/9/2022: no acute intracranial hemorrhage  -CTA head/neck: no large vessel occlusion or significant stenosis  -MRI brain: left thalamic infarct  -CT head 6/10/2022: Evolving small recent infarct within the left thalamus.  No acute intracranial hemorrhage.  -Started on 325 mg aspirin, 40 mg Lipitor  -neurology following     Hypertension  -Controlled.  Continue home lisinopril/HCTZ    Hyperlipidemia  -Statin as above    Diabetes mellitus type 2  -SSI AC&HS  -hgb a1c 9.5%    VTE prophylaxis - SCDs      This patient has been examined wearing appropriate Personal Protective Equipment    Signature: Electronically signed by JANETT Allen, 06/10/22, 10:42 AM EDT.

## 2022-06-10 NOTE — THERAPY EVALUATION
Acute Care - Speech Language Pathology Initial Evaluation   [place]     Patient Name: Glenroy Penaloza  : 1965  MRN: [id number]  Today's Date: 6/10/2022               Admit Date: 2022     Visit Dx:    ICD-10-CM ICD-9-CM   1. Acute CVA (cerebrovascular accident) (HCC)  I63.9 434.91     Patient Active Problem List   Diagnosis   • Diabetes mellitus, type 2 (HCC)   • Essential hypertension   • Hyperlipidemia   • Hypermetropia   • Encounter for screening for malignant neoplasm of colon   • Numbness and tingling of right upper and lower extremity   • Acute ischemic thalamic stroke, left (HCC)     Past Medical History:   Diagnosis Date   • Atypical rash    • Diabetes mellitus, type 2 (HCC)    • Disorder of male genital organs    • Elevated glucose    • Essential hypertension 2020   • Glucosuria    • Hyperlipidemia      Past Surgical History:   Procedure Laterality Date   • COLONOSCOPY N/A 2022    Procedure: COLONOSCOPY WITH POLYPECTOMY;  Surgeon: Arsen Payan MD;  Location: [facility] ENDOSCOPY;  Service: Gastroenterology;  Laterality: N/A;  COLON POLYP, HEMORRHOIDS       SLP Recommendation and Plan  SLP Diagnosis: Cognition at baseline     Discussed SLP scope of practice, availability for in depth workup if difficulties arise. No additional SLP services appear indicated at this time, pt cognition at baseline.   Will sign off. Please re-consult with changes in condition, or if additional workup is indicated. (06/10/22 1131)      SLP EVALUATION (last 72 hours)     SLP SLC Evaluation     Row Name 06/10/22 1100       Communication Assessment/Intervention    Document Type evaluation  -AB    Subjective Information no complaints  -AB    Patient Observations alert;cooperative;agree to therapy  -AB    Patient/Family/Caregiver Comments/Observations Pt seen in ICU room 2307, cooperative and pleasant  -AB    Patient Effort excellent  -AB    Symptoms Noted During/After Treatment none  -AB            General  Information    Patient Profile Reviewed yes  -AB    Pertinent History Of Current Problem 57-year-old male presented with acute CVA new onset right-sided numbness occurred 10 minutes prior to arrival while he was driving     Past medical history of hypertension, hyperlipidemia, diabetes mellitus  On evaluation by the ER symptoms appear to be worsening CT head showed no intracranial hemorrhage mild white matter findings suggestive of chronic microvascular disease, CT angiogram head no significant stenosis patent vertebral arteries no large vessel occlusion with patent carotid arteries     Neurology was consulted tPA was given at 9:08 AM. 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 Spouse and x7 children for immediate support  -AB    Prior Level of Function-Communication WFL  -AB    Plans/Goals Discussed with patient;agreed upon  -AB    Barriers to Rehab none identified  -AB    Patient's Goals for Discharge --  no concerns voiced\  -AB            Motor Speech Assessment/Intervention    Motor Speech Function WNL  100% intelligible at conversational level  -AB            Cognitive Assessment Intervention- SLP    Cognitive Function (Cognition) WNL  -AB    Orientation Status (Cognition) WNL  -AB    Memory (Cognitive) WNL;delayed;short-term  -AB    Attention (Cognitive) WNL  -AB    Problem Solving (Cognitive) WNL  -AB    Cognition, Comment Cognitive linguistic evaluation completed with utilization of informal means. New L thalamic infarct, pt primary complaints of R sensory loss and drift. Pt continues work as , driving, IADLs.   Problem solving for occupational and home based scenarios 100% I’;y. Memory with 100% 3/3 item recall with 10 min delay, paragraph comprehension 100% 5 minute delay.  Judgement, deficit awareness, insight intact given immediate medical hx.   Discussed SLP scope of practice, availability for in depth workup if difficulties arise. No additional SLP services appear indicated at this time, pt cognition at baseline.   Will sign off. Please re-consult with changes in condition, or if additional workup is indicated.  -AB            SLP Evaluation Clinical Impressions    SLP Diagnosis Cognition at baseline  -AB    Rehab Potential/Prognosis excellent  -AB    SLC Criteria for Skilled Therapy Interventions Met yes  -AB    Functional Impact no impact on function  -AB            SLP Treatment Clinical Impressions    Care Plan Review evaluation/treatment results reviewed;care plan/treatment goals reviewed;risks/benefits reviewed;current/potential barriers reviewed;patient/other agree to care plan  -AB            Recommendations    Therapy Frequency (SLP SLC) PRN  -AB    Predicted Duration Therapy Intervention (Days) until discharge  -AB    Anticipated Discharge Disposition (SLP) home with OP services  -AB            SLP Discharge Summary    Discharge Destination home w/ OP services  -AB    Progress Toward Achieving Short/long Term Goals discharge on same date as initial evaluation  -AB          User Key  (r) = Recorded By, (t) = Taken By, (c) = Cosigned By    Initials Name Effective Dates    AB Allie Vargas, MS CCC-SLP 08/01/21 -                    EDUCATION  The patient has been educated in the following areas:     Cognitive Impairment.                      Time Calculation:         Therapy Charges for Today     Code Description Service Date Service Provider Modifiers Qty    16979339394 HC ST EVAL SPEECH AND PROD W LANG  4 6/10/2022 Allie Vargas, MS CCC-SLP GN 1                 PPE  Patient was not wearing a face mask during this therapy encounter. Therapist used appropriate personal protective equipment including mask, eye protection and gloves.  Mask used was standard procedure  mask. Appropriate PPE was worn during the entire therapy session. The patient did not cough during this evaluation. Therapist was within 6 feet for 15 minutes or more during the evaluation. Hand hygiene was completed before and after therapy session. Patient is not in enhanced droplet precautions.         Allie Vargas MS CCC-SLP  6/10/2022

## 2022-06-10 NOTE — DISCHARGE SUMMARY
[facility]  DISCHARGE SUMMARY    Patient Name: Glenroy Penaloza  : 1965  MRN: [id number]    Date of Admission: 2022  Discharge Diagnosis: Left thalamic ischemic stroke status post tPA  Date of Discharge: 6/10/2020  Primary Care Physician: Rosie Vela APRN      Presenting Problem:   Acute CVA (cerebrovascular accident) (HCC) [I63.9]  Numbness and tingling of right upper and lower extremity [R20.0, R20.2]    Active and Resolved Hospital Problems:  Active Hospital Problems    Diagnosis POA   • **Acute ischemic thalamic stroke, left (HCC) [I63.212, I63.22] Yes   • Numbness and tingling of right upper and lower extremity [R20.0, R20.2] Yes   • Hyperlipidemia [E78.5] Yes   • Diabetes mellitus, type 2 (HCC) [E11.9] Yes   • Essential hypertension [I10] Yes      Resolved Hospital Problems   No resolved problems to display.         Hospital Course     Hospital Course:    Glenroy Penaloza is a 57 y.o. male with PMH of HTN, HLD, DM type II who presented to [facility] ED 2022 with complaints of right sided numbness to his face, right arm, and right leg that started approximately 10 minutes prior to arrival. He denied any slurred speech. He felt like his extremities were asleep. He denied any chest pain.     In the ED the patient had CT head that showed no intracranial hemorrhage. CTA head/neck showed no large vessel occlusion or significant stenosis.  He was determined to be a tPA candidate and was given tPA. He continued to have right sided numbness. He was admitted to the ICU for close neurological monitoring.      MRI brain showed left thalamic infarct.  CTA of head and neck showing patent major vessels, mild bilateral atherosclerosis of carotid arteries.    In the ICU patient hemodynamically stable had regular neurochecks.  Patient repeat CT head after tPA showing no signs of intracranial hemorrhage.  At that time patient able to be started on antiplatelet agent.  Patient  continued on statin additionally.  Blood pressure controlled.  Patient evaluated by neurology, and patient noted to have continued right-sided numbness without progression.  Patient evaluated by PT, OT and speech therapy and cleared for discharge home.  Patient had echocardiogram showing signs of diastolic cardiomyopathy with negative bubble study.  Patient cleared by neurology for discharge home.  Follow-up organized with primary care and neurology.  Patient discharged home in good condition with strict return precautions given.      DISCHARGE Follow Up Recommendations for labs and diagnostics:       Reasons For Change In Medications and Indications for New Medications:    Aspirin 81 mg daily for stroke prevention  Lovastatin changed to atorvastatin 40 mg nightly for stroke prevention    Day of Discharge     Vital Signs:  Temp:  [96.4 °F (35.8 °C)-98.1 °F (36.7 °C)] 96.4 °F (35.8 °C)  Heart Rate:  [50-91] 68  Resp:  [15-19] 19  BP: (122-163)/(75-99) 146/80    Physical Exam:  Physical Exam     General: Obese middle-age male lying in bed breathing comfortably on room air no acute distress  HEENT: NC/AT, EOMI, PERRLA, mucosa moist  Heart: Regular, rate controlled  Chest:  normal work of breathing moving air well no wheezing  Abdominal: Soft.  Nontender nondistended  Musculoskeletal: Normal ROM.  No edema. No calf tenderness.  Neurological: AAOx3, right-sided upper and lower extremity sensation deficit musculoskeletal appropriate  Skin: Skin is warm and dry. No rash  Psychiatric: Normal mood and affect.        Pertinent  and/or Most Recent Results     LAB RESULTS:      Lab 06/10/22  0401 06/09/22  0803 06/09/22  0802   WBC 5.50 6.10  --    HEMOGLOBIN 14.4 15.5  --    HEMATOCRIT 40.4 44.5  --    PLATELETS 142 177  --    NEUTROS ABS 2.40 2.80  --    LYMPHS ABS 2.40 2.50  --    MONOS ABS 0.50 0.60  --    EOS ABS 0.20 0.20  --    MCV 89.0 89.4  --    PROTIME  --   --  9.8   APTT  --   --  24.9         Lab 06/10/22  0401  06/09/22  0802   SODIUM 136 138   POTASSIUM 4.1 3.9   CHLORIDE 102 100   CO2 27.0 24.0   ANION GAP 7.0 14.0   BUN 15 15   CREATININE 0.89 1.05   EGFR 99.3 82.3   GLUCOSE 221* 230*   CALCIUM 8.5* 9.9   MAGNESIUM 1.7  --    PHOSPHORUS 2.9  --    HEMOGLOBIN A1C 9.5*  --    TSH  --  2.030         Lab 06/10/22  0401 06/09/22  0802   TOTAL PROTEIN 6.1 7.2   ALBUMIN 3.60 4.50   GLOBULIN 2.5 2.7   ALT (SGPT) 55* 73*   AST (SGOT) 48* 57*   BILIRUBIN 0.5 0.4   ALK PHOS 47 57         Lab 06/09/22  0802   TROPONIN T <0.010   PROTIME 9.8   INR 0.95         Lab 06/10/22  0401   CHOLESTEROL 182   LDL CHOL 103*   HDL CHOL 43   TRIGLYCERIDES 206*         Lab 06/09/22  0802   FOLATE >20.00   VITAMIN B 12 532   ABO TYPING AB   RH TYPING Positive   ANTIBODY SCREEN Negative         Brief Urine Lab Results  (Last result in the past 365 days)      Color   Clarity   Blood   Leuk Est   Nitrite   Protein   CREAT   Urine HCG        01/22/22 1147 Dark Yellow   Cloudy   Negative   Negative   Negative   30 mg/dL (1+)               Microbiology Results (last 10 days)     Procedure Component Value - Date/Time    COVID PRE-OP / PRE-PROCEDURE SCREENING ORDER (NO ISOLATION) - Swab, Nasopharynx [[id number]]  (Normal) Collected: 06/09/22 0906    Lab Status: Final result Specimen: Swab from Nasopharynx Updated: 06/09/22 0955    Narrative:      The following orders were created for panel order COVID PRE-OP / PRE-PROCEDURE SCREENING ORDER (NO ISOLATION) - Swab, Nasopharynx.  Procedure                               Abnormality         Status                     ---------                               -----------         ------                     COVID-19,CEPHEID/BIJAL,CO...[[id number]]  Normal              Final result                 Please view results for these tests on the individual orders.    COVID-19,CEPHEID/BIJAL,COR/[facility]/PAD/BREANNE IN-HOUSE(OR EMERGENT/ADD-ON),NP SWAB IN TRANSPORT MEDIA 3-4 HR TAT, RT-PCR - Swab, Nasopharynx [[id number]]  (Normal)  Collected: 06/09/22 0906    Lab Status: Final result Specimen: Swab from Nasopharynx Updated: 06/09/22 0955     COVID19 Not Detected    Narrative:      Fact sheet for providers: https://www.fda.gov/media/681091/download     Fact sheet for patients: https://www.fda.gov/media/245630/download  Fact sheet for providers: https://www.fda.gov/media/703321/download    Fact sheet for patients: https://www.fda.gov/media/179958/download    Test performed by PCR.          CT Head Without Contrast    Result Date: 6/10/2022  Impression: 1.  Evolving small recent infarct within the left thalamus. 2.  No acute intracranial hemorrhage. Electronically signed by:  Gordo Hallman DO  6/10/2022 5:21 AM    CT Angiogram Neck    Result Date: 6/9/2022  Impression: 1. Patent major intracranial vasculature without findings of large vessel occlusion. 2. Patent carotid arteries with mild atherosclerotic calcification. No significant stenosis by NASCET criteria. 2. Patent vertebral arteries. 3. Incidental CT findings above.  I discussed the findings with Dr. Chatman at 8:12 AM on 06/09/2022.  Electronically Signed By-Christofer Rosario MD On:6/9/2022 8:28 AM This report was finalized on [id number] by  Christofer Rosario MD.    MRI Brain Without Contrast    Result Date: 6/9/2022  Impression: FINDINGS consistent with 9 x 3 mm acute or subacute infarct within the left thalamus.   Electronically Signed By-Ailyn Farfan MD On:6/9/2022 4:43 PM This report was finalized on [id number] by  Ailyn Farfan MD.    XR Chest 1 View    Result Date: 6/9/2022  Impression: No acute chest findings.  Electronically Signed By-Ailyn Farfan MD On:6/9/2022 8:31 AM This report was finalized on [id number] by  Ailyn Farfan MD.    CT Head Without Contrast Stroke Protocol    Result Date: 6/9/2022  Impression: IMPRESSION : 1. No intracranial hemorrhage. 2. Mild white matter findings most compatible with chronic microvascular disease.  Electronically Signed By-Christofer Rosario  MD On:6/9/2022 8:08 AM This report was finalized on [id number] by  Christofer Rosario MD.    CT Angiogram Head w AI Analysis of LVO    Result Date: 6/9/2022  Impression: 1. Patent major intracranial vasculature without findings of large vessel occlusion. 2. Patent carotid arteries with mild atherosclerotic calcification. No significant stenosis by NASCET criteria. 2. Patent vertebral arteries. 3. Incidental CT findings above.  I discussed the findings with Dr. Chatman at 8:12 AM on 06/09/2022.  Electronically Signed By-Christofer Rosario MD On:6/9/2022 8:28 AM This report was finalized on [id number] by  Christofer Rosario MD.              Results for orders placed during the hospital encounter of 06/09/22    Adult Transthoracic Echo Complete W/ Cont if Necessary Per Protocol    Interpretation Summary  · Left ventricular systolic function is normal.  · Left ventricular ejection fraction is 55 to 60%.  · Left ventricular diastolic function is consistent with (grade I) impaired relaxation.  · Saline test results are negative.      Labs Pending at Discharge:      Procedures Performed           Consults:   Consults     Date and Time Order Name Status Description    6/10/2022 10:43 AM Inpatient Endocrinology Consult      6/10/2022  7:58 AM Inpatient Hospitalist Consult      6/9/2022  9:33 AM Inpatient Neurology Consult Stroke Completed     6/9/2022  8:43 AM Intensivist (on-call MD unless specified)              Discharge Details        Discharge Medications      New Medications      Instructions Start Date   aspirin 81 MG EC tablet   81 mg, Oral, Daily      atorvastatin 40 MG tablet  Commonly known as: LIPITOR   40 mg, Oral, Nightly      glucose blood test strip   1 each, Other, Daily, Use as instructed Dx code: E11.65      Microlet Lancets misc   1 each, Does not apply, Daily, Dx code: E11.65         Continue These Medications      Instructions Start Date   lisinopril-hydrochlorothiazide 10-12.5 MG per tablet  Commonly known as:  PRINZIDE,ZESTORETIC   1 tablet, Oral, Daily      metFORMIN  MG 24 hr tablet  Commonly known as: GLUCOPHAGE-XR   1,000 mg, Oral, Daily With Breakfast      metFORMIN  MG 24 hr tablet  Commonly known as: GLUCOPHAGE-XR   500 mg, Oral, Daily With Dinner         Stop These Medications    lovastatin 40 MG tablet  Commonly known as: MEVACOR            No Known Allergies      Discharge Disposition: Good  Home or Self Care    Diet:  Hospital:  Diet Order   Procedures   • Diet Regular, Diabetic/Consistent Carbs; Diabetic - Consistent Carb         Discharge Activity:   Activity Instructions     Activity as Tolerated              CODE STATUS:  Code Status and Medical Interventions:   Ordered at: 06/09/22 0933     Code Status (Patient has no pulse and is not breathing):    CPR (Attempt to Resuscitate)     Medical Interventions (Patient has pulse or is breathing):    Full Support         Future Appointments   Date Time Provider Department Center   6/27/2022  9:00 AM Milagros Barr APRN [facility]       Additional Instructions for the Follow-ups that You Need to Schedule     Discharge Follow-up with PCP   As directed       Currently Documented PCP:    Rosie Vela APRN    PCP Phone Number:    [phone]     Follow Up Details: Please follow-up with your primary care doctor within a week         Discharge Follow-up with Specified Provider: Please follow-up with your new neurologist in 4 weeks continue monitoring stroke symptoms   As directed      To: Please follow-up with your new neurologist in 4 weeks continue monitoring stroke symptoms               Time spent on Discharge including face to face service:  35 minutes    This patient has been examined wearing appropriate Personal Protective Equipment and discussed with hospital infection control department. 06/10/22      Signature: Electronically signed by Nicanor Phipps MD, 06/10/22, 1:43 PM EDT.

## 2022-06-10 NOTE — CONSULTS
"Diabetes Education  Assessment/Teaching    Patient Name:  Glenroy Penaloza  YOB: 1965  MRN: [id number]  Admit Date:  6/9/2022      Assessment Date:  6/10/2022  Flowsheet Row Most Recent Value   General Information     Referral From: A1c, Blood glucose, MD order  [MD consult per stroke protocol, admission blood sugar 208, and on 6/10/2022 A1c was 9.5%.]   Height 172.7 cm (68\")   Weight 95.7 kg (210 lb 15.7 oz)   Weight Method Bed scale   Pregnancy Assessment    Diabetes History    What type of diabetes do you have? Type 2   Length of Diabetes Diagnosis 1 - 5 years  [Patient stated he was diagnosed about 5 years ago.]   Current DM knowledge fair   Do you test your blood sugar at home? no   Have you had high blood sugar? (>140mg/dl) yes   How often do you have high blood sugar? unknown   When was your last high blood sugar? Admission blood sugar 208   Education Preferences    What areas of diabetes would you like to learn about? avoiding high blood sugar, diabetes complications, diet information, testing my blood sugar at home   Nutrition Information    Assessment Topics    Healthy Eating - Assessment Needs education   Problem Solving - Assessment Needs education   Reducing Risk - Assessment Needs education   Monitoring - Assessment Needs education   DM Goals    Healthy Eating - Goal Today   Problem Solving - Goal Today   Reducing Risk - Goal Today   Monitoring - Goal Today          Flowsheet Row Most Recent Value   DM Education Needs    Meter Meter provided   Meter Type Contour  [Contour Next One glucometer given to patient with patient doing return demonstration using the lancing device, inserting the test strip into the meter, and applying blood to the test strip. Blood sugar was 212.]   Frequency of Testing Daily  [Patient plans on downloading [place] on phone. Discussed with patient about checking blood sugar daily varying the times before meals and at bedtime.]   Medication Oral  [At home patient stated " that he takes Metfomin 500 mg 2 tabs in the morning and 1 tab in the evening.]   Problem Solving Hyperglycemia, Signs, Symptoms, Treatment   Reducing Risks A1C testing  [On 6/10/2022 A1c was 9.5%.]   Healthy Eating Basic meal plan provided   Discharge Plan Home   Motivation Moderate   Teaching Method Explanation, Discussion, Demonstration, Handouts, Teach back   Patient Response Demonstrates adequately, Verbalized understanding  [Wife at bedside and involved with discussion.]            Other Comments:  A1c info sheet given with discussion on A1c target and healthy blood sugar range. Patent stated he drinks 1 regular soda a day but if he eats out he drinks more with refills. Discussed with patient about drinking unsweetened beverages and patient stated he could do that. Handouts given with discussion on 1 serving of carbs being = 15 grams, being allowed 4 servings  of carbs per meal, counting carbs, reading food labels, low carb snacks, and meal planning. Explained to patient about blood flow and how higher blood sugars put him at higher risk for stroke or heart attack. Patient admitted with a stroke.  Prescriptions started in discharge orders for lancets, test strips, and alcohol wipes. Patient and wife have no further questions or concerns related to diabetes at this time.          Electronically signed by:  Mariza Lomas RN  06/10/22 16:16 EDT

## 2022-06-10 NOTE — PAYOR COMM NOTE
"UTILIZATION REVIEW  BO ROMERO RN   PH:   FAX: [phone]    [facility]  NPI# [id number]  TID # [id number]  ===========================    Inpatient authorization request  6/9/2022- admitted to ICU    =============================            Renée Starr (57 y.o. Male)             Date of Birth   1965    Social Security Number       Address   [address] [place] [address]    Home Phone   [phone]    MRN   [id number]       Muslim   [employer]    Marital Status                               Admission Date   6/9/22    Admission Type   Emergency    Admitting Provider   Paige Merlos MD    Attending Provider   Nicanor Phipps MD    Department, Room/Bed   [facility] INTENSIVE CARE UNIT, 2307/1       Discharge Date       Discharge Disposition       Discharge Destination                               Attending Provider: Nicanor Phipps MD    Allergies: No Known Allergies    Isolation: None   Infection: None   Code Status: CPR   Advance Care Planning Activity    Ht: 172.7 cm (68\")   Wt: 95.7 kg (210 lb 15.7 oz)    Admission Cmt: None   Principal Problem: Acute ischemic thalamic stroke, left (HCC) [I63.212,I63.22]                 Active Insurance as of 6/9/2022     Primary Coverage     Payor Plan Insurance Group Employer/Plan Group    [employer] BLUE CROSS [employer] BLUE CROSS BLUE SHIELD PPO [id number]     Payor Plan Address Payor Plan Phone Number Payor Plan Fax Number Effective Dates    [address] [phone]  2/1/2018 - None Entered    [place] [id number]       Subscriber Name Subscriber Birth Date Member ID       RENÉE STARR 1965 [id number]                 Emergency Contacts      (Rel.) Home Phone Work Phone Mobile Phone    Amelia STARR (Spouse) [phone] -- [phone]               History & Physical      Paige Merlos MD at 06/09/22 1159              Patient Care Team:  Rosie Vela APRN as PCP - General (Nurse " Practitioner)    Chief complaint CVA        Assessment & Plan     57-year-old male presented with acute CVA new onset right-sided numbness occurred 10 minutes prior to arrival while he was driving    Past medical history of hypertension, hyperlipidemia, diabetes mellitus  On evaluation by the ER symptoms appear to be worsening CT head showed no intracranial hemorrhage mild white matter findings suggestive of chronic microvascular disease, CT angiogram head no significant stenosis patent vertebral arteries no large vessel occlusion with patent carotid arteries    Neurology was consulted tPA was given at 9:08 AM    Plan  ICU care for neurochecks  MRI later today  CT scan after 24-hour  Aspirin after 24 hours  Blood pressure control  Glycemic control  IV fluid normal saline 75 cc an hour      History    57-year-old male presented with acute CVA new onset right-sided numbness occurred 10 minutes prior to arrival while he was driving    Past medical history of hypertension, hyperlipidemia, diabetes mellitus  On evaluation by the ER symptoms appear to be worsening CT head showed no intracranial hemorrhage mild white matter findings suggestive of chronic microvascular disease, CT angiogram head no significant stenosis patent vertebral arteries no large vessel occlusion with patent carotid arteries    Neurology was consulted tPA was given at 9:08 AM      Numbness and tingling of right upper and lower extremity      Past Medical History:   Diagnosis Date   • Atypical rash    • Diabetes mellitus, type 2 (HCC)    • Disorder of male genital organs    • Elevated glucose    • Essential hypertension 07/30/2020   • Glucosuria    • Hyperlipidemia        Past Surgical History:   Procedure Laterality Date   • COLONOSCOPY N/A 4/4/2022    Procedure: COLONOSCOPY WITH POLYPECTOMY;  Surgeon: Arsen Payan MD;  Location: [facility] ENDOSCOPY;  Service: Gastroenterology;  Laterality: N/A;  COLON POLYP, HEMORRHOIDS       Family History    Problem Relation Age of Onset   • Cancer Mother    • Cancer Father    • Cancer Maternal Uncle    • Cancer Other    • Colon cancer Neg Hx    • Malig Hyperthermia Neg Hx        Social History     Socioeconomic History   • Marital status:    Tobacco Use   • Smoking status: Never Smoker   • Smokeless tobacco: Former User     Quit date: 8/1/2021   Vaping Use   • Vaping Use: Never used   Substance and Sexual Activity   • Alcohol use: Yes     Comment: current some day    • Drug use: Never       Review of Systems  Review of Systems   Neurological: Positive for syncope and numbness. Negative for seizures and speech difficulty.        Vital Signs  Temp:  [97.8 °F (36.6 °C)-98.6 °F (37 °C)] 98.6 °F (37 °C)  Heart Rate:  [65-97] 65  Resp:  [12-18] 14  BP: (142-196)/() 158/103    Physical Exam:  Physical Exam      Radiology  Imaging Results (Last 24 Hours)     Procedure Component Value Units Date/Time    CT Angiogram Head w AI Analysis of LVO [[id number]] Collected: 06/09/22 0815     Updated: 06/09/22 0910    Narrative:         DATE OF EXAM:  6/9/2022 8:01 AM     PROCEDURE:  CT ANGIOGRAM HEAD W AI ANALYSIS OF LVO-, CT ANGIOGRAM NECK-     INDICATIONS:   Acute Stroke     COMPARISON:   Atherosclerotic heart disease     TECHNIQUE:  CTA of the head and CTA of the neck was performed after the intravenous  administration of Isovue 370. Reconstructed coronal and sagittal images  were also obtained. In addition, a 3 D volume rendered image was  obtained after post processing. Automated exposure control and iterative  reconstruction methods were used. AI analysis of LVO was utilized for  the CTA Head imaging portion of the study.      FINDINGS:  VASCULAR FINDINGS:   Thoracic aortic branch vasculature is normally opacified with contrast.  The bilateral vertebral arteries are patent. The left vertebral artery  is dominant.      The left common carotid artery, internal carotid artery, and external  carotid arteries are patent.  There is mild calcified atherosclerotic  plaque at the left carotid bifurcation without significant stenosis.      The right common carotid artery, internal carotid artery, and external  carotid arteries are patent. There is mild calcified atherosclerotic  plaque at the right carotid bifurcation without significant stenosis.     The distal right and left ICA are patent to the [place]. Both  anterior cerebral arteries are patent. The proximal middle cerebral  arteries are patent and the M1 and visualized M2 segments. There is no  central large vessel occlusion. The basilar artery is patent. Both  posterior cerebral arteries are patent. Superior cerebellar arteries are  patent. There is no significant intracranial aneurysm identified. The  intracranial venous vasculature appears normally opacified.     NONVASCULAR FINDINGS:   Upper chest demonstrates cardiomegaly with coronary artery  calcifications. There is no central pulmonary embolus. Lung apices are  without consolidation. Parotid and sublingual glands without acute  abnormality. No adenopathy in the neck.  Parapharyngeal soft tissues  appear unremarkable. Glottic and supraglottic soft tissues appear within  normal limits. The mastoid air cells are well-aerated. Mild sinus  thickening at the bases of the left and right maxillary sinus. There is  no sinus fluid level. Mandible intact. Slight rightward deviation of the  nasal septum. Cervical vertebral bodies maintained in height. Negative  for acute osseous abnormality.     There is no intracranial hemorrhage. No midline shift or mass effect.  Ventricles and cortical sulci are normally configured. No abnormal  extra-axial fluid collection. Globes symmetric. No retro-orbital  abnormality.       Impression:      1. Patent major intracranial vasculature without findings of large  vessel occlusion.  2. Patent carotid arteries with mild atherosclerotic calcification. No  significant stenosis by NASCET  criteria.  2. Patent vertebral arteries.  3. Incidental CT findings above.     I discussed the findings with Dr. Chatman at 8:12 AM on 06/09/2022.     Electronically Signed By-Christofer Rosario MD On:6/9/2022 8:28 AM  This report was finalized on [id number] by  Christofer Rosario MD.    CT Angiogram Neck [[id number]] Collected: 06/09/22 0815     Updated: 06/09/22 0910    Narrative:         DATE OF EXAM:  6/9/2022 8:01 AM     PROCEDURE:  CT ANGIOGRAM HEAD W AI ANALYSIS OF LVO-, CT ANGIOGRAM NECK-     INDICATIONS:   Acute Stroke     COMPARISON:   Atherosclerotic heart disease     TECHNIQUE:  CTA of the head and CTA of the neck was performed after the intravenous  administration of Isovue 370. Reconstructed coronal and sagittal images  were also obtained. In addition, a 3 D volume rendered image was  obtained after post processing. Automated exposure control and iterative  reconstruction methods were used. AI analysis of LVO was utilized for  the CTA Head imaging portion of the study.      FINDINGS:  VASCULAR FINDINGS:   Thoracic aortic branch vasculature is normally opacified with contrast.  The bilateral vertebral arteries are patent. The left vertebral artery  is dominant.      The left common carotid artery, internal carotid artery, and external  carotid arteries are patent. There is mild calcified atherosclerotic  plaque at the left carotid bifurcation without significant stenosis.      The right common carotid artery, internal carotid artery, and external  carotid arteries are patent. There is mild calcified atherosclerotic  plaque at the right carotid bifurcation without significant stenosis.     The distal right and left ICA are patent to the [place]. Both  anterior cerebral arteries are patent. The proximal middle cerebral  arteries are patent and the M1 and visualized M2 segments. There is no  central large vessel occlusion. The basilar artery is patent. Both  posterior cerebral arteries are patent. Superior  cerebellar arteries are  patent. There is no significant intracranial aneurysm identified. The  intracranial venous vasculature appears normally opacified.     NONVASCULAR FINDINGS:   Upper chest demonstrates cardiomegaly with coronary artery  calcifications. There is no central pulmonary embolus. Lung apices are  without consolidation. Parotid and sublingual glands without acute  abnormality. No adenopathy in the neck.  Parapharyngeal soft tissues  appear unremarkable. Glottic and supraglottic soft tissues appear within  normal limits. The mastoid air cells are well-aerated. Mild sinus  thickening at the bases of the left and right maxillary sinus. There is  no sinus fluid level. Mandible intact. Slight rightward deviation of the  nasal septum. Cervical vertebral bodies maintained in height. Negative  for acute osseous abnormality.     There is no intracranial hemorrhage. No midline shift or mass effect.  Ventricles and cortical sulci are normally configured. No abnormal  extra-axial fluid collection. Globes symmetric. No retro-orbital  abnormality.       Impression:      1. Patent major intracranial vasculature without findings of large  vessel occlusion.  2. Patent carotid arteries with mild atherosclerotic calcification. No  significant stenosis by NASCET criteria.  2. Patent vertebral arteries.  3. Incidental CT findings above.     I discussed the findings with Dr. Chatman at 8:12 AM on 06/09/2022.     Electronically Signed By-Christofer Rosario MD On:6/9/2022 8:28 AM  This report was finalized on [id number] by  Christofer Rosario MD.    XR Chest 1 View [[id number]] Collected: 06/09/22 0831     Updated: 06/09/22 0833    Narrative:      DATE OF EXAM:  6/9/2022 8:20 AM     PROCEDURE:  XR CHEST 1 VW-     INDICATIONS:  Acute Stroke Protocol (onset < 12 hrs)       COMPARISON:  None     TECHNIQUE:   Single radiographic view of the chest was obtained.     FINDINGS:  The lungs are clear. Heart size is normal. There is no pleural  effusion  or pneumothorax. There are no acute osseous abnormalities.       Impression:      No acute chest findings.     Electronically Signed By-Ailyn Farfan MD On:6/9/2022 8:31 AM  This report was finalized on [id number] by  Ailyn Farfan MD.    CT Head Without Contrast Stroke Protocol [[id number]] Collected: 06/09/22 0805     Updated: 06/09/22 0810    Narrative:         DATE OF EXAM:  6/9/2022 7:55 AM     PROCEDURE:   CT HEAD WO CONTRAST STROKE PROTOCOL-     INDICATIONS: Right-sided numbness, tingling     COMPARISON:   No Comparisons Available     TECHNIQUE:  Routine transaxial and coronal reconstruction images were obtained  through the head without the administration of contrast. Automated  exposure control and iterative reconstruction methods were used.     FINDINGS:  There is no intracranial hemorrhage. Negative for mass effect or midline  shift. The ventricles and cortical sulci are normally configured. There  are mild areas of subcortical white matter hypodensity suggesting  changes of chronic microvascular disease. Posterior fossa is without  acute abnormality. No abnormal extra-axial fluid collection. Globes  symmetric. No retro-orbital abnormality. The mastoid air cells are  well-aerated. No calvarial fracture.        Impression:      IMPRESSION :  1. No intracranial hemorrhage.  2. Mild white matter findings most compatible with chronic microvascular  disease.     Electronically Signed By-Christofer Rosario MD On:6/9/2022 8:08 AM  This report was finalized on [id number] by  Christofer Rosario MD.          Labs:  Results from last 7 days   Lab Units 06/09/22  0803   WBC 10*3/mm3 6.10   HEMOGLOBIN g/dL 15.5   HEMATOCRIT % 44.5   PLATELETS 10*3/mm3 177     Results from last 7 days   Lab Units 06/09/22  0802   SODIUM mmol/L 138   POTASSIUM mmol/L 3.9   CHLORIDE mmol/L 100   CO2 mmol/L 24.0   BUN mg/dL 15   CREATININE mg/dL 1.05   CALCIUM mg/dL 9.9   BILIRUBIN mg/dL 0.4   ALK PHOS U/L 57   ALT (SGPT) U/L 73*    AST (SGOT) U/L 57*   GLUCOSE mg/dL 230*         Results from last 7 days   Lab Units 06/09/22  0802   ALBUMIN g/dL 4.50     Results from last 7 days   Lab Units 06/09/22  0802   TROPONIN T ng/mL <0.010             Results from last 7 days   Lab Units 06/09/22  0802   INR  0.95   APTT seconds 24.9               Meds:   SCHEDULE  [START ON 6/10/2022] aspirin, 325 mg, Oral, Daily   Or  [START ON 6/10/2022] aspirin, 300 mg, Rectal, Daily  atorvastatin, 80 mg, Oral, Nightly  pantoprazole, 40 mg, Intravenous, Q AM  sodium chloride, 10 mL, Intravenous, Q12H  sodium chloride, 10 mL, Intravenous, Q12H      Infusions  sodium chloride, 75 mL/hr, Last Rate: 75 mL/hr (06/09/22 1029)      PRNs  •  acetaminophen **OR** acetaminophen  •  aluminum-magnesium hydroxide-simethicone  •  labetalol  •  nitroglycerin  •  ondansetron **OR** ondansetron  •  sodium chloride  •  sodium chloride  •  sodium chloride      I discussed the patients findings and my recommendations with patient and primary care team.     Paige Merlos MD  06/09/22  11:59 EDT    Time: Critical care 70 min    Electronically signed by Paige Merlos MD at 06/09/22 1206          Emergency Department Notes      Mehran Chatman MD at 06/09/22 0756          Subjective   History of Present Illness  History Provided By: Pt    Chief Complaint: Numbness on right face, right arm, right leg  Onset: 10 minutes prior to arrival  Timing: Worsening  Location: Right face, right arm, right leg  Quality: Decree sensation, not completely numb  Severity: Mild to moderate  Modifying Factors: None    Other: 57-year-old male with prior medical history of hypertension, hyperlipidemia, diabetes presents for new onset right-sided numbness.  Occurred approximately 10 minutes prior to arrival while he was driving.  Never had anything like this before.  No weakness.  Denies any issues walking.  No other recent symptoms.  Does not feel dizzy.    Review of Systems   Cardiovascular: Negative for chest  pain.   Neurological: Positive for numbness. Negative for dizziness, seizures, syncope, facial asymmetry, speech difficulty, weakness, light-headedness and headaches.   All other systems reviewed and are negative.      Past Medical History:   Diagnosis Date   • Atypical rash    • Diabetes mellitus, type 2 (HCC)    • Disorder of male genital organs    • Elevated glucose    • Essential hypertension 07/30/2020   • Glucosuria    • Hyperlipidemia        No Known Allergies    Past Surgical History:   Procedure Laterality Date   • COLONOSCOPY N/A 4/4/2022    Procedure: COLONOSCOPY WITH POLYPECTOMY;  Surgeon: Arsen Payan MD;  Location: [facility] ENDOSCOPY;  Service: Gastroenterology;  Laterality: N/A;  COLON POLYP, HEMORRHOIDS       Family History   Problem Relation Age of Onset   • Cancer Mother    • Cancer Father    • Cancer Maternal Uncle    • Cancer Other    • Colon cancer Neg Hx    • Malig Hyperthermia Neg Hx        Social History     Socioeconomic History   • Marital status:    Tobacco Use   • Smoking status: Never Smoker   • Smokeless tobacco: Former User     Quit date: 8/1/2021   Vaping Use   • Vaping Use: Never used   Substance and Sexual Activity   • Alcohol use: Yes     Comment: current some day    • Drug use: Never           Objective   Physical Exam  Constitutional:  No acute distress.  Head:  Atraumatic.  Normocephalic.   Eyes:  No scleral icterus. Normal conjunctiva  ENT:  Moist mucosa.  No nasal discharge present.  Cardiovascular:  Well perfused.  Equal pulses.  Regular rate.  Normal capillary refill.    Pulmonary/Chest:  No respiratory distress.  Airway patent.  No tachypnea.  No accessory muscle usage.    Abdominal:  Non-distended. Non-tender.   Extremities:  No peripheral edema.  No Deformity  Skin:  Warm, dry  Neurological:  Alert and oriented x 4. PERRL.  EOMI.  Cranial nerves II-XII are intact.  Strength is equal and 5/5 in the upper and lower extremities bilaterally.  Decreased sensation  "on right arm, right lips, right leg compared to left.  No pronator drift.  Normal speech.  Normal cerebellar function during finger-nose-finger and heel to shin.         Procedures          ED Course      /97   Pulse 91   Temp 98.6 °F (37 °C) (Oral)   Resp 14   Ht 172.7 cm (68\")   Wt 96.2 kg (212 lb)   SpO2 96%   BMI 32.23 kg/m²   Labs Reviewed   COMPREHENSIVE METABOLIC PANEL - Abnormal; Notable for the following components:       Result Value    Glucose 230 (*)     ALT (SGPT) 73 (*)     AST (SGOT) 57 (*)     All other components within normal limits    Narrative:     GFR Normal >60  Chronic Kidney Disease <60  Kidney Failure <15     POCT GLUCOSE FINGERSTICK - Abnormal; Notable for the following components:    Glucose 208 (*)     All other components within normal limits   POCT GLUCOSE FINGERSTICK - Abnormal; Notable for the following components:    Glucose 215 (*)     All other components within normal limits   COVID-19,CEPHEID/BIJAL,COR/AMBROSIO/PAD/BREANNE IN-HOUSE,NP SWAB IN TRANSPORT MEDIA 3-4 HR TAT, RT-PCR - Normal    Narrative:     Fact sheet for providers: https://www.fda.gov/media/446663/download     Fact sheet for patients: https://www.fda.gov/media/508140/download  Fact sheet for providers: https://www.fda.gov/media/871448/download    Fact sheet for patients: https://www.fda.gov/media/729286/download    Test performed by PCR.   PROTIME-INR - Normal   APTT - Normal   TROPONIN (IN-HOUSE) - Normal    Narrative:     Troponin T Reference Range:  <= 0.03 ng/mL-   Negative for AMI  >0.03 ng/mL-     Abnormal for myocardial necrosis.  Clinicians would have to utilize clinical acumen, EKG, Troponin and serial changes to determine if it is an Acute Myocardial Infarction or myocardial injury due to an underlying chronic condition.       Results may be falsely decreased if patient taking Biotin.     CBC WITH AUTO DIFFERENTIAL - Normal   T4, FREE - Normal    Narrative:     Results may be falsely increased if " patient taking Biotin.     TSH - Normal   COVID PRE-OP / PRE-PROCEDURE SCREENING ORDER (NO ISOLATION)    Narrative:     The following orders were created for panel order COVID PRE-OP / PRE-PROCEDURE SCREENING ORDER (NO ISOLATION) - Swab, Nasopharynx.  Procedure                               Abnormality         Status                     ---------                               -----------         ------                     COVID-19,CEPHEID/BIJAL,CO...[[id number]]  Normal              Final result                 Please view results for these tests on the individual orders.   RAINBOW DRAW    Narrative:     The following orders were created for panel order [place] Draw.  Procedure                               Abnormality         Status                     ---------                               -----------         ------                     Green Top (Gel)[[id number]]                                  Final result               Lavender Top[[id number]]                                     Final result               Gold Top - SST[[id number]]                                   Final result               Light Blue Top[[id number]]                                   Final result                 Please view results for these tests on the individual orders.   FOLATE   VITAMIN B12   POCT GLUCOSE FINGERSTICK   POCT GLUCOSE FINGERSTICK   POCT GLUCOSE FINGERSTICK   POCT GLUCOSE FINGERSTICK   POCT GLUCOSE FINGERSTICK   POCT GLUCOSE FINGERSTICK   POCT GLUCOSE FINGERSTICK   POCT GLUCOSE FINGERSTICK   POCT GLUCOSE FINGERSTICK   POCT GLUCOSE FINGERSTICK   POCT GLUCOSE FINGERSTICK   TYPE AND SCREEN   BB ARMBAND CHECK   CBC AND DIFFERENTIAL    Narrative:     The following orders were created for panel order CBC & Differential.  Procedure                               Abnormality         Status                     ---------                               -----------         ------                     CBC Auto Differential[[id number]]         Normal              Final result                 Please view results for these tests on the individual orders.   GREEN TOP   LAVENDER TOP   GOLD TOP - SST   LIGHT BLUE TOP     Medications   sodium chloride 0.9 % flush 10 mL (has no administration in time range)   sodium chloride 0.9 % flush 10 mL (10 mL Intravenous Given 6/9/22 1017)   sodium chloride 0.9 % flush 10 mL (has no administration in time range)   aspirin tablet 325 mg (has no administration in time range)     Or   aspirin suppository 300 mg (has no administration in time range)   labetalol (NORMODYNE,TRANDATE) injection 10 mg (has no administration in time range)   nitroglycerin (NITROSTAT) SL tablet 0.4 mg (has no administration in time range)   sodium chloride 0.9 % flush 10 mL (10 mL Intravenous Given 6/9/22 1130)   sodium chloride 0.9 % flush 10 mL (has no administration in time range)   sodium chloride 0.9 % infusion (75 mL/hr Intravenous New Bag 6/9/22 1304)   aluminum-magnesium hydroxide-simethicone (MAALOX MAX) 400-400-40 MG/5ML suspension 15 mL (has no administration in time range)   acetaminophen (TYLENOL) tablet 650 mg (has no administration in time range)     Or   acetaminophen (TYLENOL) suppository 650 mg (has no administration in time range)   ondansetron (ZOFRAN) tablet 4 mg (has no administration in time range)     Or   ondansetron (ZOFRAN) injection 4 mg (has no administration in time range)   pantoprazole (PROTONIX) injection 40 mg (40 mg Intravenous Given 6/9/22 1145)   dextrose (GLUTOSE) oral gel 15 g (has no administration in time range)   dextrose (D50W) (25 g/50 mL) IV injection 25 g (has no administration in time range)   glucagon (human recombinant) (GLUCAGEN DIAGNOSTIC) 1 mg in sterile water (preservative free) 1 mL injection (has no administration in time range)   insulin lispro (ADMELOG) injection 0-7 Units (3 Units Subcutaneous Given 6/9/22 1223)     And   insulin lispro (ADMELOG) injection 0-7 Units (has no administration in  time range)   atorvastatin (LIPITOR) tablet 40 mg (has no administration in time range)   iopamidol (ISOVUE-370) 76 % injection 100 mL (100 mL Intravenous Given 6/9/22 0803)   alteplase (ACTIVASE) bolus from vial (8.82 mg Intravenous Given 6/9/22 0908)   alteplase (ACTIVASE) 100 mg kit (0 mg/kg × 98 kg Intravenous Stopped 6/9/22 1009)   sodium chloride 0.9 % infusion (0 mL/kg/hr × 98 kg Intravenous Stopped 6/9/22 1029)     CT Angiogram Neck    Result Date: 6/9/2022  1. Patent major intracranial vasculature without findings of large vessel occlusion. 2. Patent carotid arteries with mild atherosclerotic calcification. No significant stenosis by NASCET criteria. 2. Patent vertebral arteries. 3. Incidental CT findings above.  I discussed the findings with Dr. Chatman at 8:12 AM on 06/09/2022.  Electronically Signed By-Christofer Rosario MD On:6/9/2022 8:28 AM This report was finalized on [id number] by  Christofer Rosario MD.    XR Chest 1 View    Result Date: 6/9/2022  No acute chest findings.  Electronically Signed By-Ailyn Farfan MD On:6/9/2022 8:31 AM This report was finalized on [id number] by  Ailyn Farfan MD.    CT Head Without Contrast Stroke Protocol    Result Date: 6/9/2022  IMPRESSION : 1. No intracranial hemorrhage. 2. Mild white matter findings most compatible with chronic microvascular disease.  Electronically Signed By-Christofer Rosario MD On:6/9/2022 8:08 AM This report was finalized on [id number] by  Christofer Rosario MD.    CT Angiogram Head w AI Analysis of LVO    Result Date: 6/9/2022  1. Patent major intracranial vasculature without findings of large vessel occlusion. 2. Patent carotid arteries with mild atherosclerotic calcification. No significant stenosis by NASCET criteria. 2. Patent vertebral arteries. 3. Incidental CT findings above.  I discussed the findings with Dr. Chatman at 8:12 AM on 06/09/2022.  Electronically Signed By-Christofer Rosario MD On:6/9/2022 8:28 AM This report was finalized on  [id number] by  Christofer Rosario MD.                                               MDM   Critical Care Time     The high probability of sudden, clinically significant deterioration in the patient's condition required the highest level of my preparedness to intervene urgently.  The services I provided to this patient were to treat and/or prevent clinically significant deterioration that could result in: Neurologic collapse. Services included the following: chart data review, reviewing nurses notes an/or old charts, documentation time, consultant collaboration regarding findings and treatment options, vital sign assessments and ordering, interpreting and reviewing diagnostic studies/lab test.  Aggregate critical care time was 38 minutes, which includes only time during which I was engaged in work directly related to the patient's care, as described above, whether at the bedside or elsewhere in the Emergency Department. It did not include time spent performing other reported procedures or the services of residents, students, nurses or physician assistants.    \  EKG # 1  Signed and interpreted by the EP.  Time Interpreted: 8:20 AM  Rate: 76  Rhythm: Normal sinus rhythm  Axis: Within normal limits  Intervals: Within normal limits  ST Segments: No acute ischemic changes     Comparison: None available    Level of Consciousness: alert, keenly responsive 0?   Ask month and age: both questions right 0?   Blinks eyes & squeeze hands: performs both tasks 0?   Horizontal extraocular movements: Normal 0?   Visual fields: No visual loss 0?   Facial palsy: normal symmetry 0?   Left arm motor drift: No drift for 10 sec 0?   Right arm motor drift: No drift for 10 sec 0?   Left leg motor drift: No drift for 5 sec?   Right leg motor drift: No drift for 5 sec 0?   Limb ataxia (finger to nose, heel to shin): No ataxia 0?(1 on repeat exam)   Sensation: Mild to Moderate loss 1? (still 1 on repeat exam, but pt reports to be worsening    Language/aphasia: normal, no aphasia 0?   Dysarthria: normal 0?   Extension/inattention: no abnormality 0?  Total: 1 initially, 2 on repeat exam    Symptoms appear to be worsening on reevaluation.  Discussed with Dr. No who recommends giving tPA given symptoms worsening and now patient having difficulties walking on reassessment secondary to numbness in his foot.  No contraindications.  Patient denies history of GI or intracranial hemorrhage.  Will give tPA.  Final diagnoses:   Acute CVA (cerebrovascular accident) (HCC)       ED Disposition  ED Disposition     ED Disposition   Decision to Admit    Condition   --    Comment   Level of Care: Critical Care [6]   Diagnosis: Numbness and tingling of right upper and lower extremity [7339211]   Admitting Physician: PAIGE MERLOS [[id number]]   Attending Physician: PAIGE MERLOS [[id number]]   Isolate for COVID?: No [0]   Certification: I Certify That Inpatient Hospital Services Are Medically Necessary For Greater Than 2 Midnights               No follow-up provider specified.       Medication List      ASK your doctor about these medications    * metFORMIN  MG 24 hr tablet  Commonly known as: GLUCOPHAGE-XR  Ask about: Which instructions should I use?     * metFORMIN  MG 24 hr tablet  Commonly known as: GLUCOPHAGE-XR  Ask about: Which instructions should I use?         * This list has 2 medication(s) that are the same as other medications prescribed for you. Read the directions carefully, and ask your doctor or other care provider to review them with you.                 Mehran Chatman MD  06/09/22 1603      Electronically signed by Mehran Chatman MD at 06/09/22 1603          Physician Progress Notes (last 24 hours)      Paige Merlos MD at 06/10/22 1038          ICU Daily Progress Note        Acute ischemic thalamic stroke, left (HCC)    Diabetes mellitus, type 2 (HCC)    Essential hypertension    Hyperlipidemia    Numbness and tingling of right upper and lower  extremity        Assessment & Plan        57-year-old male presented with acute CVA new onset right-sided numbness occurred 10 minutes prior to arrival while he was driving     Past medical history of hypertension, hyperlipidemia, diabetes mellitus  On evaluation by the ER symptoms appear to be worsening CT head showed no intracranial hemorrhage mild white matter findings suggestive of chronic microvascular disease, CT angiogram head no significant stenosis patent vertebral arteries no large vessel occlusion with patent carotid arteries     Neurology was consulted tPA was given at 9:08 AM           Plan  Repeat CT scan this morning showed small evolving thalamic CVA  However neurologically patient improved he was ambulating this morning   No autonomic dysfunction  Aspirin after 24 hours  Blood pressure control  Glycemic control hemoglobin A1c was 9 we will start Lantus and consult endocrinology for outpatient follow-up             LOS: 1 day         Vital signs for last 24 hours:  Vitals:    06/10/22 0600 06/10/22 0608 06/10/22 0700 06/10/22 0800   BP:  122/85 143/86 146/80   Pulse:  73 74 68   Resp:       Temp:    97.7 °F (36.5 °C)   TempSrc:    Oral   SpO2:  97% 94% 95%   Weight: 95.7 kg (210 lb 15.7 oz)      Height:           Intake/Output last 3 shifts:  I/O last 3 completed shifts:  In: 1605.5 [P.O.:680; I.V.:925.5]  Out: 2525 [Urine:2525]  Intake/Output this shift:  I/O this shift:  In: 240 [P.O.:240]  Out: -     Vent settings for last 24 hours:       Hemodynamic parameters for last 24 hours:       Radiology  Imaging Results (Last 24 Hours)     Procedure Component Value Units Date/Time    CT Head Without Contrast [[id number]] Collected: 06/10/22 0719     Updated: 06/10/22 0722    Narrative:      EXAMINATION: CT HEAD WO CONTRAST    DATE: 6/10/2022 6:16 AM     INDICATION: Headache. Status post TPA.     COMPARISON: CT head June 9, 2022     TECHNIQUE: Thin section noncontrast axial images were obtained through the  head. Coronal reformatted images were created.  CT dose lowering techniques were used, to include: automated exposure control, adjustment for patient size, and or use of iterative   reconstruction    FINDINGS:    INTRACRANIAL:  No acute intracranial hemorrhage.  No acute territorial infarct.    Evolving small infarct in the left thalamus.  No significant mass effect. No hydrocephalus.      EXTRACRANIAL:  Globes and orbits are unremarkable.  Paranasal sinuses are essentially clear.  Mastoid air cells are clear.  Calvarium is intact.      Impression:      1.  Evolving small recent infarct within the left thalamus.  2.  No acute intracranial hemorrhage.                Electronically signed by:  Gordo Hallman DO    6/10/2022 5:21 AM    MRI Brain Without Contrast [[id number]] Collected: 06/09/22 1639     Updated: 06/09/22 1645    Narrative:      MRI BRAIN WO CONTRAST-     Date of Exam: 6/9/2022 4:10 PM     Indication: Neuro deficit, acute, stroke suspected; I63.9-Cerebral  infarction, unspecified     Comparison: CT angiography of the head and neck and noncontrast CT head  6/9/2022.     Technique: Multiplanar multisequence images of the brain were performed  without contrast according to routine brain MRI protocol.     FINDINGS:  Subtle 9 x 3 mm focus of restricted diffusion in the left thalamus is  consistent with recent ischemia. There is no hemorrhagic transformation.     Major vascular flow voids appear preserved. No mass lesion or mass  effect or midline shift. Normal ventricular configuration. No abnormal  pituitary enlargement. Imaged portion cervical spinal cord is normal. No  calvarial abnormality. Mild right maxillary sinus, as right sphenoid  sinus and bilateral ethmoid sinus mucosal thickening. Mastoid air cells  are clear. Orbital structures are normal.          Impression:      FINDINGS consistent with 9 x 3 mm acute or subacute infarct within the  left thalamus.        Electronically Signed By-Ailyn Farfan  MD On:6/9/2022 4:43 PM  This report was finalized on [id number] by  Ailyn Farfan MD.          Labs:  Results from last 7 days   Lab Units 06/10/22  0401   WBC 10*3/mm3 5.50   HEMOGLOBIN g/dL 14.4   HEMATOCRIT % 40.4   PLATELETS 10*3/mm3 142     Results from last 7 days   Lab Units 06/10/22  0401   SODIUM mmol/L 136   POTASSIUM mmol/L 4.1   CHLORIDE mmol/L 102   CO2 mmol/L 27.0   BUN mg/dL 15   CREATININE mg/dL 0.89   CALCIUM mg/dL 8.5*   BILIRUBIN mg/dL 0.5   ALK PHOS U/L 47   ALT (SGPT) U/L 55*   AST (SGOT) U/L 48*   GLUCOSE mg/dL 221*         Results from last 7 days   Lab Units 06/10/22  0401 06/09/22  0802   ALBUMIN g/dL 3.60 4.50     Results from last 7 days   Lab Units 06/09/22  0802   TROPONIN T ng/mL <0.010         Results from last 7 days   Lab Units 06/10/22  0401   MAGNESIUM mg/dL 1.7     Results from last 7 days   Lab Units 06/09/22  0802   INR  0.95   APTT seconds 24.9     Results from last 7 days   Lab Units 06/09/22  0802   TSH uIU/mL 2.030   FREE T4 ng/dL 1.09           Meds:   SCHEDULE  aspirin, 325 mg, Oral, Daily   Or  aspirin, 300 mg, Rectal, Daily  atorvastatin, 40 mg, Oral, Nightly  insulin lispro, 0-7 Units, Subcutaneous, 4x Daily With Meals & Nightly  lisinopril-hydroCHLOROthiazide (ZESTORETIC) 10-12.5 mg combo dose, , Oral, Q24H  pantoprazole, 40 mg, Intravenous, Q AM  sodium chloride, 10 mL, Intravenous, Q12H  sodium chloride, 10 mL, Intravenous, Q12H      Infusions     PRNs  •  acetaminophen **OR** acetaminophen  •  aluminum-magnesium hydroxide-simethicone  •  dextrose  •  dextrose  •  glucagon (human recombinant)  •  insulin lispro **AND** insulin lispro  •  labetalol  •  nitroglycerin  •  ondansetron **OR** ondansetron  •  sodium chloride  •  sodium chloride  •  sodium chloride    Physical Exam:  Physical Exam    ROS  Review of Systems   Respiratory: Negative for cough, shortness of breath and stridor.          Critical Care Time greater than: 45 Minutes  Total time spent with  patient greater than: 45 Minutes    Electronically signed by Paige Merlos MD at 06/10/22 1044          Consult Notes (last 24 hours)      Coby Byrd APRN at 06/10/22 0850              [facility] Medicine Services - Consult Note    Patient Name: Glenroy Penaloza  : 1965  MRN: [id number]  Primary Care Physician:  Rosie Vela APRN  Referring Physician: JANETT Juan  Date of admission: 2022    Consults    Subjective      Reason for Consult/ Chief Complaint: right sided numbess    History of Present Illness: Glenroy Penaloza is a 57 y.o. male with PMH of HTN, HLD, DM type II who presented to [facility] ED 2022 with complaints of right sided numbness to his face, right arm, and right leg that started approximately 10 minutes prior to arrival. He denied any slurred speech. He felt like his extremities were asleep. He denied any chest pain.    In the ED the patient had CT head that showed no intracranial hemorrhage. CTA head/neck showed no large vessel occlusion or significant stenosis.  He was determined to be a tPA candidate and was given tPA. He continued to have right sided numbness. He was admitted to the ICU for close neurological monitoring.     MRI brain showed left thalamic infarct.     Date:     6/10/2022 CT head showed evolving small recent infarct within the left thalamus. No acute intracranial hemorrhage.   Aspirin 325mg started, atorvastatin 40mg nightly     Review of Systems   Constitutional: Negative.   HENT: Negative.    Eyes: Negative.    Cardiovascular: Negative.    Respiratory: Negative.    Endocrine: Negative.    Hematologic/Lymphatic: Negative.    Skin: Negative.    Musculoskeletal: Negative.    Gastrointestinal: Negative.    Genitourinary: Negative.    Neurological: Positive for numbness.   Psychiatric/Behavioral: Negative.    Allergic/Immunologic: Negative.    All other systems reviewed and are negative.       Personal History     Past Medical History:    Diagnosis Date   • Atypical rash    • Diabetes mellitus, type 2 (HCC)    • Disorder of male genital organs    • Elevated glucose    • Essential hypertension 07/30/2020   • Glucosuria    • Hyperlipidemia        Past Surgical History:   Procedure Laterality Date   • COLONOSCOPY N/A 4/4/2022    Procedure: COLONOSCOPY WITH POLYPECTOMY;  Surgeon: Arsen Payan MD;  Location: [facility] ENDOSCOPY;  Service: Gastroenterology;  Laterality: N/A;  COLON POLYP, HEMORRHOIDS       Family History: family history includes Cancer in his father, maternal uncle, mother, and another family member. Otherwise pertinent FHx was reviewed and not pertinent to current issue.    Social History:  reports that he has never smoked. He quit smokeless tobacco use about 10 months ago. He reports current alcohol use. He reports that he does not use drugs.    Home Medications:   lisinopril-hydrochlorothiazide, lovastatin, and metFORMIN ER    Allergies:  No Known Allergies      Objective      Vitals:  Temp:  [97.4 °F (36.3 °C)-98.6 °F (37 °C)] 97.7 °F (36.5 °C)  Heart Rate:  [50-97] 68  Resp:  [12-19] 19  BP: (122-164)/() 146/80    Physical Exam  Vitals and nursing note reviewed.   HENT:      Head: Normocephalic and atraumatic.   Eyes:      Extraocular Movements: Extraocular movements intact.      Pupils: Pupils are equal, round, and reactive to light.   Cardiovascular:      Rate and Rhythm: Normal rate and regular rhythm.      Pulses: Normal pulses.      Heart sounds: Normal heart sounds.   Pulmonary:      Effort: Pulmonary effort is normal.      Breath sounds: Normal breath sounds.   Abdominal:      General: Bowel sounds are normal.      Palpations: Abdomen is soft.      Tenderness: There is no abdominal tenderness.   Musculoskeletal:         General: Normal range of motion.   Skin:     General: Skin is warm and dry.   Neurological:      Mental Status: He is alert and oriented to person, place, and time.      Comments: Right sided face,  arm, and leg still with numbness  Normal ROM, no facial droop, no slurred speech   Psychiatric:         Mood and Affect: Mood normal.         Behavior: Behavior normal.         Result Review    Result Review:  I have personally reviewed the results from the time of this admission to 6/10/2022 08:50 EDT and agree with these findings:  [x]  Laboratory  []  Microbiology  [x]  Radiology  []  EKG/Telemetry   []  Cardiology/Vascular   []  Pathology  [x]  Old records      Assessment & Plan        Active Hospital Problems:  Active Hospital Problems    Diagnosis    • **Acute ischemic thalamic stroke, left (HCC)    • Numbness and tingling of right upper and lower extremity    • Hyperlipidemia    • Diabetes mellitus, type 2 (HCC)    • Essential hypertension      Plan:     Acute left ischemic thalamic stroke   -right sided numbness symptoms continue  -CT head 6/9/2022: no acute intracranial hemorrhage  -CTA head/neck: no large vessel occlusion or significant stenosis  -MRI brain: left thalamic infarct  -CT head 6/10/2022: Evolving small recent infarct within the left thalamus.  No acute intracranial hemorrhage.  -Started on 325 mg aspirin, 40 mg Lipitor  -neurology following     Hypertension  -Controlled.  Continue home lisinopril/HCTZ    Hyperlipidemia  -Statin as above    Diabetes mellitus type 2  -SSI AC&HS  -hgb a1c 9.5%    VTE prophylaxis - SCDs      This patient has been examined wearing appropriate Personal Protective Equipment    Signature: Electronically signed by JANETT Allen, 06/10/22, 10:42 AM EDT.    Electronically signed by Coby Byrd APRN at 06/10/22 1042     Mikaela Lopez MD at 06/09/22 1219      Consult Orders    1. Inpatient Neurology Consult Stroke [[id number]] ordered by Keshav Cesar APRN at 06/09/22 0941               Primary Care Provider: Rosie Veal APRN     Consult requested by: ER team  Reason for Consultation: Neurological evaluation /code stroke    History taken  from: patient chart family RN    Chief complaint: Numbness on the right side    Subjective   SUBJECTIVE:    History of present illness: Background per H&P: Glenroy Penaloza is a 57 y.o. year old male who was evaluated in room ICU 2307 at [facility]    Source of information is the patient and my discussion with the team members    This gentleman was in usual state of health and he was driving to work and he was chewing his sunflower seeds and noticed that his face or tongue was numb.  He then noticed that his right upper extremity and also the right lower extremity was numb.  This was to the extent that he had to use his left foot to break because he wanted to make sure his sensation is okay and he can put appropriate pressure.  There was no weakness    So he came to the ER and initial NIH was very low with some numbness only and I was concerned about ataxia and initially it was not reported.  Even the ER team got him up and had him walk.  When he came back from his CT, negative, CTA also not showing anything major my concern was that he started feeling worse.  He said that when he is walking he did not know where his foot was.  Because he was within the window and getting worse and we did not want to miss a chance to lose the window of opportunity of treating him with alteplase, after discussing with him and going through the inclusion exclusion criteria he was given alteplase.  He is stable but still has some numbness on the right side.  I evaluated him in the ICU and he is now ataxic in the right upper extremity also.  MRI and further work-up will be done    I had very detailed discussion with him regarding his risk factors which she has plenty!  Alteplase and further work-up process was explained to him  His initial blood pressure was slightly elevated but is relatively stable now    No head injury or migraine or seizures  No prior stroke or TIA  He is not on aspirin or lipid-lowering medication  He used to  chew tobacco but not anymore    As per admitting 57-year-old male presented with acute CVA new onset right-sided numbness occurred 10 minutes prior to arrival while he was driving     Past medical history of hypertension, hyperlipidemia, diabetes mellitus  On evaluation by the ER symptoms appear to be worsening CT head showed no intracranial hemorrhage mild white matter findings suggestive of chronic microvascular disease, CT angiogram head no significant stenosis patent vertebral arteries no large vessel occlusion with patent carotid arteries     Neurology was consulted tPA was given at 9:08 AM       Numbness and tingling of right upper and lower extremity      As per ER team,  History of Present Illness  History Provided By: Pt     Chief Complaint: Numbness on right face, right arm, right leg  Onset: 10 minutes prior to arrival  Timing: Worsening  Location: Right face, right arm, right leg  Quality: Decree sensation, not completely numb  Severity: Mild to moderate  Modifying Factors: None     Other: 57-year-old male with prior medical history of hypertension, hyperlipidemia, diabetes presents for new onset right-sided numbness.  Occurred approximately 10 minutes prior to arrival while he was driving.  Never had anything like this before.  No weakness.  Denies any issues walking.  No other recent symptoms.  Does not feel dizzy.     Review of Systems   Cardiovascular: Negative for chest pain.   Neurological: Positive for numbness. Negative for dizziness, seizures, syncope, facial asymmetry, speech difficulty, weakness, light-headedness and headaches.   All other systems reviewed and are negative.       Symptoms appear to be worsening on reevaluation.  Discussed with Dr. No who recommends giving tPA given symptoms worsening and now patient having difficulties walking on reassessment secondary to numbness in his foot.  No contraindications.  Patient denies history of GI or intracranial hemorrhage.  Will give  tPA.    - Portions of the above HPI were copied from previous encounters and edited as appropriate. PMH as detailed below.     Review of Systems   No fever chills rigors or sweats  No weight issues  No sleep problems  HEENT:  No speech problem, vision changes, facial asymmetry or pain, or neck problem  Chest: No chest pain, clubbing, cyanosis, orthopnea palpitations  Pulmonary:  No shortness of air, cough or expectoration  Abdomen:  No swelling/tension, constipation,diarrhea or pain  No genitourinary symptoms  Extremity problems as discussed  No back problem  No psychotic issues  Neurologic issues as discussed  No hematologic, dermatologic or endocrine problems he is diabetic though        PATIENT HISTORY:  Past Medical History:   Diagnosis Date   • Atypical rash    • Diabetes mellitus, type 2 (HCC)    • Disorder of male genital organs    • Elevated glucose    • Essential hypertension 07/30/2020   • Glucosuria    • Hyperlipidemia    ,   Past Surgical History:   Procedure Laterality Date   • COLONOSCOPY N/A 4/4/2022    Procedure: COLONOSCOPY WITH POLYPECTOMY;  Surgeon: Arsen Payan MD;  Location: [facility] ENDOSCOPY;  Service: Gastroenterology;  Laterality: N/A;  COLON POLYP, HEMORRHOIDS   ,   Family History   Problem Relation Age of Onset   • Cancer Mother    • Cancer Father    • Cancer Maternal Uncle    • Cancer Other    • Colon cancer Neg Hx    • Malig Hyperthermia Neg Hx    ,   Social History     Tobacco Use   • Smoking status: Never Smoker   • Smokeless tobacco: Former User     Quit date: 8/1/2021   Vaping Use   • Vaping Use: Never used   Substance Use Topics   • Alcohol use: Yes     Comment: current some day    • Drug use: Never   ,   Prior to Admission medications    Medication Sig Start Date End Date Taking? Authorizing Provider   lisinopril-hydrochlorothiazide (PRINZIDE,ZESTORETIC) 10-12.5 MG per tablet Take 1 tablet by mouth Daily. 1/20/22  Yes Rosie Vela APRN   lovastatin (MEVACOR) 40 MG tablet  Take 1 tablet by mouth Daily With Dinner. 1/20/22  Yes Rosie Vela APRN   metFORMIN ER (GLUCOPHAGE-XR) 500 MG 24 hr tablet Take 1,000 mg by mouth Daily With Breakfast.   Yes Ramón Salas MD   metFORMIN ER (GLUCOPHAGE-XR) 500 MG 24 hr tablet Take 500 mg by mouth Daily With Dinner.   Yes Ramón Salas MD   metFORMIN ER (GLUCOPHAGE-XR) 500 MG 24 hr tablet Take 2 in the morning and 1 in the evening.  Patient taking differently: Take 1,000 mg by mouth. Take 2 in the morning and 1 in the evening. 2/11/22 6/9/22 Yes Rosie Vela APRN   hydroCHLOROthiazide (HYDRODIURIL) 12.5 MG tablet Take 12.5 mg by mouth Daily.  6/9/22  ProviderRamón MD   polyethylene glycol (GoLYTELY) 236 g solution Starting at noon on day prior to procedure, drink 8 ounces every 30 minutes until all gone or stools are clear. May add flavor packet.  Patient taking differently: Take 4 L by mouth Every 12 (Twelve) Hours. 2/17/22 6/9/22  Yarely Reveles APRN    Allergies:  Patient has no known allergies.    Current Facility-Administered Medications   Medication Dose Route Frequency Provider Last Rate Last Admin   • acetaminophen (TYLENOL) tablet 650 mg  650 mg Oral Q4H PRN Keshav Cesar APRN        Or   • acetaminophen (TYLENOL) suppository 650 mg  650 mg Rectal Q4H PRN Keshav Cesar APRN       • aluminum-magnesium hydroxide-simethicone (MAALOX MAX) 400-400-40 MG/5ML suspension 15 mL  15 mL Oral Q6H PRN Keshav Cesar APRN       • [START ON 6/10/2022] aspirin tablet 325 mg  325 mg Oral Daily Keshav Cesar APRN        Or   • [START ON 6/10/2022] aspirin suppository 300 mg  300 mg Rectal Daily Keshav Cesar APRN       • atorvastatin (LIPITOR) tablet 80 mg  80 mg Oral Nightly Keshav Cesar APRN       • dextrose (D50W) (25 g/50 mL) IV injection 25 g  25 g Intravenous Q15 Min PRN Keshav Cesar APRN       • dextrose (GLUTOSE) oral gel 15 g  15 g Oral Q15 Min PRN Keshav Cesar, APRN       •  glucagon (human recombinant) (GLUCAGEN DIAGNOSTIC) 1 mg in sterile water (preservative free) 1 mL injection  1 mg Intramuscular Q15 Min PRN Keshav Cesar APRN       • insulin lispro (ADMELOG) injection 0-7 Units  0-7 Units Subcutaneous 4x Daily With Meals & Nightly Keshav Cesar APRN        And   • insulin lispro (ADMELOG) injection 0-7 Units  0-7 Units Subcutaneous PRN Keshav Cesar APRN       • labetalol (NORMODYNE,TRANDATE) injection 10 mg  10 mg Intravenous Q10 Min PRN Keshav Cesar APRN       • nitroglycerin (NITROSTAT) SL tablet 0.4 mg  0.4 mg Sublingual Q5 Min PRN Keshav Cesar APRN       • ondansetron (ZOFRAN) tablet 4 mg  4 mg Oral Q6H PRN Keshav Cesar APRN        Or   • ondansetron (ZOFRAN) injection 4 mg  4 mg Intravenous Q6H PRN Keshav Cesar APRN       • pantoprazole (PROTONIX) injection 40 mg  40 mg Intravenous Q AM Keshav Cesar APRN   40 mg at 06/09/22 1145   • sodium chloride 0.9 % flush 10 mL  10 mL Intravenous PRN Mehran Chatman MD       • sodium chloride 0.9 % flush 10 mL  10 mL Intravenous Q12H Keshav Cesar APRN   10 mL at 06/09/22 1017   • sodium chloride 0.9 % flush 10 mL  10 mL Intravenous PRN Keshav Cesar APRN       • sodium chloride 0.9 % flush 10 mL  10 mL Intravenous Q12H Keshav Cesar APRN   10 mL at 06/09/22 1130   • sodium chloride 0.9 % flush 10 mL  10 mL Intravenous PRN Keshav Cesar APRN       • sodium chloride 0.9 % infusion  75 mL/hr Intravenous Continuous Keshav Cesar APRN 75 mL/hr at 06/09/22 1029 75 mL/hr at 06/09/22 1029        ________________________________________________________     Objective   OBJECTIVE:  Upon today's exam, the gentleman is resting comfortably in bed in no acute distress     Neurologic Exam    The patient is awake and alert and oriented x3     Cranial nerve examination demonstrate:  Full fields of vision to confrontation  Pupils are round, reactive to light and accommodation and size of about 3 mm  No ptosis  or nystagmus  Funduscopic examination was not successful  Eye movements are conjugate     Sensation on the face and scalp are normal  Muscles of mastication are normal and symmetric  Muscles of  facial expression are normal and symmetric  Hearing is intact bilaterally  Head turning and shoulder shrugs were unremarkable  Tongue was midline  I could not visualize her oropharynx or uvula     Motor examination:  Normal bulk, tone and strength was 5/5  No fasciculations     Sensory examination:  Decreased sensation on the face on the right side, right upper extremity and right lower extremity  Romberg was not evaluated     Reflexes:  0/4     Coordination:  Some ataxia in the right upper extremity for finger-to-nose to finger but otherwise left side and bilateral lower extremities are normal for rapid alternating movements, finger-nose to finger and in the lower extremities heel-to-shin bilaterally     Gait:  Deferred     Toe signs:  Mute    NIH stroke scale  NIHSS:    Level Of Consciousness 0  0=Alert; keenly responsive 1=Not alert, but arousable by minor stimulation 2=Not alert, requires repeated stimulation 3=Responds only with reflex movements    LOC Questions to Month and age 0  0=Answers both questions correctly 1=Answers one question correctly 2=Answers neither question correctly    LOC Commands      -Open/Close eyes 0    -Open/close  0   0=Performs both tasks correctly 1=Performs one task correctly 2=Performs neighter task correctly     Best Gaze 0  0=Normal 1=Partial gaze palsy 2=Forced deviation, or total gaze paresis    Visual 0  0=No visual loss 1=Partial hemianopia 2=Complete hemianopia 3=Bilateral hemianopia (blind including cortical blindness)    Facial Palsy 0  0=Normal symmetrical movement 1=Minor paralysis (asymmetry) 2=Partial paralysis (lower facde) 3=Complete paralysis (upper and lower face)    Motor: Left Arm-0  Left leg-0; Right Arm-0 Right Leg-0  0=No drift, limb holds posture for full 10  seconds 1=Drift, limb holds posture, no drift to bed 2=Some antigravity effort, cannot maintain posture, drifts to bed 3=No effort against gravity, limb falls 4=No movement    Limb Ataxia 1  0=Absent 1=Present in one limb 2=Present in two limbs    Sensory 1   0=Normal 1=Mild to moderate sensory loss 2=Severe to total sensory loss    Best Language 0   0=No aphasia, normal 1=Mild to moderate aphasia 2=Severe Aphasia (very few words correct or understood)3=Multe, global aphasia    Dysarthria 0  0=Normal 1=Mild to moderate 2=Severe, unintelligible or mute/anarthric    Extinction/Neglect 0   0=No abnormality 1=Extinction to bilateral simultaneous stimulation 2=Profound neglect    Total  __2      ________________________________________________________   RESULTS REVIEW:    VITAL SIGNS:   Temp:  [97.8 °F (36.6 °C)-98.6 °F (37 °C)] 98.6 °F (37 °C)  Heart Rate:  [65-97] 65  Resp:  [12-18] 14  BP: (142-196)/() 158/103     LABS:      Lab 06/09/22  0803 06/09/22  0802   WBC 6.10  --    HEMOGLOBIN 15.5  --    HEMATOCRIT 44.5  --    PLATELETS 177  --    NEUTROS ABS 2.80  --    LYMPHS ABS 2.50  --    MONOS ABS 0.60  --    EOS ABS 0.20  --    MCV 89.4  --    PROTIME  --  9.8   APTT  --  24.9         Lab 06/09/22  0802   SODIUM 138   POTASSIUM 3.9   CHLORIDE 100   CO2 24.0   ANION GAP 14.0   BUN 15   CREATININE 1.05   EGFR 82.3   GLUCOSE 230*   CALCIUM 9.9         Lab 06/09/22  0802   TOTAL PROTEIN 7.2   ALBUMIN 4.50   GLOBULIN 2.7   ALT (SGPT) 73*   AST (SGOT) 57*   BILIRUBIN 0.4   ALK PHOS 57         Lab 06/09/22  0802   TROPONIN T <0.010   PROTIME 9.8   INR 0.95             Lab 06/09/22  0802   ABO TYPING AB   RH TYPING Positive   ANTIBODY SCREEN Negative         UA    Urinalysis 7/15/21 1/22/22 1/22/22     1147 1147   Squamous Epithelial Cells, UA   0-2   Specific Gravity, UA 1.018 >=1.030    Ketones, UA Negative Trace (A)    Blood, UA Negative Negative    Leukocytes, UA Negative Negative    Nitrite, UA Negative Negative     RBC, UA   None Seen   WBC, UA   0-2   Bacteria, UA   None Seen   (A) Abnormal value              Lab Results   Component Value Date    TSH 1.270 07/15/2021     (H) 01/22/2022    HGBA1C 9.90 (H) 01/22/2022    [id number] 363 02/20/2021       IMAGING STUDIES:  CT Angiogram Neck    Result Date: 6/9/2022  1. Patent major intracranial vasculature without findings of large vessel occlusion. 2. Patent carotid arteries with mild atherosclerotic calcification. No significant stenosis by NASCET criteria. 2. Patent vertebral arteries. 3. Incidental CT findings above.  I discussed the findings with Dr. Chatman at 8:12 AM on 06/09/2022.  Electronically Signed By-Christofer Rosario MD On:6/9/2022 8:28 AM This report was finalized on [id number] by  Christofer Rosario MD.    XR Chest 1 View    Result Date: 6/9/2022  No acute chest findings.  Electronically Signed By-Ailyn Farfan MD On:6/9/2022 8:31 AM This report was finalized on [id number] by  Ailyn Farfan MD.    CT Head Without Contrast Stroke Protocol    Result Date: 6/9/2022  IMPRESSION : 1. No intracranial hemorrhage. 2. Mild white matter findings most compatible with chronic microvascular disease.  Electronically Signed By-Christofer Rosario MD On:6/9/2022 8:08 AM This report was finalized on [id number] by  Christofer Rosario MD.    CT Angiogram Head w AI Analysis of LVO    Result Date: 6/9/2022  1. Patent major intracranial vasculature without findings of large vessel occlusion. 2. Patent carotid arteries with mild atherosclerotic calcification. No significant stenosis by NASCET criteria. 2. Patent vertebral arteries. 3. Incidental CT findings above.  I discussed the findings with Dr. Chatman at 8:12 AM on 06/09/2022.  Electronically Signed By-Christofer Rosario MD On:6/9/2022 8:28 AM This report was finalized on [id number] by  Christofer Rosario MD.      I reviewed the patient's new clinical results.    ________________________________________________________     PROBLEM LIST:     Numbness and tingling of right upper and lower extremity            ASSESSMENT/PLAN:  Code stroke  Status post alteplase, received in ER  I suspect thalamic infarct  I had very detailed discussion with him regarding strokes and what types and why we are doing all the work-up    I will get MRI brain and echo and based on that we will decide future course of action  He does not have significant risk factors    Aspirin to be started after his 24-hour post alteplase CT is negative, if that is the case  Further work-up as per orders    Risk factors discussed with him in detail and also with his family    Modification of stroke risk factors:   - Blood pressure should be less than 130/80 outpatient, HbA1c less than 6.5, LDL less than 70; b12>500 and smoking cessation if applicable. We would be grateful if the primary team / primary care physician would keep a close watch on the above targets.  - Stroke education  - Follow up with neurologist of choice      I discussed the patient's findings and my recommendations with patient, family and nursing staff    Mikaela Lopez MD  06/09/22  12:19 EDT          Electronically signed by Mikaela Lopez MD at 06/09/22 0167

## 2022-06-10 NOTE — PLAN OF CARE
Problem: Adult Inpatient Plan of Care  Goal: Plan of Care Review  6/10/2022 0459 by Rehan Pruitt RN  Outcome: Ongoing, Progressing  6/10/2022 0349 by Rehan Pruitt RN  Outcome: Ongoing, Progressing  Goal: Patient-Specific Goal (Individualized)  6/10/2022 0459 by Rehan Pruitt RN  Outcome: Ongoing, Progressing  6/10/2022 0349 by Rehan Pruitt RN  Outcome: Ongoing, Progressing  Goal: Absence of Hospital-Acquired Illness or Injury  6/10/2022 0459 by Rehan Pruitt RN  Outcome: Ongoing, Progressing  6/10/2022 0349 by Rehan Pruitt RN  Outcome: Ongoing, Progressing  Intervention: Identify and Manage Fall Risk  Description: Perform standard risk assessment on admission using a validated tool or comprehensive approach appropriate to the patient; reassess fall risk frequently, with change in status or transfer to another level of care.  Communicate fall injury risk to interprofessional healthcare team.  Determine need for increased observation, equipment and environmental modification, such as low bed, signage and supportive, nonskid footwear.  Adjust safety measures to individual developmental age, stage and identified risk factors.  Reinforce the importance of safety and physical activity with patient and family.  Perform regular intentional rounding to assess need for position change, pain assessment and personal needs, including assistance with toileting.  Intervention: Prevent Skin Injury  Description: Perform a screening for skin injury risk, such as pressure or moisture associated skin damage on admission and at regular intervals throughout hospital stay.  Keep all areas of skin (especially folds) clean and dry.  Maintain adequate skin hydration.  Relieve and redistribute pressure and protect bony prominences; implement measures based on patient-specific risk factors.  Match turning and repositioning schedule to clinical condition.  Encourage weight shift frequently; assist with reposition if unable to  complete independently.  Float heels off bed; avoid pressure on the Achilles tendon.  Keep skin free from extended contact with medical devices.  Encourage functional activity and mobility, as early as tolerated.  Use aids (e.g., slide boards, mechanical lift) during transfer.  Intervention: Prevent and Manage VTE (Venous Thromboembolism) Risk  Description: Assess for VTE (venous thromboembolism) risk.  Encourage and assist with early ambulation.  Initiate and maintain compression or other therapy, as indicated, based on identified risk in accordance with organizational protocol and provider order.  Encourage both active and passive leg exercises while in bed, if unable to ambulate.  Intervention: Prevent Infection  Description: Maintain skin and mucous membrane integrity; promote hand, oral and pulmonary hygiene.  Optimize fluid balance, nutrition, sleep and glycemic control to maximize infection resistance.  Identify potential sources of infection early to prevent or mitigate progression of infection (e.g., wound, lines, devices).  Evaluate ongoing need for invasive devices; remove promptly when no longer indicated.  Goal: Optimal Comfort and Wellbeing  6/10/2022 0459 by Rehan Pruitt, RN  Outcome: Ongoing, Progressing  6/10/2022 0349 by Rehan Pruitt, RN  Outcome: Ongoing, Progressing  Intervention: Monitor Pain and Promote Comfort  Description: Assess pain level, treatment efficacy and patient response at regular intervals using a consistent pain scale.  Consider the presence and impact of preexisting chronic pain.  Encourage patient and caregiver involvement in pain assessment, interventions and safety measures.  Intervention: Provide Person-Centered Care  Description: Use a family-focused approach to care.  Develop trust and rapport by proactively providing information, encouraging questions, addressing concerns and offering reassurance.  Acknowledge emotional response to hospitalization.  Recognize and  utilize personal coping strategies.  Honor spiritual and cultural preferences.  Goal: Readiness for Transition of Care  6/10/2022 0459 by Rehan Pruitt, RN  Outcome: Ongoing, Progressing  6/10/2022 0349 by Rehan Pruitt RN  Outcome: Ongoing, Progressing     Problem: Adjustment to Illness (Stroke, Ischemic/Transient Ischemic Attack)  Goal: Optimal Coping  Outcome: Ongoing, Progressing     Problem: Bowel Elimination Impaired (Stroke, Ischemic/Transient Ischemic Attack)  Goal: Effective Bowel Elimination  Outcome: Ongoing, Progressing     Problem: Cerebral Tissue Perfusion (Stroke, Ischemic/Transient Ischemic Attack)  Goal: Optimal Cerebral Tissue Perfusion  Outcome: Ongoing, Progressing     Problem: Cognitive Impairment (Stroke, Ischemic/Transient Ischemic Attack)  Goal: Optimal Cognitive Function  Outcome: Ongoing, Progressing     Problem: Communication Impairment (Stroke, Ischemic/Transient Ischemic Attack)  Goal: Improved Communication Skills  Outcome: Ongoing, Progressing     Problem: Functional Ability Impaired (Stroke, Ischemic/Transient Ischemic Attack)  Goal: Optimal Functional Ability  Outcome: Ongoing, Progressing  Intervention: Optimize Functional Ability  Description: Evaluate and retrain in functional mobility and ADLs (activities of daily living) based on ability and tolerance; provide level of assistance and supervision needed for safety.  Initiate sitting and standing when able; evaluate and address balance, postural control and fall risk.  Instruct in mobility and ADL (activities of daily living) techniques with task-specific training individualized to patient needs and discharge disposition; promote highest level of independence and safety.  Pace, coordinate and organize care schedule and activity per patient preference, priorities and tolerance; train in energy-conservation techniques.  Provide repetitive, mobility-task training for gait disturbances; consider ankle foot orthosis or functional  electrical stimulation if foot drop is present.  Consider multimodal therapeutic interventions, such as virtual reality, graded motor imagery/mirror therapy, robotic or treadmill training and FES (functional electrical stimulation).  Promote use of recommended adaptive equipment, assistive devices or orthoses, such as a cane or walker.  Maintain patient’s preferred routines and habits; respect privacy and personal space.  Provide a safe, barrier-free, uncluttered environment that promotes optimal level of function.     Problem: Respiratory Compromise (Stroke, Ischemic/Transient Ischemic Attack)  Goal: Effective Oxygenation and Ventilation  Outcome: Ongoing, Progressing  Intervention: Optimize Oxygenation and Ventilation  Description: Assess and monitor airway, breathing and circulation; maintain close surveillance for deterioration.  Maintain normal PaCO2 (partial pressure of carbon dioxide) to minimize risk of increased intracranial pressure and cerebral ischemia.  Maintain patent airway; position to minimize risk of obstruction, aspiration and ventilation-perfusion mismatch.  Provide oxygen therapy judiciously to avoid hyperoxemia; adjust to achieve oxygenation goal.  Encourage pulmonary hygiene and lung expansion therapy, such as deep breathing, suction and ambulation, to minimize respiratory complication risk.  Consider inspiratory muscle training to decrease the risk of pulmonary complications.  Recognize risk for obstructive sleep apnea; anticipate the need for continuous pulse oximetry and noninvasive positive pressure ventilation.  Anticipate the need for intubation and mechanical ventilation for airway protection and respiratory support.     Problem: Sensorimotor Impairment (Stroke, Ischemic/Transient Ischemic Attack)  Goal: Improved Sensorimotor Function  Outcome: Ongoing, Progressing     Problem: Swallowing Impairment (Stroke, Ischemic/Transient Ischemic Attack)  Goal: Optimal Eating and Swallowing  without Aspiration  Outcome: Ongoing, Progressing     Problem: Urinary Elimination Impaired (Stroke, Ischemic/Transient Ischemic Attack)  Goal: Effective Urinary Elimination  Outcome: Ongoing, Progressing     Problem: Diabetes Comorbidity  Goal: Blood Glucose Level Within Targeted Range  Outcome: Ongoing, Progressing     Problem: Hypertension Comorbidity  Goal: Blood Pressure in Desired Range  Outcome: Ongoing, Progressing   Goal Outcome Evaluation:  Plan of Care Reviewed With: patient        Progress: no change  Outcome Evaluation: Pt w/ numbness/tingling Sx, TPA received 9:08am. MRI showing thalamic stroke. Symtpoms persist w/ mild right sided-drift. No worsening symptoms. Monitoring closely. Stroke education begun.

## 2022-06-10 NOTE — PLAN OF CARE
Goal Outcome Evaluation:           Progress: no change      Problem: Adult Inpatient Plan of Care  Goal: Plan of Care Review  Outcome: Ongoing, Progressing  Goal: Patient-Specific Goal (Individualized)  Outcome: Ongoing, Progressing  Goal: Absence of Hospital-Acquired Illness or Injury  Outcome: Ongoing, Progressing  Intervention: Identify and Manage Fall Risk  Description: Perform standard risk assessment on admission using a validated tool or comprehensive approach appropriate to the patient; reassess fall risk frequently, with change in status or transfer to another level of care.  Communicate fall injury risk to interprofessional healthcare team.  Determine need for increased observation, equipment and environmental modification, such as low bed, signage and supportive, nonskid footwear.  Adjust safety measures to individual developmental age, stage and identified risk factors.  Reinforce the importance of safety and physical activity with patient and family.  Perform regular intentional rounding to assess need for position change, pain assessment and personal needs, including assistance with toileting.  Intervention: Prevent Skin Injury  Description: Perform a screening for skin injury risk, such as pressure or moisture associated skin damage on admission and at regular intervals throughout hospital stay.  Keep all areas of skin (especially folds) clean and dry.  Maintain adequate skin hydration.  Relieve and redistribute pressure and protect bony prominences; implement measures based on patient-specific risk factors.  Match turning and repositioning schedule to clinical condition.  Encourage weight shift frequently; assist with reposition if unable to complete independently.  Float heels off bed; avoid pressure on the Achilles tendon.  Keep skin free from extended contact with medical devices.  Encourage functional activity and mobility, as early as tolerated.  Use aids (e.g., slide boards, mechanical lift)  during transfer.  Intervention: Prevent and Manage VTE (Venous Thromboembolism) Risk  Description: Assess for VTE (venous thromboembolism) risk.  Encourage and assist with early ambulation.  Initiate and maintain compression or other therapy, as indicated, based on identified risk in accordance with organizational protocol and provider order.  Encourage both active and passive leg exercises while in bed, if unable to ambulate.  Intervention: Prevent Infection  Description: Maintain skin and mucous membrane integrity; promote hand, oral and pulmonary hygiene.  Optimize fluid balance, nutrition, sleep and glycemic control to maximize infection resistance.  Identify potential sources of infection early to prevent or mitigate progression of infection (e.g., wound, lines, devices).  Evaluate ongoing need for invasive devices; remove promptly when no longer indicated.  Goal: Optimal Comfort and Wellbeing  Outcome: Ongoing, Progressing  Intervention: Provide Person-Centered Care  Description: Use a family-focused approach to care.  Develop trust and rapport by proactively providing information, encouraging questions, addressing concerns and offering reassurance.  Acknowledge emotional response to hospitalization.  Recognize and utilize personal coping strategies.  Honor spiritual and cultural preferences.  Goal: Readiness for Transition of Care  Outcome: Ongoing, Progressing

## 2022-06-10 NOTE — PLAN OF CARE
Problem: Adult Inpatient Plan of Care  Goal: Plan of Care Review  Outcome: Ongoing, Progressing  Flowsheets  Taken 6/10/2022 1253  Plan of Care Reviewed With: patient  Outcome Evaluation: Pt is 56 yo male admitted for right side numbness. Pt s/p tPA on 6/9/22.  MRI brain shwoing acute/subacute infarct within left thalamus.  Repeat CT scan showing evolving infarct left thalamus.  Pt presents with continued impaired sensation right UE/LE and right side face/head.  Pt's strength, gross and fine motor coordination, cognition, and vision all WFL.  Pt educated on CVA signs/symptoms and provided written handout on BE FAST and types of CVAs.  Pt able to perform bed mobility/transfers with indep and ambulate 450 ft with SUP.  Pt exhibiting one episode of lateral sway but no signs of LOB or safety concerns noted with functional mobility.  Pt has strong support of wife and family.  Pt can be up with nursing staff or wife.  No further inpatient PT needs.  Plan home with wife.

## 2022-06-10 NOTE — CASE MANAGEMENT/SOCIAL WORK
Transportation Services  Private: Car (with family)    Final Discharge Disposition Code: 01 - home or self-care

## 2022-06-10 NOTE — PROGRESS NOTES
LOS: 1 day     Glenroy Penaloza is a 57 y.o. male     Chief Complaint:  Follow up for stroke, s/p tPA       SUBJECTIVE:  History taken from: patient chart family RN    Interval History:  The patient is a 57 yr old gentleman with DM II who presented to [facility] secondary to sensory deficits on the right side of body.  He presented to ER of [facility].  The patient was within the window period to receive tPA. It was thought that he was having an acute stroke.  He received tPA which was well tolerated.  He continues to have some numbness.  He denies double vision, focal weakness, bowel and bladder problems.           Patient Complaints: none.       Review of Systems   Review of Systems   Review of Systems   Constitutional: Negative  HENT: Negative.    Eyes: Negative.    Respiratory: Negative.    Cardiovascular: Negative.    Gastrointestinal: Negative.    Genitourinary: Negative.    Musculoskeletal: Negative  Skin: Negative.    Neurological: stroke.    Hematological: Negative.    Psychiatric/Behavioral: Negative.      Pertinent PMH:  has a past medical history of Atypical rash, Diabetes mellitus, type 2 (HCC), Disorder of male genital organs, Elevated glucose, Essential hypertension (07/30/2020), Glucosuria, and Hyperlipidemia.   ________________________________________________     OBJECTIVE:  The patient is laying in bed in no apparent distress.  Head NC, AT,  Neck supple, trachea midline.  Lungs CTA,  Good pulmonary effort.  CV  S1-S2 no murmur. Abdomen soft, non tender. Ext no edema, no cyanosis.       Neurologic Exam    The patient is awake, alert, oriented to person, place and time.  Speech is fluent with good comprehension, follow commands. CN EOMI, no facial droop. Tongue midline.  Motor 5/5.  Sensory decreased to light touch on right arm and leg.  Reflexes ++, plantar mute. Cerebellum finger to nose intact.     ________________________________________________   RESULTS REVIEW    VITAL SIGNS:  Temp:  [96.4 °F (35.8 °C)-98.1  °F (36.7 °C)] 96.4 °F (35.8 °C)  Heart Rate:  [50-91] 68  Resp:  [15-19] 19  BP: (122-163)/(75-99) 146/80    LABS:   Lab Results   Component Value Date    WBC 5.50 06/10/2022    HGB 14.4 06/10/2022    HCT 40.4 06/10/2022    MCV 89.0 06/10/2022     06/10/2022     Lab Results   Component Value Date    GLUCOSE 221 (H) 06/10/2022    BUN 15 06/10/2022    CREATININE 0.89 06/10/2022    EGFRIFNONA 70 01/22/2022    BCR 16.9 06/10/2022    K 4.1 06/10/2022    CO2 27.0 06/10/2022    CALCIUM 8.5 (L) 06/10/2022    ALBUMIN 3.60 06/10/2022    LABIL2 1.5 02/20/2021    AST 48 (H) 06/10/2022    ALT 55 (H) 06/10/2022       Lab Results   Component Value Date    TSH 2.030 06/09/2022     (H) 06/10/2022    HGBA1C 9.5 (H) 06/10/2022    [id number] 532 06/09/2022         IMAGING STUDIES:  CT Head Without Contrast    Result Date: 6/10/2022  1.  Evolving small recent infarct within the left thalamus. 2.  No acute intracranial hemorrhage. Electronically signed by:  Gordo Hallman DO  6/10/2022 5:21 AM    CT Angiogram Neck    Result Date: 6/9/2022  1. Patent major intracranial vasculature without findings of large vessel occlusion. 2. Patent carotid arteries with mild atherosclerotic calcification. No significant stenosis by NASCET criteria. 2. Patent vertebral arteries. 3. Incidental CT findings above.  I discussed the findings with Dr. Chatman at 8:12 AM on 06/09/2022.  Electronically Signed By-Christofer Rosario MD On:6/9/2022 8:28 AM This report was finalized on [id number] by  Christofer Rosario MD.    MRI Brain Without Contrast    Result Date: 6/9/2022  FINDINGS consistent with 9 x 3 mm acute or subacute infarct within the left thalamus.   Electronically Signed By-Ailyn Farfan MD On:6/9/2022 4:43 PM This report was finalized on [id number] by  Ailyn Farfan MD.    XR Chest 1 View    Result Date: 6/9/2022  No acute chest findings.  Electronically Signed By-Ailyn Farfan MD On:6/9/2022 8:31 AM This report was finalized on  [id number] by  Ailyn Farfan MD.    CT Head Without Contrast Stroke Protocol    Result Date: 6/9/2022  IMPRESSION : 1. No intracranial hemorrhage. 2. Mild white matter findings most compatible with chronic microvascular disease.  Electronically Signed By-Christofer Rosario MD On:6/9/2022 8:08 AM This report was finalized on [id number] by  Christofer Rosario MD.    CT Angiogram Head w AI Analysis of LVO    Result Date: 6/9/2022  1. Patent major intracranial vasculature without findings of large vessel occlusion. 2. Patent carotid arteries with mild atherosclerotic calcification. No significant stenosis by NASCET criteria. 2. Patent vertebral arteries. 3. Incidental CT findings above.  I discussed the findings with Dr. Chatman at 8:12 AM on 06/09/2022.  Electronically Signed By-Christofer Rosario MD On:6/9/2022 8:28 AM This report was finalized on [id number] by  Christofer Rosario MD.      I reviewed the patient's new clinical results.    ________________________________________________      PROBLEM LIST:    Acute ischemic thalamic stroke, left (HCC)    Diabetes mellitus, type 2 (HCC)    Essential hypertension    Hyperlipidemia    Numbness and tingling of right upper and lower extremity        Assessment & Plan   ASSESSMENT/PLAN:  The patient is a 57 yr old gentleman with multiple medical problems including DM II, HTN who had received tPA for acute stroke.  The MRI of Brain showed left thalamic stroke.  He continue to have some numbness on the right side of body.  He has been started on ASA.   Rec:  Will continue ASA and Atorvastatin.  Will continue current care.  Neuro status is stable.  Patient is signed off.  Please call for further assistance.   HTN, DM II as per primary care.      **Please refer to previous notes for further details and recommendations.     I discussed the patients findings and my recommendations with patient, family and nursing staff    Sharifa Olguin MD  06/10/22  13:12 EDT

## 2022-06-10 NOTE — THERAPY EVALUATION
Patient Name: Glenroy Penaloza  : 1965    MRN: [id number]                              Today's Date: 6/10/2022       Admit Date: 2022    Visit Dx:     ICD-10-CM ICD-9-CM   1. Acute CVA (cerebrovascular accident) (HCC)  I63.9 434.91     Patient Active Problem List   Diagnosis   • Diabetes mellitus, type 2 (HCC)   • Essential hypertension   • Hyperlipidemia   • Hypermetropia   • Encounter for screening for malignant neoplasm of colon   • Numbness and tingling of right upper and lower extremity   • Acute ischemic thalamic stroke, left (HCC)     Past Medical History:   Diagnosis Date   • Atypical rash    • Diabetes mellitus, type 2 (HCC)    • Disorder of male genital organs    • Elevated glucose    • Essential hypertension 2020   • Glucosuria    • Hyperlipidemia      Past Surgical History:   Procedure Laterality Date   • COLONOSCOPY N/A 2022    Procedure: COLONOSCOPY WITH POLYPECTOMY;  Surgeon: Arsen Payan MD;  Location: [facility] ENDOSCOPY;  Service: Gastroenterology;  Laterality: N/A;  COLON POLYP, HEMORRHOIDS      General Information     Row Name 06/10/22 1017          OT Time and Intention    Document Type evaluation  -ES     Mode of Treatment occupational therapy  -ES     Row Name 06/10/22 1017          General Information    Prior Level of Function independent:;ADL's;driving;work  -ES     Row Name 06/10/22 1017          Occupational Profile    Reason for Services/Referral (Occupational Profile) Pt is a 58 yo male admitted with  -ES           User Key  (r) = Recorded By, (t) = Taken By, (c) = Cosigned By    Initials Name Provider Type    ES Solange Baxter OT Occupational Therapist                 Mobility/ADL's     Row Name 06/10/22 1047          Bed Mobility    Bed Mobility bed mobility (all) activities  -ES     All Activities, [place] (Bed Mobility) independent  -ES     Row Name 06/10/22 1047          Transfers    Transfers bed-chair transfer;sit-stand transfer;stand-sit transfer   -ES     Bed-Chair [place] (Transfers) independent  -ES     Sit-Stand [place] (Transfers) independent  -ES     Stand-Sit [place] (Transfers) independent  -ES     Row Name 06/10/22 1047          Functional Mobility    Functional Mobility- Ind. Level conditional independence  -ES     Functional Mobility- Comment Pt independent, but demos impaired dynamic balance. Reliant on compensatory strategies and increased time to complete.  -ES     Row Name 06/10/22 1047          Activities of Daily Living    BADL Assessment/Intervention upper body dressing;lower body dressing;toileting  -ES     Row Name 06/10/22 1047          Upper Body Dressing Assessment/Training    [place] Level (Upper Body Dressing) modified independence  -ES     Comment, (Upper Body Dressing) increased time to complete  -ES     Row Name 06/10/22 1047          Lower Body Dressing Assessment/Training    [place] Level (Lower Body Dressing) standby assist  -ES     Row Name 06/10/22 1047          Toileting Assessment/Training    Comment, (Toileting) independent with urinal  -ES           User Key  (r) = Recorded By, (t) = Taken By, (c) = Cosigned By    Initials Name Provider Type    ES Solange Baxter OT Occupational Therapist               Obj/Interventions     Row Name 06/10/22 1050          Sensory Assessment (Somatosensory)    Sensory Assessment (Somatosensory) left-sided sensation intact;right UE;right LE  -ES     Right UE Sensory Assessment proprioception;stereognosis;sharp-dull discrimination;temperature recognition;impaired  -ES     Right LE Sensory Assessment proprioception;impaired  -ES     Sensory Assessment Pt FMC intact, but difficulty with sensation. Oriented to mild temperature changes, but appears to rely on LUE and contextual cues. Patient able to identify quarter and piece of straw via steriognosis, but not dime.  -ES     Row Name 06/10/22 1050          Vision Assessment/Intervention    Visual Impairment/Limitations  WNL  -ES     Row Name 06/10/22 1050          Range of Motion Comprehensive    General Range of Motion no range of motion deficits identified  -ES     Row Name 06/10/22 1050          Strength Comprehensive (MMT)    General Manual Muscle Testing (MMT) Assessment no strength deficits identified  -ES     Row Name 06/10/22 1050          Balance    Balance Assessment sitting static balance;sitting dynamic balance;standing static balance;standing dynamic balance  -ES     Static Sitting Balance independent  -ES     Dynamic Sitting Balance independent  -ES     Static Standing Balance independent  -ES     Dynamic Standing Balance standby assist;contact guard  -ES     Balance Interventions standing;sitting;static;dynamic  -ES     Comment, Balance Pt with difficulty with higher level balance challenges, such as ambulating backward or retrieving item from floor.  -ES           User Key  (r) = Recorded By, (t) = Taken By, (c) = Cosigned By    Initials Name Provider Type    Solange Brannon OT Occupational Therapist               Goals/Plan     Row Name 06/10/22 1057          Bathing Goal 1 (OT)    Activity/Device (Bathing Goal 1, OT) bathing skills, all  -ES     [place] Level/Cues Needed (Bathing Goal 1, OT) independent  -ES     Time Frame (Bathing Goal 1, OT) 2 weeks  -ES     Row Name 06/10/22 1057          Problem Specific Goal 1 (OT)    Problem Specific Goal 1 (OT) Steriognosis 5/5 objects  -ES     Time Frame (Problem Specific Goal 1, OT) 2 weeks  -ES     Row Name 06/10/22 1057          Therapy Assessment/Plan (OT)    Planned Therapy Interventions (OT) functional balance retraining;occupation/activity based interventions;ROM/therapeutic exercise  -ES           User Key  (r) = Recorded By, (t) = Taken By, (c) = Cosigned By    Initials Name Provider Type    Solange Brannon OT Occupational Therapist               Clinical Impression     Row Name 06/10/22 1055          Pain Assessment    Pretreatment Pain Rating  0/10 - no pain  -ES     Posttreatment Pain Rating 0/10 - no pain  -ES     Row Name 06/10/22 1055          Plan of Care Review    Plan of Care Reviewed With patient  -ES     Outcome Evaluation Pt is a 58 yo male admitted with numbness and tingling of tongue, which progressed to RUE and RLE. CTH (-). MRI (+) subacute thalamic CVA. Pt with tPA 9:08 AM 6/9. At baseline, pt resides with spouse and one teenage son. 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 Reliant on compensatory strategies and increased time to complete higher level tasks, such as ambulating backward and retrieving item from floor. Pt ROM and MMT WFL in BUE. Pt FMC intact, but difficulty with sensation. Oriented to mild temperature changes, but appears to rely on LUE and contextual cues. Patient able to identify quarter and piece of straw via steriognosis, but not dime. Pt able to complete ADL tasks with incresed time and compensations. With his vocation, FMC, appropriate sensation, and temperature changes are imperative. Pt will require occupational therapy in OP setting at discharge.  -ES     Row Name 06/10/22 1059          Therapy Assessment/Plan (OT)    Rehab Potential (OT) good, to achieve stated therapy goals  -ES     Therapy Frequency (OT) 3 times/wk  -ES     Row Name 06/10/22 105          Therapy Plan Review/Discharge Plan (OT)    Anticipated Discharge Disposition (OT) home with outpatient therapy services  -ES     Row Name 06/10/22 1050          Vital Signs    O2 Delivery Pre Treatment room air  -ES     O2 Delivery Intra Treatment room air  -ES     O2 Delivery Post Treatment room air  -ES     Recovery Time VSS  -ES     Row Name 06/10/22 1050          Positioning and Restraints    Pre-Treatment Position in bed  -ES     Post Treatment Position chair  -ES     In Chair notified nsg;encouraged to call for assist  -ES           User  Key  (r) = Recorded By, (t) = Taken By, (c) = Cosigned By    Initials Name Provider Type    Solange Brannon OT Occupational Therapist               Outcome Measures     Row Name 06/10/22 1058          How much help from another is currently needed...    Putting on and taking off regular lower body clothing? 4  -ES     Bathing (including washing, rinsing, and drying) 4  -ES     Toileting (which includes using toilet bed pan or urinal) 4  -ES     Putting on and taking off regular upper body clothing 4  -ES     Taking care of personal grooming (such as brushing teeth) 4  -ES     Eating meals 4  -ES     AM-PAC 6 Clicks Score (OT) 24  -ES     Row Name 06/10/22 1058          Functional Assessment    Outcome Measure Options AM-PAC 6 Clicks Daily Activity (OT)  -ES           User Key  (r) = Recorded By, (t) = Taken By, (c) = Cosigned By    Initials Name Provider Type    Solange Brannon OT Occupational Therapist                Occupational Therapy Education                 Title: PT OT SLP Therapies (In Progress)     Topic: Occupational Therapy (In Progress)     Point: ADL training (Done)     Description:   Instruct learner(s) on proper safety adaptation and remediation techniques during self care or transfers.   Instruct in proper use of assistive devices.              Learning Progress Summary           Patient Acceptance, E,TB, VU by  at 6/10/2022 1059                   Point: Home exercise program (Not Started)     Description:   Instruct learner(s) on appropriate technique for monitoring, assisting and/or progressing therapeutic exercises/activities.              Learner Progress:  Not documented in this visit.          Point: Precautions (Done)     Description:   Instruct learner(s) on prescribed precautions during self-care and functional transfers.              Learning Progress Summary           Patient Acceptance, E,TB, VU by  at 6/10/2022 1059                   Point: Body mechanics (Done)      Description:   Instruct learner(s) on proper positioning and spine alignment during self-care, functional mobility activities and/or exercises.              Learning Progress Summary           Patient Acceptance, E,TB, VU by  at 6/10/2022 1059                               User Key     Initials Effective Dates Name Provider Type Discipline     06/16/21 -  Solange Baxter OT Occupational Therapist OT              OT Recommendation and Plan  Planned Therapy Interventions (OT): functional balance retraining, occupation/activity based interventions, ROM/therapeutic exercise  Therapy Frequency (OT): 3 times/wk  Plan of Care Review  Plan of Care Reviewed With: patient  Outcome Evaluation: Pt is a 56 yo male admitted with numbness and tingling of tongue, which progressed to RUE and RLE. CTH (-). MRI (+) subacute thalamic CVA. Pt with tPA 9:08 AM 6/9. At baseline, pt resides with spouse and one teenage son. 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 Reliant on compensatory strategies and increased time to complete higher level tasks, such as ambulating backward and retrieving item from floor. Pt ROM and MMT WFL in BUE. Pt FMC intact, but difficulty with sensation. Oriented to mild temperature changes, but appears to rely on LUE and contextual cues. Patient able to identify quarter and piece of straw via steriognosis, but not dime. Pt able to complete ADL tasks with incresed time and compensations. With his vocation, FMC, appropriate sensation, and temperature changes are imperative. Pt will require occupational therapy in OP setting at discharge.     Time Calculation:    Time Calculation- OT     Row Name 06/10/22 1059             Time Calculation- OT    OT Start Time 1015  -ES      OT Stop Time 1100  -ES      OT Time Calculation (min) 45 min  -ES      Total Timed Code Minutes- OT 0 minute(s)  -ES       OT Received On 06/10/22  -[facility]O      OT - Next Appointment 06/11/22  -[facility]O      OT Goal Re-Cert Due Date 06/24/22  -ES            User Key  (r) = Recorded By, (t) = Taken By, (c) = Cosigned By    Initials Name Provider Type    Solange Brannon OT Occupational Therapist              Therapy Charges for Today     Code Description Service Date Service Provider Modifiers Qty    [id number] HC OT EVAL MOD COMPLEXITY 4 6/10/2022 Solange Baxter OT GO 1               Solange Baxter OT  6/10/2022

## 2022-06-10 NOTE — PROGRESS NOTES
ICU Daily Progress Note        Acute ischemic thalamic stroke, left (HCC)    Diabetes mellitus, type 2 (HCC)    Essential hypertension    Hyperlipidemia    Numbness and tingling of right upper and lower extremity        Assessment & Plan        57-year-old male presented with acute CVA new onset right-sided numbness occurred 10 minutes prior to arrival while he was driving     Past medical history of hypertension, hyperlipidemia, diabetes mellitus  On evaluation by the ER symptoms appear to be worsening CT head showed no intracranial hemorrhage mild white matter findings suggestive of chronic microvascular disease, CT angiogram head no significant stenosis patent vertebral arteries no large vessel occlusion with patent carotid arteries     Neurology was consulted tPA was given at 9:08 AM           Plan  Repeat CT scan this morning showed small evolving thalamic CVA  However neurologically patient improved he was ambulating this morning   No autonomic dysfunction  Aspirin after 24 hours  Blood pressure control  Glycemic control hemoglobin A1c was 9 we will start Lantus and consult endocrinology for outpatient follow-up             LOS: 1 day         Vital signs for last 24 hours:  Vitals:    06/10/22 0600 06/10/22 0608 06/10/22 0700 06/10/22 0800   BP:  122/85 143/86 146/80   Pulse:  73 74 68   Resp:       Temp:    97.7 °F (36.5 °C)   TempSrc:    Oral   SpO2:  97% 94% 95%   Weight: 95.7 kg (210 lb 15.7 oz)      Height:           Intake/Output last 3 shifts:  I/O last 3 completed shifts:  In: 1605.5 [P.O.:680; I.V.:925.5]  Out: 2525 [Urine:2525]  Intake/Output this shift:  I/O this shift:  In: 240 [P.O.:240]  Out: -     Vent settings for last 24 hours:       Hemodynamic parameters for last 24 hours:       Radiology  Imaging Results (Last 24 Hours)     Procedure Component Value Units Date/Time    CT Head Without Contrast [[id number]] Collected: 06/10/22 0719     Updated: 06/10/22 0722    Narrative:      EXAMINATION: CT  HEAD WO CONTRAST    DATE: 6/10/2022 6:16 AM     INDICATION: Headache. Status post TPA.     COMPARISON: CT head June 9, 2022     TECHNIQUE: Thin section noncontrast axial images were obtained through the head. Coronal reformatted images were created.  CT dose lowering techniques were used, to include: automated exposure control, adjustment for patient size, and or use of iterative   reconstruction    FINDINGS:    INTRACRANIAL:  No acute intracranial hemorrhage.  No acute territorial infarct.    Evolving small infarct in the left thalamus.  No significant mass effect. No hydrocephalus.      EXTRACRANIAL:  Globes and orbits are unremarkable.  Paranasal sinuses are essentially clear.  Mastoid air cells are clear.  Calvarium is intact.      Impression:      1.  Evolving small recent infarct within the left thalamus.  2.  No acute intracranial hemorrhage.                Electronically signed by:  Gordo Hallman DO    6/10/2022 5:21 AM    MRI Brain Without Contrast [[id number]] Collected: 06/09/22 1639     Updated: 06/09/22 1645    Narrative:      MRI BRAIN WO CONTRAST-     Date of Exam: 6/9/2022 4:10 PM     Indication: Neuro deficit, acute, stroke suspected; I63.9-Cerebral  infarction, unspecified     Comparison: CT angiography of the head and neck and noncontrast CT head  6/9/2022.     Technique: Multiplanar multisequence images of the brain were performed  without contrast according to routine brain MRI protocol.     FINDINGS:  Subtle 9 x 3 mm focus of restricted diffusion in the left thalamus is  consistent with recent ischemia. There is no hemorrhagic transformation.     Major vascular flow voids appear preserved. No mass lesion or mass  effect or midline shift. Normal ventricular configuration. No abnormal  pituitary enlargement. Imaged portion cervical spinal cord is normal. No  calvarial abnormality. Mild right maxillary sinus, as right sphenoid  sinus and bilateral ethmoid sinus mucosal thickening. Mastoid air  cells  are clear. Orbital structures are normal.          Impression:      FINDINGS consistent with 9 x 3 mm acute or subacute infarct within the  left thalamus.        Electronically Signed By-Ailyn Farfan MD On:6/9/2022 4:43 PM  This report was finalized on [id number] by  Ailyn Farfan MD.          Labs:  Results from last 7 days   Lab Units 06/10/22  0401   WBC 10*3/mm3 5.50   HEMOGLOBIN g/dL 14.4   HEMATOCRIT % 40.4   PLATELETS 10*3/mm3 142     Results from last 7 days   Lab Units 06/10/22  0401   SODIUM mmol/L 136   POTASSIUM mmol/L 4.1   CHLORIDE mmol/L 102   CO2 mmol/L 27.0   BUN mg/dL 15   CREATININE mg/dL 0.89   CALCIUM mg/dL 8.5*   BILIRUBIN mg/dL 0.5   ALK PHOS U/L 47   ALT (SGPT) U/L 55*   AST (SGOT) U/L 48*   GLUCOSE mg/dL 221*         Results from last 7 days   Lab Units 06/10/22  0401 06/09/22  0802   ALBUMIN g/dL 3.60 4.50     Results from last 7 days   Lab Units 06/09/22  0802   TROPONIN T ng/mL <0.010         Results from last 7 days   Lab Units 06/10/22  0401   MAGNESIUM mg/dL 1.7     Results from last 7 days   Lab Units 06/09/22  0802   INR  0.95   APTT seconds 24.9     Results from last 7 days   Lab Units 06/09/22  0802   TSH uIU/mL 2.030   FREE T4 ng/dL 1.09           Meds:   SCHEDULE  aspirin, 325 mg, Oral, Daily   Or  aspirin, 300 mg, Rectal, Daily  atorvastatin, 40 mg, Oral, Nightly  insulin lispro, 0-7 Units, Subcutaneous, 4x Daily With Meals & Nightly  lisinopril-hydroCHLOROthiazide (ZESTORETIC) 10-12.5 mg combo dose, , Oral, Q24H  pantoprazole, 40 mg, Intravenous, Q AM  sodium chloride, 10 mL, Intravenous, Q12H  sodium chloride, 10 mL, Intravenous, Q12H      Infusions     PRNs  •  acetaminophen **OR** acetaminophen  •  aluminum-magnesium hydroxide-simethicone  •  dextrose  •  dextrose  •  glucagon (human recombinant)  •  insulin lispro **AND** insulin lispro  •  labetalol  •  nitroglycerin  •  ondansetron **OR** ondansetron  •  sodium chloride  •  sodium chloride  •  sodium  chloride    Physical Exam:  Physical Exam    ROS  Review of Systems   Respiratory: Negative for cough, shortness of breath and stridor.          Critical Care Time greater than: 45 Minutes  Total time spent with patient greater than: 45 Minutes

## 2022-06-10 NOTE — PLAN OF CARE
Goal Outcome Evaluation:  Plan of Care Reviewed With: patient           Outcome Evaluation: Pt is a 58 yo male admitted with numbness and tingling of tongue, which progressed to RUE and RLE. CTH (-). MRI (+) subacute thalamic CVA. Pt with tPA 9:08 AM 6/9. At baseline, pt resides with spouse and one teenage son. 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 Reliant on compensatory strategies and increased time to complete higher level tasks, such as ambulating backward and retrieving item from floor. Pt ROM and MMT WFL in BUE. Pt FMC intact, but difficulty with sensation. Oriented to mild temperature changes, but appears to rely on LUE and contextual cues. Patient able to identify quarter and piece of straw via steriognosis, but not dime. Pt able to complete ADL tasks with incresed time and compensations. With his vocation, FMC, appropriate sensation, and temperature changes are imperative. Pt will require occupational therapy in OP setting at discharge.

## 2022-06-10 NOTE — OUTREACH NOTE
Prep Survey    Flowsheet Row Responses   [facility] facility patient discharged from? [facility]   Is LACE score < 7 ? Yes   Emergency Room discharge w/ pulse ox? No   Eligibility TCM   [facility]   Date of Admission 06/09/22   Date of Discharge 06/10/22   Discharge Disposition Home or Self Care   Discharge diagnosis Acute CVA, numbness/tingling RUE, DM   Does the patient have one of the following disease processes/diagnoses(primary or secondary)? Stroke (TIA)   Does the patient have Home health ordered? No   Is there a DME ordered? No   Prep survey completed? Yes          CHIARA HICKS - Registered Nurse

## 2022-06-13 ENCOUNTER — OFFICE VISIT (OUTPATIENT)
Dept: FAMILY MEDICINE CLINIC | Facility: CLINIC | Age: 57
End: 2022-06-13

## 2022-06-13 ENCOUNTER — TELEPHONE (OUTPATIENT)
Dept: NEUROLOGY | Facility: CLINIC | Age: 57
End: 2022-06-13

## 2022-06-13 ENCOUNTER — TRANSITIONAL CARE MANAGEMENT TELEPHONE ENCOUNTER (OUTPATIENT)
Dept: CALL CENTER | Facility: HOSPITAL | Age: 57
End: 2022-06-13

## 2022-06-13 VITALS
WEIGHT: 208 LBS | BODY MASS INDEX: 31.52 KG/M2 | DIASTOLIC BLOOD PRESSURE: 98 MMHG | HEIGHT: 68 IN | HEART RATE: 85 BPM | SYSTOLIC BLOOD PRESSURE: 150 MMHG | TEMPERATURE: 97.5 F | OXYGEN SATURATION: 95 %

## 2022-06-13 DIAGNOSIS — I63.81 ACUTE ISCHEMIC VBA THALAMIC STROKE, LEFT: Primary | ICD-10-CM

## 2022-06-13 DIAGNOSIS — E11.65 TYPE 2 DIABETES MELLITUS WITH HYPERGLYCEMIA, WITHOUT LONG-TERM CURRENT USE OF INSULIN: ICD-10-CM

## 2022-06-13 DIAGNOSIS — E78.2 MIXED HYPERLIPIDEMIA: ICD-10-CM

## 2022-06-13 DIAGNOSIS — R53.83 FATIGUE, UNSPECIFIED TYPE: ICD-10-CM

## 2022-06-13 DIAGNOSIS — R06.83 SNORING: ICD-10-CM

## 2022-06-13 PROCEDURE — 82728 ASSAY OF FERRITIN: CPT | Performed by: NURSE PRACTITIONER

## 2022-06-13 PROCEDURE — 99496 TRANSJ CARE MGMT HIGH F2F 7D: CPT | Performed by: FAMILY MEDICINE

## 2022-06-13 PROCEDURE — 80053 COMPREHEN METABOLIC PANEL: CPT | Performed by: NURSE PRACTITIONER

## 2022-06-13 PROCEDURE — 84466 ASSAY OF TRANSFERRIN: CPT | Performed by: NURSE PRACTITIONER

## 2022-06-13 PROCEDURE — 83540 ASSAY OF IRON: CPT | Performed by: NURSE PRACTITIONER

## 2022-06-13 PROCEDURE — 86015 ACTIN ANTIBODY EACH: CPT | Performed by: NURSE PRACTITIONER

## 2022-06-13 PROCEDURE — 80074 ACUTE HEPATITIS PANEL: CPT | Performed by: NURSE PRACTITIONER

## 2022-06-13 PROCEDURE — 85025 COMPLETE CBC W/AUTO DIFF WBC: CPT | Performed by: NURSE PRACTITIONER

## 2022-06-13 RX ORDER — ATORVASTATIN CALCIUM 40 MG/1
80 TABLET, FILM COATED ORAL NIGHTLY
Qty: 60 TABLET | Refills: 0
Start: 2022-06-13 | End: 2022-06-14 | Stop reason: SDUPTHER

## 2022-06-13 NOTE — OUTREACH NOTE
Call Center TCM Note    Flowsheet Row Responses   [facility] patient discharged from? [facility]   Does the patient have one of the following disease processes/diagnoses(primary or secondary)? Stroke (TIA)   TCM attempt successful? Yes   Call start time 1122   Call end time 1125   Discharge diagnosis Acute CVA, numbness/tingling RUE, DM   Does the patient have all medications ordered at discharge? Yes   Is the patient taking all medications as directed (includes completed medication regime)? Yes   Comments regarding appointments appt with neurology on 6/27   Does the patient have a primary care provider?  Yes   Does the patient have an appointment with their PCP within 7 days of discharge? Yes   Comments regarding PCP appt this afternoon with Dr. Pruitt at PCP office   Has the patient kept scheduled appointments due by today? N/A   Has home health visited the patient within 72 hours of discharge? N/A   Psychosocial issues? No   Does the patient require any assistance with activities of daily living such as eating, bathing, dressing, walking, etc.? Yes   Does the patient have any residual symptoms from stroke/TIA? Yes   Residual symptoms comments right sided numbness   Does the patient understand the diet ordered at discharge? Yes   Did the patient receive a copy of their discharge instructions? Yes   Nursing interventions Reviewed instructions with patient   What is the patient's perception of their health status since discharge? Improving   Nursing interventions Nurse provided patient education   Is the patient able to teach back FAST for Stroke? Yes   Is the patient/caregiver able to teach back signs and symptoms related to disease process for when to call PCP? Yes   Is the patient/caregiver able to teach back signs and symptoms related to disease process for when to call 911? Yes   Is the patient/caregiver able to teach back the hierarchy of who to call/visit for symptoms/problems? PCP, Specialist, Home health  nurse, Urgent Care, ED, 911 Yes   TCM call completed? Yes   Wrap up additional comments Says he is doing well but still has right sided numbness, going to see Dr. Pruitt this afternoon at PCP office for f/u appt.          Yue Montanez RN    6/13/2022, 11:26 EDT

## 2022-06-13 NOTE — PROGRESS NOTES
Venipuncture Blood Specimen Collection  Venipuncture performed in left arm  by Mary Lozano with good hemostasis. Patient tolerated the procedure well without complications.   06/13/22   Mary Lozano

## 2022-06-13 NOTE — TELEPHONE ENCOUNTER
PT CALLED TO SEE ABOUT GETTING HIS PHYSICAL THERAPY, OCCUPATIONAL THERAPY STARTED AS WELL AS GETTING FMLA AND DISABILITY PAPERWORK COMPLETED.    TOLD PT HE WILL NEED TO BE SEEN FIRST. PT'S APPT NOT UNTIL 6-27-22 AND PT WANTS TO GET STARTED ASAP. TOLD PT TO REACH OUT TO HIS PCP PROVIDER.

## 2022-06-13 NOTE — PROGRESS NOTES
Transitional Care Follow Up Visit  Subjective     Glenroy Penaloza is a 57 y.o. male who presents for a transitional care management visit.    Within 48 business hours after discharge our office contacted him via telephone to coordinate his care and needs.      I reviewed and discussed the details of that call along with the discharge summary, hospital problems, inpatient lab results, inpatient diagnostic studies, and consultation reports with Glenroy.     Current outpatient and discharge medications have been reconciled for the patient.  Reviewed by: Cesar Pruitt MD      Date of TCM Phone Call 6/10/2022   [facility]   Date of Admission 6/9/2022   Date of Discharge 6/10/2022   Discharge Disposition Home or Self Care     Risk for Readmission (LACE) Score: 6 (6/10/2022  6:02 AM)      Pt will follow-up with neurology 6/27/2022  Pt is doing better- pt still having some numbness  Pt has numbness on right side of body- strength is normal     Course During Hospital Stay:  Pt was recently admitted for thalmic stroke- pt received TPA.  Cardiac testing was negative.  Pt placed on aspirin and lovastatin 40 mg.     The following portions of the patient's history were reviewed and updated as appropriate:   He  has a past medical history of Atypical rash, Diabetes mellitus, type 2 (HCC), Disorder of male genital organs, Elevated glucose, Essential hypertension (07/30/2020), Glucosuria, Hyperlipidemia, and TIA (transient ischemic attack).  He does not have any pertinent problems on file.  He  has a past surgical history that includes Colonoscopy (N/A, 4/4/2022).  His family history includes Cancer in his father, maternal uncle, mother, and another family member.  He  reports that he has never smoked. He quit smokeless tobacco use about 10 months ago. He reports current alcohol use of about 10.0 standard drinks of alcohol per week. He reports that he does not use drugs.  Current Outpatient Medications   Medication Sig Dispense  Refill   • aspirin (aspirin) 81 MG EC tablet Take 1 tablet by mouth Daily for 30 days. 30 tablet 0   • atorvastatin (LIPITOR) 40 MG tablet Take 2 tablets by mouth Every Night for 30 days. 60 tablet 0   • glucose blood test strip 100 each by Other route Daily. Use as instructed Dx code: E11.65 100 each 2   • lisinopril-hydrochlorothiazide (PRINZIDE,ZESTORETIC) 10-12.5 MG per tablet Take 1 tablet by mouth Daily. 90 tablet 1   • metFORMIN ER (GLUCOPHAGE-XR) 500 MG 24 hr tablet Take 1,000 mg by mouth Daily With Breakfast.     • Microlet Lancets misc 1 each Daily. Dx code: E11.65 100 each 2   • empagliflozin (Jardiance) 25 MG tablet tablet Take 1 tablet by mouth Daily. 90 tablet 1   • metFORMIN ER (GLUCOPHAGE-XR) 500 MG 24 hr tablet Take 500 mg by mouth Daily With Dinner.       No current facility-administered medications for this visit.     Current Outpatient Medications on File Prior to Visit   Medication Sig   • aspirin (aspirin) 81 MG EC tablet Take 1 tablet by mouth Daily for 30 days.   • glucose blood test strip 100 each by Other route Daily. Use as instructed Dx code: E11.65   • lisinopril-hydrochlorothiazide (PRINZIDE,ZESTORETIC) 10-12.5 MG per tablet Take 1 tablet by mouth Daily.   • metFORMIN ER (GLUCOPHAGE-XR) 500 MG 24 hr tablet Take 1,000 mg by mouth Daily With Breakfast.   • Microlet Lancets misc 1 each Daily. Dx code: E11.65   • [DISCONTINUED] atorvastatin (LIPITOR) 40 MG tablet Take 1 tablet by mouth Every Night for 30 days.   • metFORMIN ER (GLUCOPHAGE-XR) 500 MG 24 hr tablet Take 500 mg by mouth Daily With Dinner.     No current facility-administered medications on file prior to visit.     He has No Known Allergies..    Review of Systems   Constitutional: Negative for fatigue and fever.   HENT: Negative for sore throat.    Eyes: Negative for visual disturbance.   Respiratory: Negative for cough, chest tightness, shortness of breath and wheezing.    Cardiovascular: Negative for chest pain, palpitations  and leg swelling.   Gastrointestinal: Negative for abdominal pain, diarrhea, nausea and vomiting.   Skin: Negative for rash.   Neurological: Negative for light-headedness and headaches.   Psychiatric/Behavioral: Negative for decreased concentration, dysphoric mood, sleep disturbance and suicidal ideas. The patient is not nervous/anxious.        Objective   Physical Exam  Vitals reviewed.   Constitutional:       Appearance: Normal appearance. He is well-developed.   HENT:      Head: Normocephalic and atraumatic.      Right Ear: External ear normal.      Left Ear: External ear normal.      Mouth/Throat:      Pharynx: No oropharyngeal exudate.   Eyes:      Conjunctiva/sclera: Conjunctivae normal.      Pupils: Pupils are equal, round, and reactive to light.   Cardiovascular:      Rate and Rhythm: Normal rate and regular rhythm.      Pulses: Normal pulses.      Heart sounds: Normal heart sounds. No murmur heard.    No friction rub. No gallop.   Pulmonary:      Effort: Pulmonary effort is normal.      Breath sounds: Normal breath sounds. No wheezing or rhonchi.   Abdominal:      General: Abdomen is flat. Bowel sounds are normal. There is no distension.      Palpations: Abdomen is soft. There is no mass.      Tenderness: There is no abdominal tenderness. There is no guarding or rebound.      Hernia: No hernia is present.   Musculoskeletal:         General: Normal range of motion.   Skin:     General: Skin is warm and dry.      Capillary Refill: Capillary refill takes less than 2 seconds.   Neurological:      General: No focal deficit present.      Mental Status: He is alert and oriented to person, place, and time.      Cranial Nerves: No cranial nerve deficit.   Psychiatric:         Mood and Affect: Mood and affect normal.         Behavior: Behavior normal.         Thought Content: Thought content normal.         Judgment: Judgment normal.         Assessment & Plan   Problems Addressed this Visit        Cardiac and  Vasculature    Hyperlipidemia    Relevant Medications    atorvastatin (LIPITOR) 40 MG tablet       Endocrine and Metabolic    Diabetes mellitus, type 2 (HCC)    Relevant Medications    empagliflozin (Jardiance) 25 MG tablet tablet       Neuro    Acute ischemic thalamic stroke, left (HCC) - Primary    Relevant Orders    Ambulatory Referral to Cardiology    Ambulatory Referral to Physical Therapy    Ambulatory Referral to Occupational Therapy      Other Visit Diagnoses     Snoring        Relevant Orders    Ambulatory Referral to Sleep Medicine    Fatigue, unspecified type        Relevant Orders    Ambulatory Referral to Sleep Medicine      Diagnoses       Codes Comments    Acute ischemic thalamic stroke, left (HCC)    -  Primary ICD-10-CM: I63.212, I63.22  ICD-9-CM: 434.91     Type 2 diabetes mellitus with hyperglycemia, without long-term current use of insulin ([facility])     ICD-10-CM: E11.65  ICD-9-CM: 250.00, 790.29     Mixed hyperlipidemia     ICD-10-CM: E78.2  ICD-9-CM: 272.2     Snoring     ICD-10-CM: R06.83  ICD-9-CM: 786.09     Fatigue, unspecified type     ICD-10-CM: R53.83  ICD-9-CM: 780.79           Counseled on diet and exercise   Pt will be off work until he sees neurology 6/27/2022  Last day of work was 6/9/2022  Pt had stroke on 6/9/2022  Pt will be off work from 6/9-6/27/22

## 2022-06-14 ENCOUNTER — TELEPHONE (OUTPATIENT)
Dept: FAMILY MEDICINE CLINIC | Facility: CLINIC | Age: 57
End: 2022-06-14

## 2022-06-14 DIAGNOSIS — E78.2 MIXED HYPERLIPIDEMIA: ICD-10-CM

## 2022-06-14 LAB
ALBUMIN SERPL-MCNC: 4.8 G/DL (ref 3.5–5.2)
ALBUMIN/GLOB SERPL: 1.7 G/DL
ALP SERPL-CCNC: 52 U/L (ref 39–117)
ALT SERPL W P-5'-P-CCNC: 80 U/L (ref 1–41)
ANION GAP SERPL CALCULATED.3IONS-SCNC: 11.1 MMOL/L (ref 5–15)
AST SERPL-CCNC: 66 U/L (ref 1–40)
BASOPHILS # BLD AUTO: 0.03 10*3/MM3 (ref 0–0.2)
BASOPHILS NFR BLD AUTO: 0.6 % (ref 0–1.5)
BILIRUB SERPL-MCNC: 0.4 MG/DL (ref 0–1.2)
BUN SERPL-MCNC: 20 MG/DL (ref 6–20)
BUN/CREAT SERPL: 17.9 (ref 7–25)
CALCIUM SPEC-SCNC: 10.2 MG/DL (ref 8.6–10.5)
CHLORIDE SERPL-SCNC: 100 MMOL/L (ref 98–107)
CO2 SERPL-SCNC: 25.9 MMOL/L (ref 22–29)
CREAT SERPL-MCNC: 1.12 MG/DL (ref 0.76–1.27)
DEPRECATED RDW RBC AUTO: 39.9 FL (ref 37–54)
EGFRCR SERPLBLD CKD-EPI 2021: 76.1 ML/MIN/1.73
EOSINOPHIL # BLD AUTO: 0.14 10*3/MM3 (ref 0–0.4)
EOSINOPHIL NFR BLD AUTO: 2.6 % (ref 0.3–6.2)
ERYTHROCYTE [DISTWIDTH] IN BLOOD BY AUTOMATED COUNT: 12.3 % (ref 12.3–15.4)
FERRITIN SERPL-MCNC: 883.2 NG/ML (ref 30–400)
GLOBULIN UR ELPH-MCNC: 2.9 GM/DL
GLUCOSE SERPL-MCNC: 197 MG/DL (ref 65–99)
HAV IGM SERPL QL IA: NORMAL
HBV CORE IGM SERPL QL IA: NORMAL
HBV SURFACE AG SERPL QL IA: NORMAL
HCT VFR BLD AUTO: 44.4 % (ref 37.5–51)
HCV AB SER DONR QL: NORMAL
HGB BLD-MCNC: 15.7 G/DL (ref 13–17.7)
IMM GRANULOCYTES # BLD AUTO: 0.01 10*3/MM3 (ref 0–0.05)
IMM GRANULOCYTES NFR BLD AUTO: 0.2 % (ref 0–0.5)
IRON 24H UR-MRATE: 97 MCG/DL (ref 59–158)
IRON SATN MFR SERPL: 24 % (ref 20–50)
LYMPHOCYTES # BLD AUTO: 2.28 10*3/MM3 (ref 0.7–3.1)
LYMPHOCYTES NFR BLD AUTO: 42.3 % (ref 19.6–45.3)
MCH RBC QN AUTO: 31.3 PG (ref 26.6–33)
MCHC RBC AUTO-ENTMCNC: 35.4 G/DL (ref 31.5–35.7)
MCV RBC AUTO: 88.6 FL (ref 79–97)
MONOCYTES # BLD AUTO: 0.58 10*3/MM3 (ref 0.1–0.9)
MONOCYTES NFR BLD AUTO: 10.8 % (ref 5–12)
NEUTROPHILS NFR BLD AUTO: 2.35 10*3/MM3 (ref 1.7–7)
NEUTROPHILS NFR BLD AUTO: 43.5 % (ref 42.7–76)
NRBC BLD AUTO-RTO: 0 /100 WBC (ref 0–0.2)
PLATELET # BLD AUTO: 199 10*3/MM3 (ref 140–450)
PMV BLD AUTO: 11.3 FL (ref 6–12)
POTASSIUM SERPL-SCNC: 4.4 MMOL/L (ref 3.5–5.2)
PROT SERPL-MCNC: 7.7 G/DL (ref 6–8.5)
RBC # BLD AUTO: 5.01 10*6/MM3 (ref 4.14–5.8)
SODIUM SERPL-SCNC: 137 MMOL/L (ref 136–145)
TIBC SERPL-MCNC: 401 MCG/DL (ref 298–536)
TRANSFERRIN SERPL-MCNC: 269 MG/DL (ref 200–360)
WBC NRBC COR # BLD: 5.39 10*3/MM3 (ref 3.4–10.8)

## 2022-06-14 RX ORDER — ATORVASTATIN CALCIUM 80 MG/1
80 TABLET, FILM COATED ORAL NIGHTLY
Qty: 30 TABLET | Refills: 5
Start: 2022-06-14 | End: 2022-07-07 | Stop reason: SDUPTHER

## 2022-06-14 NOTE — TELEPHONE ENCOUNTER
Pharm[facility]    Pharmacy representative name: MARKOS    Pharmacy representative phone number: [phone]    What medication are you calling in regards to: atorvastatin (LIPITOR) 40 MG tablet    Who is the provider that prescribed the medication: DR SOLOMON    Additional notes: PATIENTS MEDICATION DOSAGE AND DIRECTIONS HAVE BEEN CHANGED AND THE PHARMACY HAS NOT RECEIVED AN UPDATED SCRIPT. PLEASE SEND AN UPDATED SCRIPT TO PHARMACY.

## 2022-06-15 ENCOUNTER — OFFICE VISIT (OUTPATIENT)
Dept: SLEEP MEDICINE | Facility: HOSPITAL | Age: 57
End: 2022-06-15

## 2022-06-15 VITALS
OXYGEN SATURATION: 96 % | BODY MASS INDEX: 31.61 KG/M2 | SYSTOLIC BLOOD PRESSURE: 142 MMHG | HEART RATE: 79 BPM | DIASTOLIC BLOOD PRESSURE: 81 MMHG | HEIGHT: 68 IN | WEIGHT: 208.6 LBS

## 2022-06-15 DIAGNOSIS — E66.9 CLASS 1 OBESITY: ICD-10-CM

## 2022-06-15 DIAGNOSIS — G47.33 OSA (OBSTRUCTIVE SLEEP APNEA): ICD-10-CM

## 2022-06-15 DIAGNOSIS — G47.30 OBSERVED SLEEP APNEA: Primary | ICD-10-CM

## 2022-06-15 DIAGNOSIS — G47.8 NON-RESTORATIVE SLEEP: ICD-10-CM

## 2022-06-15 DIAGNOSIS — R06.83 SNORING: ICD-10-CM

## 2022-06-15 PROBLEM — E66.811 CLASS 1 OBESITY: Status: ACTIVE | Noted: 2022-06-15

## 2022-06-15 LAB — SMA IGG SER-ACNC: 6 UNITS (ref 0–19)

## 2022-06-15 PROCEDURE — G0463 HOSPITAL OUTPT CLINIC VISIT: HCPCS

## 2022-06-15 PROCEDURE — 99204 OFFICE O/P NEW MOD 45 MIN: CPT | Performed by: INTERNAL MEDICINE

## 2022-06-15 NOTE — TELEPHONE ENCOUNTER
[employer] called as they are trying to process patients claim. I let them know we do not see patient until 6-27-22

## 2022-06-15 NOTE — PROGRESS NOTES
[facility] [address]  [place]   [place] [address]  Phone: [phone]  Fax: [phone]      Glenroy Penaloza  [id number]   1965  57 y.o.  male      Referring physician/provider and PCP Rosie Vela APRN    Type of service: Initial Sleep Medicine Consult.  Date of service: 6/15/2022      Chief Complaint   Patient presents with   • Snoring   • Witnessed Apnea   • Fatigue   • Non-restorative Sleep   • Obesity   • Daytime Sleepiness       History of present illness;  Thank you for asking to see Glenroy Penaloza, 57 y.o.. The patient was seen today on 6/15/2022 at [facility].  The patient presents today with symptoms of snoring, non-restorative sleep and witnessed apneas. The symptoms are present for few years and they are persistent in nature.  The snoring is present in all positions and it is loud.  Has no history of prior sleep evaluation and sleep studies. Patient has no prior surgery namely tonsillectomy, nasal surgery and UPPP.     Recently he had developed numbness and was evaluated in the hospital got tPA for a small thalamic stroke and TIA.  He is on aspirin but is not had any weakness but only numbness    Patient gives the following sleep history.  Sleep schedule:  Bedtime: 9 PM  Wake time: 5 AM  Normally takes about 2 minutes to fall asleep  Average hours of sleep 8  Number of naps per day none  Symptoms   In addition to snoring, nonrestorative sleep and witnessed apneas patient gives the following associated symptoms.  Have you ever awakened gasping for breath, coughing, choking: Yes   Change in weight,  No   Morning headaches  No   Awaken with a sore throat or dry mouth  No   Leg jerking at night:  No   Crawly feeling/urge sensation to move in the legs: No   Teeth grinding:No   Have you ever awakened at night with a sour taste or burning sensation in your chest:  No   Do you have muscle weakness with laughing or anger or sleep paralysis:  No   Have you ever  "felt paralyzed while going to sleep or waking up:  No   Sleepwalking, nightmares, No   Nocturia (urination at night): 1 times per night  Memory Problem:No     MEDICAL CONDITIONS (PMH)  1. Hypertension  2. Diabetes mellitus  3. TIA    Social history:  Do you drive a commercial vehicle:  No   Shift work:  No   Tobacco use:  Yes chew tobacco  Alcohol use: 10 per week  Caffeinated drinks: 1  Occupation:  heavy equipment    Family Hx (your parents and siblings)  1. No family history of sleep apnea    Medications: reviewed    Review of systems:  Sleep: Positve for snoring,witnessed apnea and daytime excessive sleepiness with [place] Sleepiness Scale of Total score: 11   Kidney/ Bladder  Difficulty In Urination: negative  Bed Wetting: negative  Frequent Urination: negative  HEENT  Recurrent Nose Bleeds: negative  Ear pain: negative  Sores In Mouth: negative  Persistent Hoarseness: negative  Nasal Congestion: negative  Post Nasal Drip: negative  Musculoskeletal:  Neck Pain: negative  Temporomandibular Joint Pain: negative  General:  Fever: negative  Fatigue: positive  Cardiovascular:  Irregular Heartbeat: positive  Swollen Ankles Or Legs: negative  Respiratory:  Shortness Of Breath: negative  Wheezing: negative  Neuro/Paych:  Fainting Spells: negative  Dizziness: negative  Anxiety: negative  Depression: negative  Gastrointestinal:  Problem Swallowing: negative  Frequent Heartburn: negative  Abdominal Bloating: negative  Skin:  Rash: negative  Change In Nails: negative  Endocrine:  Excessive Thirst: negative  Always Too Cold: negative  Always Too Warm: negative  Hem/Lymphatic:  Swollen Glands: negative  Burses/ Bleeds Easily: negative    Physical exam:  CONSTITUTINONAL:  Vitals:    06/15/22 0900   BP: 142/81   Pulse: 79   SpO2: 96%   Weight: 94.6 kg (208 lb 9.6 oz)   Height: 172.7 cm (68\")    Body mass index is 31.72 kg/m².   HEAD: atraumatic, normocephalic   EYES:pupils are round, equal and reacting to light and " accommodation, conjunctiva normal  NOSE:no nasal septal defects, nasal passages are clear, no nasal polyps,   THROAT: tonsils are not enlarged, tongue normal size, oral airway Mallampati class 3  NECK:Neck Circumference: 16.5 inches, trachea is in the midline, thyroid not enlarged  RESPIRATORY SYSTEM: Breath sounds are normal, there are no wheezes  CARDIOVASULAR SYSTEM: Heart sounds are regular rhythm and normal rate, there are no murmurs or thrills, no edema  GASTROINTESTINAL: Soft and non-tender,liver not enlarged, no evidence of ascites  MUSCULOSKELETAL SYSTEM: Full range of motion's in all the 4 extremities, neck movement not restricted, temporomandibular joint movement normal and no tenderness  SKIN: Warm and moist, no cyanosis, no clubbing,  NEUROLOGICAL SYSTEM: Oriented x 3, no gross neurological defects, No speech defect, gait is normal  PSYCHIATRIC SYSTEM: Mood is normal, thought content is normal    Office notes from care team reviewed. Office note dated June 13, 2022,reviewed  Labs reviewed.  TSH Results:  TSH    TSH 7/15/21 6/9/22   TSH 1.270 2.030            Most Recent A1C    HGBA1C Most Recent 6/10/22   Hemoglobin A1C 9.5 (A)   (A) Abnormal value               Assessment and plan:  · Witnessed apneas,(R06.81) patient's symptoms and examination is consistent with sleep apnea (G47.30). I have talked to the patient about the signs and symptoms of sleep apnea. In addition, I have also discussed pathophysiology of sleep apnea.  I also discussed the complications of untreated sleep apnea including effects on hypertension, diabetes mellitus and nonrestorative sleep with hypersomnia which can increase risk for motor vehicle accidents.  Untreated sleep apnea is also a risk factor for development of atrial fibrillation, pulmonary hypertension and stroke.  Discussed in detail of various testing methods including home-based and lab based sleep studies.  Based on history and physical examination and other  comorbidities the most appropriate study is home sleep test.  The order for the sleep study is placed in [facility].  The test will be scheduled after approval from insurance. Treatment and management will be discussed after the test is completed.  Patient was given opportunity to ask questions and all the questions were answered.   · Snoring (R06.83), snoring is the sound created by turbulent airflow vibrating upper airway soft tissue due to limitation of inspiratory airflow. I have also discussed factors affecting snoring including sleep deprivation, sleeping on the back and alcohol ingestion. To minimize snoring, patient is advised to have adequate sleep, sleep on the side and avoid alcohol and sedative medications before bedtime  · Daytime excessive sleepiness .  It was assessed with [place] Sleepiness Scale of Total score: 11.  There are many causes for daytime excessive sleepiness including sleep depression, shiftwork syndrome, depression and other medical disorders including heart, kidney and liver failure.  The most serious cause of excessive sleepiness is due to neurological conditions like narcolepsy/cataplexy.  But the most common cause of excessive sleepiness is due to sleep apnea with frequent awakenings during sleep time.  I have discussed safety of driving and to remain vigilant while driving.  · Obesity , patient's BMI is Body mass index is 31.72 kg/m².. I have discussed the relationship between weight and sleep apnea.There is direct correlation between weight and severity of sleep apnea.  Weight reduction is encouraged, as it is going to reduce the severity of sleep apnea. I have also discussed with the patient diet and exercise to achieve ideal body weight.  · Hypertension,   Essential hypertension is highly correlated with sleep apnea. Treating sleep apnea will assist in good blood pressure control.  · TIA, on aspirin  · Diabetes mellitus    I have also discussed with the patient the following  • Sleep  hygiene: Maintaining a regular bedtime and wake time, not to watch television or work in bed, limit caffeine-containing beverages before bed time and avoid naps during the day  • Adequate amount of sleep.  Generally most people needs about 7 to 8 hours of sleep.    Return for 31 to 90 days after PAP setup with down load..  Patient's questions were answered      I once again thank you for asking me to see this patient in consultation and I have forwarded my opinion and treatment plan.  Please do not hesitate to call me if you have any questions.     Yumiko Hitchcock MD  Sleep Medicine  Medical Director, [facility]  6/15/2022 ,

## 2022-06-17 DIAGNOSIS — E78.5 HYPERLIPIDEMIA, UNSPECIFIED HYPERLIPIDEMIA TYPE: ICD-10-CM

## 2022-06-17 DIAGNOSIS — I10 ESSENTIAL HYPERTENSION: ICD-10-CM

## 2022-06-17 RX ORDER — LOVASTATIN 40 MG/1
TABLET ORAL
Qty: 90 TABLET | Refills: 1 | OUTPATIENT
Start: 2022-06-17

## 2022-06-17 RX ORDER — LISINOPRIL AND HYDROCHLOROTHIAZIDE 12.5; 1 MG/1; MG/1
TABLET ORAL
Qty: 90 TABLET | Refills: 0 | Status: SHIPPED | OUTPATIENT
Start: 2022-06-17 | End: 2022-10-05

## 2022-06-20 ENCOUNTER — OFFICE VISIT (OUTPATIENT)
Dept: CARDIOLOGY | Facility: CLINIC | Age: 57
End: 2022-06-20

## 2022-06-20 VITALS
HEIGHT: 68 IN | OXYGEN SATURATION: 98 % | BODY MASS INDEX: 30.62 KG/M2 | WEIGHT: 202 LBS | DIASTOLIC BLOOD PRESSURE: 76 MMHG | SYSTOLIC BLOOD PRESSURE: 118 MMHG | HEART RATE: 91 BPM

## 2022-06-20 DIAGNOSIS — I63.81 ACUTE ISCHEMIC VBA THALAMIC STROKE, LEFT: ICD-10-CM

## 2022-06-20 DIAGNOSIS — E78.5 HYPERLIPIDEMIA, UNSPECIFIED HYPERLIPIDEMIA TYPE: ICD-10-CM

## 2022-06-20 DIAGNOSIS — I10 ESSENTIAL HYPERTENSION: Primary | ICD-10-CM

## 2022-06-20 PROCEDURE — 99204 OFFICE O/P NEW MOD 45 MIN: CPT | Performed by: INTERNAL MEDICINE

## 2022-06-24 NOTE — PROGRESS NOTES
Subjective: History of left thalamic ischemic stroke    Patient ID: Glenroy Penaloza is a 57 y.o. male.    CHIEF COMPLAINT: Here for stroke follow-up visit    History of Present Illness Mr. Penaloza is a very pleasant 57-year-old  male with a BMI of 30.56 who presented with his wife for follow-up after CVA- left thalamic e ischemic stroke on 6/9/2022 status post tPA.  He was referred by [facility] for a stroke follow-up appointment.  Pt still has numbness on right side of body: He describes it as no burning or tingling but complete right sided decreased sensation from top of head to to right foot.     He has been referred to PT and OT and has not started these as of yet.  He was seen in the sleep medicine clinic on 6/15/22 and is awaiting HST will be completed soon.  He is on 81 mg ASA  He was on atorvastatin 80 however had increased LFTs and was decreased to 40 mg by his PCP.  He states he does not smoke.  He is diabetic and should have a A1c less than 6.5, last reported was 9, needs tight control of A1c  He does not exercise and was courage to walk 20 minutes 3 times a week  He was encouraged to start a B12 supplement keep B12 level around 500  The patient's blood pressure was good today at 125/82.    He is anxious to return to work and was notified that this date will be managed by his primary care pending his results from PT and OT.      Testing:  MRI brain showed left thalamic infarct.  CTA of head and neck showing patent major vessels, mild bilateral atherosclerosis of carotid arteries.     EXAMINATION: CT HEAD WO CONTRAST   DATE: 6/10/2022 6:16 AM   INDICATION: Headache. Status post TPA.   COMPARISON: CT head June 9, 2022   IMPRESSION:  1.  Evolving small recent infarct within the left thalamus.  2.  No acute intracranial hemorrhage.   Electronically signed by:  Gordo Hallman,        MRI BRAIN WO CONTRAST-   Date of Exam: 6/9/2022 4:10 PM   Indication: Neuro deficit, acute, stroke suspected;  "I63.9-Cerebral  infarction, unspecified   Comparison: CT angiography of the head and neck and noncontrast CT head  6/9/2022.   IMPRESSION:  FINDINGS consistent with 9 x 3 mm acute or subacute infarct within the  left thalamus.   Electronically Signed By-Ailyn Farfan MD On:6/9/2022 4:43 PM  This report was finalized on [id number] by  Ailyn Farfan MD.        DATE OF EXAM:  6/9/2022 8:01 AM   PROCEDURE:  CT ANGIOGRAM HEAD W AI ANALYSIS OF LVO-, CT ANGIOGRAM NECK-   INDICATIONS:   Acute Stroke   COMPARISON:   Atheroscleotic heart disease   TECHNIQUE:  CTA of the head and CTA of the neck was performed after the intravenous  administration of Isovue 370. Reconstructed coronal and sagittal images  were also obtained. In addition, a 3 D volume rendered image was  obtained after post processing. Automated exposure control and iterative  reconstruction methods were used. AI analysis of LVO was utilized for  the TA Head imaging portion of the study.    IMPRESSION:  1. Patent major intracranial vasculature without findings of large  vessel occlusion.  2. Patent carotid arteries with mild atherosclerotic calcification. No  significant stenosis by NASCET criteria.  2. Patent vertebral arteries.  3. Incidental CT findings above.   I discussed the findings with Dr. Chatman at 8:12 AM on 06/09/2022.     Electronically Signed By-Christofer Rosario MD On:6/9/2022 8:28 AM    They were educated on the Stroke Acronym \"BE FAST\" B=Balance issues, E=Vision changes, F=Face weakness or numbness, A=Arm or Leg weakness or numbness, S=Speech problems and T=Time to call 911 he voiced understanding. They were  also given pamphlet outlining \"Be Fast\" as well as info on Stroke Support Group Meetings at [place].    The following portions of the patient's history were reviewed and updated as appropriate: allergies, current medications, past family history, past medical history, past social history, past surgical history and problem list.      Family " History   Problem Relation Age of Onset   • Cancer Mother    • Cancer Father    • Cancer Maternal Uncle    • Cancer Other    • Colon cancer Neg Hx    • Malig Hyperthermia Neg Hx        Past Medical History:   Diagnosis Date   • Atypical rash    • Diabetes mellitus, type 2 (HCC)    • Disorder of male genital organs    • Elevated glucose    • Essential hypertension 07/30/2020   • Glucosuria    • Hyperlipidemia    • Stroke (HCC) 6/9/22   • TIA (transient ischemic attack)        Social History     Socioeconomic History   • Marital status:    Tobacco Use   • Smoking status: Never Smoker   • Smokeless tobacco: Former User     Quit date: 8/1/2021   • Tobacco comment: Smokeless tobacco 40 years until 8/1/21   Vaping Use   • Vaping Use: Never used   Substance and Sexual Activity   • Alcohol use: Yes     Alcohol/week: 10.0 standard drinks     Types: 10 Cans of beer per week     Comment: current some day    • Drug use: Never   • Sexual activity: Yes     Partners: Female     Birth control/protection: None         Current Outpatient Medications:   •  aspirin (aspirin) 81 MG EC tablet, Take 1 tablet by mouth Daily for 30 days., Disp: 30 tablet, Rfl: 0  •  atorvastatin (LIPITOR) 80 MG tablet, Take 1 tablet by mouth Every Night for 30 days. (Patient taking differently: Take 40 mg by mouth Every Night.), Disp: 30 tablet, Rfl: 5  •  empagliflozin (Jardiance) 25 MG tablet tablet, Take 1 tablet by mouth Daily., Disp: 90 tablet, Rfl: 1  •  glucose blood test strip, 100 each by Other route Daily. Use as instructed Dx code: E11.65, Disp: 100 each, Rfl: 2  •  lisinopril-hydrochlorothiazide (PRINZIDE,ZESTORETIC) 10-12.5 MG per tablet, TAKE ONE TABLET BY MOUTH EVERY DAY **THIS MEDICATION HAS BEEN SHORT FILLED TO LINE UP WITH YOUR OTHER MEDICATIONS**, Disp: 90 tablet, Rfl: 0  •  metFORMIN ER (GLUCOPHAGE-XR) 500 MG 24 hr tablet, Take 1,000 mg by mouth Daily With Breakfast., Disp: , Rfl:   •  metFORMIN ER (GLUCOPHAGE-XR) 500 MG 24 hr  tablet, Take 500 mg by mouth Daily With Dinner., Disp: , Rfl:   •  Microlet Lancets misc, 1 each Daily. Dx code: E11.65, Disp: 100 each, Rfl: 2    Review of Systems   Constitutional: Negative.    HENT: Negative.    Eyes: Negative.    Respiratory: Negative.    Cardiovascular: Negative.    Gastrointestinal: Negative.    Genitourinary: Negative.    Musculoskeletal: Positive for gait problem.        I have reviewed ROS completed by medical assistant.     Objective:    Neurologic Exam     Mental Status   Oriented to person, place, and time.   Speech: speech is normal     Cranial Nerves     CN III, IV, VI   Pupils are equal, round, and reactive to light.    Gait, Coordination, and Reflexes     Gait  Gait: normal    Coordination   Finger to nose coordination: normal  Tandem walking coordination: normal    Reflexes   Right brachioradialis: 2+  Left brachioradialis: 2+  Right biceps: 2+  Left biceps: 2+  Right triceps: 2+  Left triceps: 2+  Right patellar: 3+  Left patellar: 2+  Right achilles: 2+  Left achilles: 2+      Physical Exam  Vitals and nursing note reviewed.   Constitutional:       Appearance: Normal appearance. He is well-developed and well-groomed.   HENT:      Head: Normocephalic.      Right Ear: Hearing normal.      Left Ear: Hearing normal.      Nose: Nose normal.      Mouth/Throat:      Lips: Pink.      Mouth: Mucous membranes are moist.   Eyes:      General: Lids are normal. No visual field deficit.     Extraocular Movements: Extraocular movements intact.      Pupils: Pupils are equal, round, and reactive to light.   Neck:      Vascular: No carotid bruit.   Cardiovascular:      Rate and Rhythm: Normal rate and regular rhythm.      Pulses: Normal pulses.      Heart sounds: Normal heart sounds, S1 normal and S2 normal.   Pulmonary:      Effort: Pulmonary effort is normal.      Breath sounds: Normal breath sounds and air entry.   Musculoskeletal:         General: Normal range of motion.      Cervical back:  "Normal, full passive range of motion without pain and normal range of motion.      Comments: Bilateral biceps triceps, wrist and hand flexors extensors 5/5  Bilateral hip flexors, distal leg, plantar and dorsiflexion of feet 5/5  All normal 5/5   Skin:     General: Skin is warm and dry.          Neurological:      General: No focal deficit present.      Mental Status: He is alert and oriented to person, place, and time.      Cranial Nerves: Cranial nerve deficit (Right facial decreased sensation) present. No dysarthria or facial asymmetry.      Sensory: Sensory deficit (See skin notation, right body decreased sensation) present.      Motor: Motor function is intact. No weakness, tremor, atrophy, abnormal muscle tone, seizure activity or pronator drift.      Coordination: Coordination is intact. Romberg sign negative. Coordination normal. Finger-Nose-Finger Test and Heel to Shin Test normal. Rapid alternating movements normal.      Gait: Gait is intact. Gait and tandem walk normal.      Deep Tendon Reflexes: Reflexes normal. Babinski sign absent on the right side. Babinski sign absent on the left side.      Reflex Scores:       Tricep reflexes are 2+ on the right side and 2+ on the left side.       Bicep reflexes are 2+ on the right side and 2+ on the left side.       Brachioradialis reflexes are 2+ on the right side and 2+ on the left side.       Patellar reflexes are 3+ on the right side and 2+ on the left side.       Achilles reflexes are 2+ on the right side and 2+ on the left side.  Psychiatric:         Attention and Perception: Attention normal.         Mood and Affect: Mood normal.         Speech: Speech normal.         Behavior: Behavior normal. Behavior is cooperative.         Thought Content: Thought content normal.         Assessment/Plan:    Discussion:   They were educated on the Stroke Acronym \"BE FAST\" B=Balance issues, E=Vision changes, F=Face weakness or numbness, A=Arm or Leg weakness or numbness, " "S=Speech problems and T=Time to call 911 he voiced understanding. They were also given a pamphlet outlining \"Be Fast\" as well as info on the Stroke Support Group Meetings at [facility].     Diagnoses and all orders for this visit:    1. H/O: CVA (cerebrovascular accident) (Primary)  Comments:  PCP to control: BP<130/90, A1c<6.5, LDL<70, B12>500, control weight, exercise 20 min TIW, PT, OT, continue ASA, Statin, healthy heart diet, control sleep apnea    Return to work determination per primary care after PT OT    Return if symptoms worsen or fail to improve.    I spent 40 minutes caring for Glenroy on this date of service. This time includes time spent by me in the following activities: reviewing tests, obtaining and/or reviewing a separately obtained history, performing a medically appropriate examination and/or evaluation, counseling and educating the patient/family/caregiver, referring and communicating with other health care professionals, documenting information in the medical record and independently interpreting results and communicating that information with the patient/family/caregiver.      This document has been electronically signed by Milagros SCHAFFER on June 27, 2022 10:04 EDT  "

## 2022-06-27 ENCOUNTER — OFFICE VISIT (OUTPATIENT)
Dept: NEUROLOGY | Facility: CLINIC | Age: 57
End: 2022-06-27

## 2022-06-27 VITALS
DIASTOLIC BLOOD PRESSURE: 82 MMHG | HEART RATE: 73 BPM | HEIGHT: 68 IN | BODY MASS INDEX: 30.46 KG/M2 | SYSTOLIC BLOOD PRESSURE: 125 MMHG | TEMPERATURE: 97.8 F | WEIGHT: 201 LBS

## 2022-06-27 DIAGNOSIS — Z86.73 H/O: CVA (CEREBROVASCULAR ACCIDENT): Primary | ICD-10-CM

## 2022-06-27 PROCEDURE — 99215 OFFICE O/P EST HI 40 MIN: CPT | Performed by: NURSE PRACTITIONER

## 2022-06-27 NOTE — PROGRESS NOTES
Cardiology Clinic Note  Ruben Cunningham MD, PhD    Subjective:     Encounter Date:06/20/2022      Patient ID: Glenroy Penaloza is a 57 y.o. male.    Chief Complaint:  Chief Complaint   Patient presents with   • Hypertension   • Diabetes   • Stroke   • Consult       HPI:    At the pleasure to see this very pleasant 57-year-old gentleman as a new patient today with history of acute ischemic vertebrobasilar thalamic stroke.  His weight is 202 pounds blood pressure is 118/76 and heart rates in the 90s.  He has diabetes hypertension hyperlipidemia.  His brother has a stent bypass surgery, EKG reveals sinus rhythm with normal conduction no ischemic segments, 2D echo during his hospitalization revealed an EF of 55 to 60% with normal myocardial thickness, grade 1 diastolic dysfunction, normal atrial sizes normal RV size and function no significant valvular abnormality, bubble study was negative.  We did discuss the vertebrobasilar strokes and thalamic strokes are usually secondary to poor hypertension, known cardioembolic etiology.  He voiced understanding.  We did discuss risk factor control with blood pressure less than 130 systolic, A1c less than 7, LDL less than 70, antiplatelet medicines for anticoagulation on board of which she has aspirin, high intensity statin, afterload reduction lisinopril HCTZ, diabetes medicines with metformin.  Normal cardiac structure and function, no evidence of CAD, no evidence of conduction abnormality  No history of any valve disease    Review of systems otherwise negative x14 point review of systems except as mentioned above    Historical data copied forward from previous encounters in EMR including the history, exam, and assessment/plan has been reviewed and is unchanged unless noted otherwise.    Cardiac medicines reviewed with risk, benefits, and necessity of each discussed.    Risk and benefit of cardiac testing reviewed including death heart attack stroke pain bleeding infection need for  "vascular /cardiovascular surgery were discussed and the patient     Objective:         /76 (BP Location: Left arm, Patient Position: Sitting, Cuff Size: Large Adult)   Pulse 91   Ht 172.7 cm (67.99\")   Wt 91.6 kg (202 lb)   SpO2 98%   BMI 30.72 kg/m²     Physical Exam  Regular rate and rhythm no rubs murmurs gallops  No heave no lift  No clubbing cyanosis or edema  Normal pulses  Oral cap refill  Intact grossly  Clear to auscultation  Soft nontender nondistended  No carotid bruits or JVD  Assessment:         Stroke  Thalamic location  Risk factor control, goal blood pressure less than 130 systolic, goal A1c less than 7, goal LDL less than 70  Continue present medications  Normal 2D echo  Negative bubble study with evidence of ASD or PFO or paradoxic embolic event  No history of thromboembolic event  No history of any CAD or unstable angina  Structurally normal heart otherwise    Risk factor control  Refer back to primary care  Back to cardiology in 6 months to 1 year    The pleasure to be involved in this patient's cardiovascular care.  Please call with any questions or concerns  Ruben Cunningham MD, PhD    Most recent EKG as reviewed and interpreted by me:  Procedures     Most recent echo as reviewed and interpreted by me:  Results for orders placed during the hospital encounter of 06/09/22    Adult Transthoracic Echo Complete W/ Cont if Necessary Per Protocol    Interpretation Summary  · Left ventricular systolic function is normal.  · Left ventricular ejection fraction is 55 to 60%.  · Left ventricular diastolic function is consistent with (grade I) impaired relaxation.  · Saline test results are negative.      Most recent stress test as reviewed and interpreted by me:      Most recent cardiac catheterization as reviewed interpreted by me:  No results found for this or any previous visit.    The following portions of the patient's history were reviewed and updated as appropriate: allergies, current " medications, past family history, past medical history, past social history, past surgical history and problem list.      ROS:  14 point review of systems negative except as mentioned above    Current Outpatient Medications:   •  aspirin (aspirin) 81 MG EC tablet, Take 1 tablet by mouth Daily for 30 days., Disp: 30 tablet, Rfl: 0  •  atorvastatin (LIPITOR) 80 MG tablet, Take 1 tablet by mouth Every Night for 30 days. (Patient taking differently: Take 40 mg by mouth Every Night.), Disp: 30 tablet, Rfl: 5  •  empagliflozin (Jardiance) 25 MG tablet tablet, Take 1 tablet by mouth Daily., Disp: 90 tablet, Rfl: 1  •  glucose blood test strip, 100 each by Other route Daily. Use as instructed Dx code: E11.65, Disp: 100 each, Rfl: 2  •  lisinopril-hydrochlorothiazide (PRINZIDE,ZESTORETIC) 10-12.5 MG per tablet, TAKE ONE TABLET BY MOUTH EVERY DAY **THIS MEDICATION HAS BEEN SHORT FILLED TO LINE UP WITH YOUR OTHER MEDICATIONS**, Disp: 90 tablet, Rfl: 0  •  metFORMIN ER (GLUCOPHAGE-XR) 500 MG 24 hr tablet, Take 1,000 mg by mouth Daily With Breakfast., Disp: , Rfl:   •  metFORMIN ER (GLUCOPHAGE-XR) 500 MG 24 hr tablet, Take 500 mg by mouth Daily With Dinner., Disp: , Rfl:   •  Microlet Lancets misc, 1 each Daily. Dx code: E11.65, Disp: 100 each, Rfl: 2    Problem List:  Patient Active Problem List   Diagnosis   • Diabetes mellitus, type 2 (HCC)   • Essential hypertension   • Hyperlipidemia   • Hypermetropia   • Encounter for screening for malignant neoplasm of colon   • Numbness and tingling of right upper and lower extremity   • Acute ischemic thalamic stroke, left (HCC)   • Observed sleep apnea   • Snoring   • Class 1 obesity   • Non-restorative sleep     Past Medical History:  Past Medical History:   Diagnosis Date   • Atypical rash    • Diabetes mellitus, type 2 (HCC)    • Disorder of male genital organs    • Elevated glucose    • Essential hypertension 07/30/2020   • Glucosuria    • Hyperlipidemia    • Stroke (HCC) 6/9/22    • TIA (transient ischemic attack)      Past Surgical History:  Past Surgical History:   Procedure Laterality Date   • COLONOSCOPY N/A 04/04/2022    Procedure: COLONOSCOPY WITH POLYPECTOMY;  Surgeon: Arsen Payan MD;  Location: [facility];  Service: Gastroenterology;  Laterality: N/A;  COLON POLYP, HEMORRHOIDS     Social History:  Social History     Socioeconomic History   • Marital status:    Tobacco Use   • Smoking status: Never Smoker   • Smokeless tobacco: Former User     Quit date: 8/1/2021   • Tobacco comment: Smokeless tobacco 40 years until 8/1/21   Vaping Use   • Vaping Use: Never used   Substance and Sexual Activity   • Alcohol use: Yes     Alcohol/week: 10.0 standard drinks     Types: 10 Cans of beer per week     Comment: current some day    • Drug use: Never   • Sexual activity: Yes     Partners: Female     Birth control/protection: None     Allergies:  No Known Allergies  Immunizations:    There is no immunization history on file for this patient.         In-Office Procedure(s):  No orders to display        ASCVD RIsk Score::  The ASCVD Risk score ([place] KAT Miller, et al., 2013) failed to calculate for the following reasons:    The patient has a prior MI or stroke diagnosis    Imaging:    Results for orders placed during the hospital encounter of 06/09/22    XR Chest 1 View    Narrative  DATE OF EXAM:  6/9/2022 8:20 AM    PROCEDURE:  XR CHEST 1 VW-    INDICATIONS:  Acute Stroke Protocol (onset < 12 hrs)    COMPARISON:  None    TECHNIQUE:  Single radiographic view of the chest was obtained.    FINDINGS:  The lungs are clear. Heart size is normal. There is no pleural effusion  or pneumothorax. There are no acute osseous abnormalities.    Impression  No acute chest findings.    Electronically Signed By-Ailyn Farfan MD On:6/9/2022 8:31 AM  This report was finalized on [id number] by  Ailyn Farfan MD.       Results for orders placed during the hospital encounter of 06/09/22    CT  Head Without Contrast    Narrative  EXAMINATION: CT HEAD WO CONTRAST    DATE: 6/10/2022 6:16 AM    INDICATION: Headache. Status post TPA.    COMPARISON: CT head June 9, 2022    TECHNIQUE: Thin section noncontrast axial images were obtained through the head. Coronal reformatted images were created.  CT dose lowering techniques were used, to include: automated exposure control, adjustment for patient size, and or use of iterative  reconstruction    FINDINGS:    INTRACRANIAL:  No acute intracranial hemorrhage.  No acute territorial infarct.    Evolving small infarct in the left thalamus.  No significant mass effect. No hydrocephalus.      EXTRACRANIAL:  Globes and orbits are unremarkable.  Paranasal sinuses are essentially clear.  Mastoid air cells are clear.  Calvarium is intact.    Impression  1.  Evolving small recent infarct within the left thalamus.  2.  No acute intracranial hemorrhage.                Electronically signed by:  Gordo Hallman DO  6/10/2022 5:21 AM      Results for orders placed during the hospital encounter of 06/09/22    CT Head Without Contrast    Narrative  EXAMINATION: CT HEAD WO CONTRAST    DATE: 6/10/2022 6:16 AM    INDICATION: Headache. Status post TPA.    COMPARISON: CT head June 9, 2022    TECHNIQUE: Thin section noncontrast axial images were obtained through the head. Coronal reformatted images were created.  CT dose lowering techniques were used, to include: automated exposure control, adjustment for patient size, and or use of iterative  reconstruction    FINDINGS:    INTRACRANIAL:  No acute intracranial hemorrhage.  No acute territorial infarct.    Evolving small infarct in the left thalamus.  No significant mass effect. No hydrocephalus.      EXTRACRANIAL:  Globes and orbits are unremarkable.  Paranasal sinuses are essentially clear.  Mastoid air cells are clear.  Calvarium is intact.    Impression  1.  Evolving small recent infarct within the left thalamus.  2.  No acute intracranial  hemorrhage.                Electronically signed by:  Gordo Hallman DO  6/10/2022 5:21 AM      Lab Review:   Admission on 06/09/2022, Discharged on 06/10/2022   Component Date Value   • Glucose 06/09/2022 208 (A)   • QT Interval 06/09/2022 403    • Glucose 06/09/2022 230 (A)   • BUN 06/09/2022 15    • Creatinine 06/09/2022 1.05    • Sodium 06/09/2022 138    • Potassium 06/09/2022 3.9    • Chloride 06/09/2022 100    • CO2 06/09/2022 24.0    • Calcium 06/09/2022 9.9    • Total Protein 06/09/2022 7.2    • Albumin 06/09/2022 4.50    • ALT (SGPT) 06/09/2022 73 (A)   • AST (SGOT) 06/09/2022 57 (A)   • Alkaline Phosphatase 06/09/2022 57    • Total Bilirubin 06/09/2022 0.4    • Globulin 06/09/2022 2.7    • A/G Ratio 06/09/2022 1.7    • BUN/Creatinine Ratio 06/09/2022 14.3    • Anion Gap 06/09/2022 14.0    • eGFR 06/09/2022 82.3    • Protime 06/09/2022 9.8    • INR 06/09/2022 0.95    • PTT 06/09/2022 24.9    • Troponin T 06/09/2022 <0.010    • ABO Type 06/09/2022 AB    • RH type 06/09/2022 Positive    • Antibody Screen 06/09/2022 Negative    • T&S Expiration Date 06/09/2022 6/12/2022 11:59:59 PM    • Extra Tube 06/09/2022 Hold for add-ons.    • Extra Tube 06/09/2022 hold for add-on    • Extra Tube 06/09/2022 Hold for add-ons.    • Extra Tube 06/09/2022 Hold for add-ons.    • WBC 06/09/2022 6.10    • RBC 06/09/2022 4.97    • Hemoglobin 06/09/2022 15.5    • Hematocrit 06/09/2022 44.5    • MCV 06/09/2022 89.4    • MCH 06/09/2022 31.2    • MCHC 06/09/2022 34.9    • RDW 06/09/2022 12.7    • RDW-SD 06/09/2022 39.8    • MPV 06/09/2022 8.6    • Platelets 06/09/2022 177    • Neutrophil % 06/09/2022 46.4    • Lymphocyte % 06/09/2022 41.0    • Monocyte % 06/09/2022 9.1    • Eosinophil % 06/09/2022 2.9    • Basophil % 06/09/2022 0.6    • Neutrophils, Absolute 06/09/2022 2.80    • Lymphocytes, Absolute 06/09/2022 2.50    • Monocytes, Absolute 06/09/2022 0.60    • Eosinophils, Absolute 06/09/2022 0.20    • Basophils, Absolute 06/09/2022  0.00    • nRBC 06/09/2022 0.1    • COVID19 06/09/2022 Not Detected    • Glucose 06/09/2022 215 (A)   • Target HR (85%) 06/09/2022 138    • Max. Pred. HR (100%) 06/09/2022 162    • ACS 06/09/2022 2.22    • Ao root diam 06/09/2022 3.4    • Ao pk adilson 06/09/2022 95.8    • Ao V2 VTI 06/09/2022 18.6    • CHRISTIANA(I,D) 06/09/2022 2.8    • EDV(cubed) 06/09/2022 89.8    • EDV(MOD-sp4) 06/09/2022 87.3    • EF(MOD-bp) 06/09/2022 56.0    • EF(MOD-sp4) 06/09/2022 56.0    • ESV(cubed) 06/09/2022 27.7    • ESV(MOD-sp4) 06/09/2022 38.4    • IVS/LVPW 06/09/2022 1.04    • LV mass(C)d 06/09/2022 187.4    • LV V1 max PG 06/09/2022 1.63    • LV V1 mean PG 06/09/2022 0.81    • LV V1 max 06/09/2022 63.8    • LVPWd 06/09/2022 1.14    • MR max PG 06/09/2022 52.1    • MV dec slope 06/09/2022 261.3    • MV dec time 06/09/2022 0.21    • MV V2 VTI 06/09/2022 20.9    • MVA(VTI) 06/09/2022 2.5    • PA V2 max 06/09/2022 74.0    • PI end-d adilson 06/09/2022 82.5    • Pulm A Revs Adilson 06/09/2022 23.6    • RAP systole 06/09/2022 3.0    • RV V1 max PG 06/09/2022 0.53    • RV V1 max 06/09/2022 36.5    • RV V1 VTI 06/09/2022 7.7    • RVIDd 06/09/2022 2.7    • RVSP(TR) 06/09/2022 21.0    • SV(LVOT) 06/09/2022 52.4    • SV(MOD-sp4) 06/09/2022 48.9    • TR max PG 06/09/2022 18.0    • Ao max PG 06/09/2022 3.7    • Ao mean PG 06/09/2022 2.14    • FS 06/09/2022 32.4    • IVSd 06/09/2022 1.18    • LA dimension (2D)  06/09/2022 3.6    • LV V1 VTI 06/09/2022 12.5    • LVIDd 06/09/2022 4.5    • LVIDs 06/09/2022 3.0    • LVOT area 06/09/2022 4.2    • LVOT diam 06/09/2022 2.31    • MV E/A 06/09/2022 0.95    • MV max PG 06/09/2022 1.61    • MV mean PG 06/09/2022 0.70    • Pulm S/D 06/09/2022 1.55    • MR max adilson 06/09/2022 360.9    • MV A max adilson 06/09/2022 55.4    • MV E max adilson 06/09/2022 52.7    • Pulm A Revs Dur 06/09/2022 0.11    • Pulm Taylor Adilson 06/09/2022 32.3    • Pulm Sys Adilson 06/09/2022 50.0    • TR max adilson 06/09/2022 209.0    • Folate 06/09/2022 >20.00    • Free T4  06/09/2022 1.09    • TSH 06/09/2022 2.030    • Vitamin B-12 06/09/2022 532    • Glucose 06/09/2022 189 (A)   • Glucose 06/09/2022 208 (A)   • Hemoglobin A1C 06/10/2022 9.5 (A)   • Total Cholesterol 06/10/2022 182    • Triglycerides 06/10/2022 206 (A)   • HDL Cholesterol 06/10/2022 43    • LDL Cholesterol  06/10/2022 103 (A)   • VLDL Cholesterol 06/10/2022 36    • LDL/HDL Ratio 06/10/2022 2.27    • Magnesium 06/10/2022 1.7    • Phosphorus 06/10/2022 2.9    • Glucose 06/10/2022 221 (A)   • BUN 06/10/2022 15    • Creatinine 06/10/2022 0.89    • Sodium 06/10/2022 136    • Potassium 06/10/2022 4.1    • Chloride 06/10/2022 102    • CO2 06/10/2022 27.0    • Calcium 06/10/2022 8.5 (A)   • Total Protein 06/10/2022 6.1    • Albumin 06/10/2022 3.60    • ALT (SGPT) 06/10/2022 55 (A)   • AST (SGOT) 06/10/2022 48 (A)   • Alkaline Phosphatase 06/10/2022 47    • Total Bilirubin 06/10/2022 0.5    • Globulin 06/10/2022 2.5    • A/G Ratio 06/10/2022 1.4    • BUN/Creatinine Ratio 06/10/2022 16.9    • Anion Gap 06/10/2022 7.0    • eGFR 06/10/2022 99.3    • WBC 06/10/2022 5.50    • RBC 06/10/2022 4.54    • Hemoglobin 06/10/2022 14.4    • Hematocrit 06/10/2022 40.4    • MCV 06/10/2022 89.0    • MCH 06/10/2022 31.7    • MCHC 06/10/2022 35.6    • RDW 06/10/2022 13.0    • RDW-SD 06/10/2022 40.7    • MPV 06/10/2022 7.8    • Platelets 06/10/2022 142    • Neutrophil % 06/10/2022 43.4    • Lymphocyte % 06/10/2022 43.5    • Monocyte % 06/10/2022 8.7    • Eosinophil % 06/10/2022 3.8    • Basophil % 06/10/2022 0.6    • Neutrophils, Absolute 06/10/2022 2.40    • Lymphocytes, Absolute 06/10/2022 2.40    • Monocytes, Absolute 06/10/2022 0.50    • Eosinophils, Absolute 06/10/2022 0.20    • Basophils, Absolute 06/10/2022 0.00    • nRBC 06/10/2022 0.0    • Glucose 06/10/2022 226 (A)   • Glucose 06/10/2022 230 (A)   • Glucose 06/10/2022 243 (A)   Admission on 04/04/2022, Discharged on 04/04/2022   Component Date Value   • Glucose 04/04/2022 222 (A)   •  Case Report 04/04/2022                      Value:Surgical Pathology Report                         Case: [id number]                                  Authorizing Provider:  Arsen Payan MD    Collected:           04/04/2022 11:19 AM          Ordering Location:     [facility] Received:            04/04/2022 11:46 AM                                 SUITES                                                                       Pathologist:           Rehan Tracey MD                                                            Specimen:    Large Intestine, Sigmoid Colon, SIGMOID COLON POLYP                                       • Clinical Information 04/04/2022     • Final Diagnosis 04/04/2022                      Value:This result contains rich text formatting which cannot be displayed here.   • Gross Description 04/04/2022                      Value:This result contains rich text formatting which cannot be displayed here.   • Microscopic Description 04/04/2022     Office Visit on 01/20/2022   Component Date Value   • Color, UA 01/22/2022 Dark Yellow (A)   • Appearance, UA 01/22/2022 Cloudy (A)   • pH, UA 01/22/2022 6.0    • Specific Gravity, UA 01/22/2022 >=1.030    • Glucose, UA 01/22/2022 >=1000 mg/dL (3+) (A)   • Ketones, UA 01/22/2022 Trace (A)   • Bilirubin, UA 01/22/2022 Negative    • Blood, UA 01/22/2022 Negative    • Protein, UA 01/22/2022 30 mg/dL (1+) (A)   • Leuk Esterase, UA 01/22/2022 Negative    • Nitrite, UA 01/22/2022 Negative    • Urobilinogen, UA 01/22/2022 0.2 E.U./dL    • Glucose 01/22/2022 227 (A)   • BUN 01/22/2022 15    • Creatinine 01/22/2022 1.08    • Sodium 01/22/2022 137    • Potassium 01/22/2022 4.5    • Chloride 01/22/2022 100    • CO2 01/22/2022 28.9    • Calcium 01/22/2022 9.5    • Total Protein 01/22/2022 7.6    • Albumin 01/22/2022 4.40    • ALT (SGPT) 01/22/2022 150 (A)   • AST (SGOT) 01/22/2022 118 (A)   • Alkaline Phosphatase 01/22/2022 55    • Total Bilirubin  01/22/2022 0.4    • eGFR Non African Amer 01/22/2022 70    • Globulin 01/22/2022 3.2    • A/G Ratio 01/22/2022 1.4    • BUN/Creatinine Ratio 01/22/2022 13.9    • Anion Gap 01/22/2022 8.1    • Hemoglobin A1C 01/22/2022 9.90 (A)   • Total Cholesterol 01/22/2022 194    • Triglycerides 01/22/2022 151 (A)   • HDL Cholesterol 01/22/2022 42    • LDL Cholesterol  01/22/2022 125 (A)   • VLDL Cholesterol 01/22/2022 27    • LDL/HDL Ratio 01/22/2022 2.90    • Hepatitis C Ab 01/22/2022 Non-Reactive    • WBC 01/22/2022 4.70    • RBC 01/22/2022 5.00    • Hemoglobin 01/22/2022 15.6    • Hematocrit 01/22/2022 45.9    • MCV 01/22/2022 91.8    • MCH 01/22/2022 31.2    • MCHC 01/22/2022 34.0    • RDW 01/22/2022 12.2 (A)   • RDW-SD 01/22/2022 40.6    • MPV 01/22/2022 11.2    • Platelets 01/22/2022 135 (A)   • Neutrophil % 01/22/2022 38.0 (A)   • Lymphocyte % 01/22/2022 45.7 (A)   • Monocyte % 01/22/2022 13.4 (A)   • Eosinophil % 01/22/2022 2.3    • Basophil % 01/22/2022 0.4    • Neutrophils, Absolute 01/22/2022 1.78    • Lymphocytes, Absolute 01/22/2022 2.15    • Monocytes, Absolute 01/22/2022 0.63    • Eosinophils, Absolute 01/22/2022 0.11    • Basophils, Absolute 01/22/2022 0.02    • RBC, UA 01/22/2022 None Seen    • WBC, UA 01/22/2022 0-2    • Bacteria, UA 01/22/2022 None Seen    • Squamous Epithelial Cell* 01/22/2022 0-2    • Hyaline Casts, UA 01/22/2022 None Seen    • Calcium Oxalate Crystals* 01/22/2022 Large/3+    • Methodology 01/22/2022 Manual Light Microscopy    • Glucose 06/13/2022 197 (A)   • BUN 06/13/2022 20    • Creatinine 06/13/2022 1.12    • Sodium 06/13/2022 137    • Potassium 06/13/2022 4.4    • Chloride 06/13/2022 100    • CO2 06/13/2022 25.9    • Calcium 06/13/2022 10.2    • Total Protein 06/13/2022 7.7    • Albumin 06/13/2022 4.80    • ALT (SGPT) 06/13/2022 80 (A)   • AST (SGOT) 06/13/2022 66 (A)   • Alkaline Phosphatase 06/13/2022 52    • Total Bilirubin 06/13/2022 0.4    • Globulin 06/13/2022 2.9    • A/G Ratio  06/13/2022 1.7    • BUN/Creatinine Ratio 06/13/2022 17.9    • Anion Gap 06/13/2022 11.1    • eGFR 06/13/2022 76.1    • Ferritin 06/13/2022 883.20 (A)   • Hepatitis B Surface Ag 06/13/2022 Non-Reactive    • Hep A IgM 06/13/2022 Non-Reactive    • Hep B C IgM 06/13/2022 Non-Reactive    • Hepatitis C Ab 06/13/2022 Non-Reactive    • Iron 06/13/2022 97    • Iron Saturation 06/13/2022 24    • Transferrin 06/13/2022 269    • TIBC 06/13/2022 401    • Smooth Muscle Ab 06/13/2022 6    • WBC 06/13/2022 5.39    • RBC 06/13/2022 5.01    • Hemoglobin 06/13/2022 15.7    • Hematocrit 06/13/2022 44.4    • MCV 06/13/2022 88.6    • MCH 06/13/2022 31.3    • MCHC 06/13/2022 35.4    • RDW 06/13/2022 12.3    • RDW-SD 06/13/2022 39.9    • MPV 06/13/2022 11.3    • Platelets 06/13/2022 199    • Neutrophil % 06/13/2022 43.5    • Lymphocyte % 06/13/2022 42.3    • Monocyte % 06/13/2022 10.8    • Eosinophil % 06/13/2022 2.6    • Basophil % 06/13/2022 0.6    • Immature Grans % 06/13/2022 0.2    • Neutrophils, Absolute 06/13/2022 2.35    • Lymphocytes, Absolute 06/13/2022 2.28    • Monocytes, Absolute 06/13/2022 0.58    • Eosinophils, Absolute 06/13/2022 0.14    • Basophils, Absolute 06/13/2022 0.03    • Immature Grans, Absolute 06/13/2022 0.01    • nRBC 06/13/2022 0.0      Recent labs reviewed and interpreted for clinical significance and application            Level of Care:           Ruben Cunningham MD  06/27/22  .

## 2022-07-01 RX ORDER — ATORVASTATIN CALCIUM 40 MG/1
TABLET, FILM COATED ORAL
Qty: 30 TABLET | Refills: 0 | OUTPATIENT
Start: 2022-07-01

## 2022-07-05 ENCOUNTER — TELEPHONE (OUTPATIENT)
Dept: FAMILY MEDICINE CLINIC | Facility: CLINIC | Age: 57
End: 2022-07-05

## 2022-07-05 RX ORDER — ASPIRIN 81 MG/1
TABLET, COATED ORAL
Qty: 30 TABLET | Refills: 0 | Status: SHIPPED | OUTPATIENT
Start: 2022-07-05 | End: 2022-07-12 | Stop reason: SDUPTHER

## 2022-07-07 DIAGNOSIS — E78.2 MIXED HYPERLIPIDEMIA: ICD-10-CM

## 2022-07-07 RX ORDER — ATORVASTATIN CALCIUM 40 MG/1
40 TABLET, FILM COATED ORAL NIGHTLY
Qty: 90 TABLET | Refills: 1
Start: 2022-07-07 | End: 2022-07-08 | Stop reason: SDUPTHER

## 2022-07-08 DIAGNOSIS — E78.2 MIXED HYPERLIPIDEMIA: ICD-10-CM

## 2022-07-08 RX ORDER — ATORVASTATIN CALCIUM 40 MG/1
40 TABLET, FILM COATED ORAL NIGHTLY
Qty: 90 TABLET | Refills: 1
Start: 2022-07-08 | End: 2022-08-07

## 2022-07-11 ENCOUNTER — HOSPITAL ENCOUNTER (OUTPATIENT)
Dept: SLEEP MEDICINE | Facility: HOSPITAL | Age: 57
Discharge: HOME | End: 2022-07-11
Admitting: INTERNAL MEDICINE

## 2022-07-11 DIAGNOSIS — G47.30 OBSERVED SLEEP APNEA: ICD-10-CM

## 2022-07-11 DIAGNOSIS — R06.83 SNORING: ICD-10-CM

## 2022-07-11 DIAGNOSIS — G47.8 NON-RESTORATIVE SLEEP: ICD-10-CM

## 2022-07-11 DIAGNOSIS — E66.9 CLASS 1 OBESITY: ICD-10-CM

## 2022-07-11 PROCEDURE — 95806 SLEEP STUDY UNATT&RESP EFFT: CPT

## 2022-07-11 PROCEDURE — 95806 SLEEP STUDY UNATT&RESP EFFT: CPT | Performed by: INTERNAL MEDICINE

## 2022-07-12 ENCOUNTER — OFFICE VISIT (OUTPATIENT)
Dept: FAMILY MEDICINE CLINIC | Facility: CLINIC | Age: 57
End: 2022-07-12

## 2022-07-12 VITALS
DIASTOLIC BLOOD PRESSURE: 78 MMHG | TEMPERATURE: 97.7 F | OXYGEN SATURATION: 98 % | SYSTOLIC BLOOD PRESSURE: 114 MMHG | HEART RATE: 91 BPM | WEIGHT: 200 LBS | BODY MASS INDEX: 30.41 KG/M2

## 2022-07-12 DIAGNOSIS — E11.65 TYPE 2 DIABETES MELLITUS WITH HYPERGLYCEMIA, WITHOUT LONG-TERM CURRENT USE OF INSULIN: ICD-10-CM

## 2022-07-12 DIAGNOSIS — I63.81 ACUTE ISCHEMIC VBA THALAMIC STROKE, LEFT: Primary | ICD-10-CM

## 2022-07-12 PROCEDURE — 99214 OFFICE O/P EST MOD 30 MIN: CPT | Performed by: FAMILY MEDICINE

## 2022-07-12 RX ORDER — ASPIRIN 81 MG/1
81 TABLET ORAL DAILY
Qty: 90 TABLET | Refills: 1 | Status: SHIPPED | OUTPATIENT
Start: 2022-07-12 | End: 2022-08-09 | Stop reason: SDUPTHER

## 2022-07-12 RX ORDER — METFORMIN HYDROCHLORIDE 500 MG/1
1000 TABLET, EXTENDED RELEASE ORAL 2 TIMES DAILY
Qty: 360 TABLET | Refills: 1 | Status: ON HOLD
Start: 2022-07-12 | End: 2023-01-15 | Stop reason: SDUPTHER

## 2022-07-12 NOTE — PROGRESS NOTES
Chief Complaint  Hypertension and Diabetes (FOLLOW UP)    Subjective          Glenroy Penaloza presents to [facility]  Pt is getting better from stroke but his stamina is still having a lot of problems- pt says that the numbness on right side of body has not changed- pt is still in PT/OT- he should complete this by end of July or beginning of August  Pt will need to be off work until at least the end of PT/OT  Pt is really watching diet and exercising- he wants to wait on any additional meds until 3 mos to see if this helps with diabetes before starting another med      Hypertension  This is a chronic problem. The current episode started more than 1 year ago. The problem has been gradually improving since onset. The problem is controlled. Pertinent negatives include no anxiety, blurred vision, chest pain, headaches, malaise/fatigue, neck pain, orthopnea, palpitations, peripheral edema, PND, shortness of breath or sweats. There are no associated agents to hypertension. Risk factors for coronary artery disease include diabetes mellitus, dyslipidemia, family history and male gender. Current antihypertension treatment includes ACE inhibitors and diuretics. The current treatment provides significant improvement. There are no compliance problems.    Diabetes  He presents for his follow-up diabetic visit. He has type 2 diabetes mellitus. His disease course has been stable. There are no hypoglycemic associated symptoms. Pertinent negatives for hypoglycemia include no headaches or sweats. Pertinent negatives for diabetes include no blurred vision, no chest pain, no fatigue, no foot paresthesias, no foot ulcerations, no polydipsia, no polyphagia, no polyuria, no visual change, no weakness and no weight loss. There are no hypoglycemic complications. Symptoms are stable. Current diabetic treatment includes oral agent (dual therapy). He is compliant with treatment all of the time. His weight is  decreasing steadily. He is following a generally healthy diet. He participates in exercise three times a week. An ACE inhibitor/angiotensin II receptor blocker is being taken.       Objective   No Known Allergies    There is no immunization history on file for this patient.  Past Medical History:   Diagnosis Date   • Atypical rash    • Diabetes mellitus, type 2 (HCC)    • Disorder of male genital organs    • Elevated glucose    • Essential hypertension 07/30/2020   • Glucosuria    • Hyperlipidemia    • Stroke (HCC) 6/9/22   • TIA (transient ischemic attack)       Past Surgical History:   Procedure Laterality Date   • COLONOSCOPY N/A 04/04/2022    Procedure: COLONOSCOPY WITH POLYPECTOMY;  Surgeon: Arsen Payan MD;  Location: [facility];  Service: Gastroenterology;  Laterality: N/A;  COLON POLYP, HEMORRHOIDS      Social History     Socioeconomic History   • Marital status:    Tobacco Use   • Smoking status: Never Smoker   • Smokeless tobacco: Former User     Quit date: 8/1/2021   • Tobacco comment: Smokeless tobacco 40 years until 8/1/21   Vaping Use   • Vaping Use: Never used   Substance and Sexual Activity   • Alcohol use: Yes     Alcohol/week: 10.0 standard drinks     Types: 10 Cans of beer per week     Comment: current some day    • Drug use: Never   • Sexual activity: Yes     Partners: Female     Birth control/protection: None        Current Outpatient Medications:   •  aspirin (Aspirin Low Dose) 81 MG EC tablet, Take 1 tablet by mouth Daily., Disp: 90 tablet, Rfl: 1  •  atorvastatin (LIPITOR) 40 MG tablet, Take 1 tablet by mouth Every Night for 30 days., Disp: 90 tablet, Rfl: 1  •  empagliflozin (Jardiance) 25 MG tablet tablet, Take 1 tablet by mouth Daily., Disp: 90 tablet, Rfl: 1  •  glucose blood test strip, 100 each by Other route Daily. Use as instructed Dx code: E11.65, Disp: 100 each, Rfl: 2  •  lisinopril-hydrochlorothiazide (PRINZIDE,ZESTORETIC) 10-12.5 MG per tablet, TAKE ONE  TABLET BY MOUTH EVERY DAY **THIS MEDICATION HAS BEEN SHORT FILLED TO LINE UP WITH YOUR OTHER MEDICATIONS**, Disp: 90 tablet, Rfl: 0  •  metFORMIN ER (GLUCOPHAGE-XR) 500 MG 24 hr tablet, Take 2 tablets by mouth 2 (Two) Times a Day., Disp: 360 tablet, Rfl: 1  •  Microlet Lancets misc, 1 each Daily. Dx code: E11.65, Disp: 100 each, Rfl: 2   Family History   Problem Relation Age of Onset   • Cancer Mother    • Cancer Father    • Cancer Maternal Uncle    • Cancer Other    • Colon cancer Neg Hx    • Malig Hyperthermia Neg Hx           Vital Signs:   Vitals:    07/12/22 0958   BP: 114/78   Pulse: 91   Temp: 97.7 °F (36.5 °C)   SpO2: 98%   Weight: 90.7 kg (200 lb)       Review of Systems   Constitutional: Negative for fatigue, malaise/fatigue and weight loss.   Eyes: Negative for blurred vision.   Respiratory: Negative for shortness of breath.    Cardiovascular: Negative for chest pain, palpitations, orthopnea and PND.   Endocrine: Negative for polydipsia, polyphagia and polyuria.   Musculoskeletal: Negative for neck pain.   Neurological: Negative for weakness and headaches.      Physical Exam  Vitals reviewed.   Constitutional:       Appearance: Normal appearance. He is well-developed.   HENT:      Head: Normocephalic and atraumatic.      Right Ear: External ear normal.      Left Ear: External ear normal.      Mouth/Throat:      Pharynx: No oropharyngeal exudate.   Eyes:      Conjunctiva/sclera: Conjunctivae normal.      Pupils: Pupils are equal, round, and reactive to light.   Cardiovascular:      Rate and Rhythm: Normal rate and regular rhythm.      Pulses: Normal pulses.      Heart sounds: Normal heart sounds. No murmur heard.    No friction rub. No gallop.   Pulmonary:      Effort: Pulmonary effort is normal.      Breath sounds: Normal breath sounds. No wheezing or rhonchi.   Abdominal:      General: Abdomen is flat. Bowel sounds are normal. There is no distension.      Palpations: Abdomen is soft. There is no mass.       Tenderness: There is no abdominal tenderness. There is no guarding or rebound.      Hernia: No hernia is present.   Musculoskeletal:         General: Normal range of motion.   Skin:     General: Skin is warm and dry.      Capillary Refill: Capillary refill takes less than 2 seconds.   Neurological:      Mental Status: He is alert and oriented to person, place, and time.      Cranial Nerves: No cranial nerve deficit.      Sensory: Sensory deficit present.      Motor: No weakness.      Coordination: Coordination normal.      Gait: Gait normal.   Psychiatric:         Mood and Affect: Mood and affect normal.         Behavior: Behavior normal.         Thought Content: Thought content normal.         Judgment: Judgment normal.        Result Review :   The following data was reviewed by: Cesar Pruitt MD on 07/12/2022:  CMP    CMP 6/9/22 6/10/22 6/13/22   Glucose 230 (A) 221 (A) 197 (A)   BUN 15 15 20   Creatinine 1.05 0.89 1.12   Sodium 138 136 137   Potassium 3.9 4.1 4.4   Chloride 100 102 100   Calcium 9.9 8.5 (A) 10.2   Albumin 4.50 3.60 4.80   Total Bilirubin 0.4 0.5 0.4   Alkaline Phosphatase 57 47 52   AST (SGOT) 57 (A) 48 (A) 66 (A)   ALT (SGPT) 73 (A) 55 (A) 80 (A)   (A) Abnormal value       Comments are available for some flowsheets but are not being displayed.           CBC    CBC 6/9/22 6/10/22 6/13/22   WBC 6.10 5.50 5.39   RBC 4.97 4.54 5.01   Hemoglobin 15.5 14.4 15.7   Hematocrit 44.5 40.4 44.4   MCV 89.4 89.0 88.6   MCH 31.2 31.7 31.3   MCHC 34.9 35.6 35.4   RDW 12.7 13.0 12.3   Platelets 177 142 199           Lipid Panel    Lipid Panel 1/22/22 6/10/22   Total Cholesterol 194 182   Triglycerides 151 (A) 206 (A)   HDL Cholesterol 42 43   VLDL Cholesterol 27 36   LDL Cholesterol  125 (A) 103 (A)   LDL/HDL Ratio 2.90 2.27   (A) Abnormal value            TSH    TSH 6/9/22   TSH 2.030           Most Recent A1C    HGBA1C Most Recent 6/10/22   Hemoglobin A1C 9.5 (A)   (A) Abnormal value                       Assessment and Plan    Diagnoses and all orders for this visit:    1. Acute ischemic thalamic stroke, left (HCC) (Primary)  -     aspirin (Aspirin Low Dose) 81 MG EC tablet; Take 1 tablet by mouth Daily.  Dispense: 90 tablet; Refill: 1    2. Type 2 diabetes mellitus with hyperglycemia, without long-term current use of insulin (HCC)  -     Hemoglobin A1c; Future  -     metFORMIN ER (GLUCOPHAGE-XR) 500 MG 24 hr tablet; Take 2 tablets by mouth 2 (Two) Times a Day.  Dispense: 360 tablet; Refill: 1            Follow Up   Return in about 1 month (around 8/12/2022) for Recheck.  Patient was given instructions and counseling regarding his condition or for health maintenance advice. Please see specific information pulled into the AVS if appropriate.       Answers for HPI/ROS submitted by the patient on 7/5/2022  What is the primary reason for your visit?: Other  Please describe your symptoms.: Requested follow up from last visit  Have you had these symptoms before?: No  How long have you been having these symptoms?: Greater than 2 weeks  Please list any medications you are currently taking for this condition.: The ones in My Chart

## 2022-07-13 DIAGNOSIS — R06.83 SNORING: ICD-10-CM

## 2022-07-13 DIAGNOSIS — G47.33 OSA (OBSTRUCTIVE SLEEP APNEA): Primary | ICD-10-CM

## 2022-07-15 ENCOUNTER — TELEPHONE (OUTPATIENT)
Dept: SLEEP MEDICINE | Facility: HOSPITAL | Age: 57
End: 2022-07-15

## 2022-07-15 NOTE — TELEPHONE ENCOUNTER
Called pt re: hst results and cpap machine.  Pt chose [facility] and is scheduled for compliance check appt on 11/14/22 at 9:00.

## 2022-07-21 LAB — QT INTERVAL: 403 MS

## 2022-07-30 ENCOUNTER — CLINICAL SUPPORT (OUTPATIENT)
Dept: FAMILY MEDICINE CLINIC | Facility: CLINIC | Age: 57
End: 2022-07-30

## 2022-07-30 DIAGNOSIS — E11.65 TYPE 2 DIABETES MELLITUS WITH HYPERGLYCEMIA, WITHOUT LONG-TERM CURRENT USE OF INSULIN: ICD-10-CM

## 2022-07-30 LAB — HBA1C MFR BLD: 7.4 % (ref 4.8–5.6)

## 2022-07-30 PROCEDURE — 83036 HEMOGLOBIN GLYCOSYLATED A1C: CPT | Performed by: FAMILY MEDICINE

## 2022-07-30 PROCEDURE — 36415 COLL VENOUS BLD VENIPUNCTURE: CPT | Performed by: NURSE PRACTITIONER

## 2022-07-30 NOTE — PROGRESS NOTES
Venipuncture Blood Specimen Collection  Venipuncture performed in left arm by Rosalva Askew with good hemostasis. Patient tolerated the procedure well without complications.   07/30/22   Rosalva Askew

## 2022-08-09 ENCOUNTER — OFFICE VISIT (OUTPATIENT)
Dept: FAMILY MEDICINE CLINIC | Facility: CLINIC | Age: 57
End: 2022-08-09

## 2022-08-09 VITALS
TEMPERATURE: 97.7 F | WEIGHT: 198 LBS | SYSTOLIC BLOOD PRESSURE: 130 MMHG | OXYGEN SATURATION: 97 % | HEART RATE: 83 BPM | DIASTOLIC BLOOD PRESSURE: 80 MMHG | BODY MASS INDEX: 30.11 KG/M2

## 2022-08-09 DIAGNOSIS — I63.81 ACUTE ISCHEMIC VBA THALAMIC STROKE, LEFT: ICD-10-CM

## 2022-08-09 DIAGNOSIS — I69.398 CVA, OLD, ALTERATIONS OF SENSATIONS: ICD-10-CM

## 2022-08-09 DIAGNOSIS — I10 ESSENTIAL HYPERTENSION: ICD-10-CM

## 2022-08-09 DIAGNOSIS — E11.65 TYPE 2 DIABETES MELLITUS WITH HYPERGLYCEMIA, WITHOUT LONG-TERM CURRENT USE OF INSULIN: Primary | ICD-10-CM

## 2022-08-09 DIAGNOSIS — R20.9 CVA, OLD, ALTERATIONS OF SENSATIONS: ICD-10-CM

## 2022-08-09 DIAGNOSIS — Z12.5 SCREENING PSA (PROSTATE SPECIFIC ANTIGEN): ICD-10-CM

## 2022-08-09 PROCEDURE — 99213 OFFICE O/P EST LOW 20 MIN: CPT | Performed by: FAMILY MEDICINE

## 2022-08-09 RX ORDER — ASPIRIN 81 MG/1
81 TABLET ORAL DAILY
Qty: 90 TABLET | Refills: 1 | Status: SHIPPED | OUTPATIENT
Start: 2022-08-09

## 2022-08-09 RX ORDER — ATORVASTATIN CALCIUM 40 MG/1
40 TABLET, FILM COATED ORAL DAILY
COMMUNITY
End: 2023-01-03 | Stop reason: SDUPTHER

## 2022-08-09 NOTE — PROGRESS NOTES
Chief Complaint  Stroke (Follow up on CVA)    Subjective          Glenroy Penaloza presents to [facility]  Pt has finished physical therapy for his recent stroke  He still has some numbness  Pt has started cpap for sleep apnea  Pt will be ready to go back to work, he feels, next Monday- pt is going to try to restart work- if he has any problems, then we will take pt back off work and refer him back to neurology to see if anything else can be done or if he should have further restrictions- pt is as close to baseline now as he will likely get    Diabetes is better controlled- hgm a1c has gone from 9.9 to 7.4      Objective   No Known Allergies    There is no immunization history on file for this patient.  Past Medical History:   Diagnosis Date   • Atypical rash    • Diabetes mellitus, type 2 (HCC)    • Disorder of male genital organs    • Elevated glucose    • Essential hypertension 07/30/2020   • Glucosuria    • Hyperlipidemia    • Stroke (HCC) 6/9/22   • TIA (transient ischemic attack)       Past Surgical History:   Procedure Laterality Date   • COLONOSCOPY N/A 04/04/2022    Procedure: COLONOSCOPY WITH POLYPECTOMY;  Surgeon: Arsen Payan MD;  Location: [facility] ENDOSCOPY;  Service: Gastroenterology;  Laterality: N/A;  COLON POLYP, HEMORRHOIDS      Social History     Socioeconomic History   • Marital status:    Tobacco Use   • Smoking status: Never Smoker   • Smokeless tobacco: Former User     Quit date: 8/1/2021   • Tobacco comment: Smokeless tobacco 40 years until 8/1/21   Vaping Use   • Vaping Use: Never used   Substance and Sexual Activity   • Alcohol use: Yes     Alcohol/week: 10.0 standard drinks     Types: 10 Cans of beer per week     Comment: current some day    • Drug use: Never   • Sexual activity: Yes     Partners: Female     Birth control/protection: None        Current Outpatient Medications:   •  aspirin (Aspirin Low Dose) 81 MG EC tablet, Take 1 tablet by mouth  Daily., Disp: 90 tablet, Rfl: 1  •  atorvastatin (LIPITOR) 40 MG tablet, Take 40 mg by mouth Daily., Disp: , Rfl:   •  empagliflozin (Jardiance) 25 MG tablet tablet, Take 1 tablet by mouth Daily., Disp: 90 tablet, Rfl: 1  •  glucose blood test strip, 100 each by Other route Daily. Use as instructed Dx code: E11.65, Disp: 100 each, Rfl: 2  •  lisinopril-hydrochlorothiazide (PRINZIDE,ZESTORETIC) 10-12.5 MG per tablet, TAKE ONE TABLET BY MOUTH EVERY DAY **THIS MEDICATION HAS BEEN SHORT FILLED TO LINE UP WITH YOUR OTHER MEDICATIONS**, Disp: 90 tablet, Rfl: 0  •  metFORMIN ER (GLUCOPHAGE-XR) 500 MG 24 hr tablet, Take 2 tablets by mouth 2 (Two) Times a Day., Disp: 360 tablet, Rfl: 1  •  Microlet Lancets misc, 1 each Daily. Dx code: E11.65, Disp: 100 each, Rfl: 2   Family History   Problem Relation Age of Onset   • Cancer Mother    • Cancer Father    • Cancer Maternal Uncle    • Cancer Other    • Colon cancer Neg Hx    • Malig Hyperthermia Neg Hx           Vital Signs:   Vitals:    08/09/22 0924   BP: 130/80   Pulse: 83   Temp: 97.7 °F (36.5 °C)   SpO2: 97%   Weight: 89.8 kg (198 lb)       Review of Systems   Physical Exam  Vitals reviewed.   Constitutional:       Appearance: Normal appearance. He is well-developed.   HENT:      Head: Normocephalic and atraumatic.      Right Ear: External ear normal.      Left Ear: External ear normal.      Mouth/Throat:      Pharynx: No oropharyngeal exudate.   Eyes:      Conjunctiva/sclera: Conjunctivae normal.      Pupils: Pupils are equal, round, and reactive to light.   Cardiovascular:      Rate and Rhythm: Normal rate and regular rhythm.      Pulses: Normal pulses.      Heart sounds: Normal heart sounds. No murmur heard.    No friction rub. No gallop.   Pulmonary:      Effort: Pulmonary effort is normal.      Breath sounds: Normal breath sounds. No wheezing or rhonchi.   Abdominal:      General: Bowel sounds are normal. There is no distension.      Palpations: Abdomen is soft.       Tenderness: There is no abdominal tenderness.   Musculoskeletal:         General: Normal range of motion.   Skin:     General: Skin is warm and dry.      Capillary Refill: Capillary refill takes less than 2 seconds.   Neurological:      Mental Status: He is alert and oriented to person, place, and time.      Cranial Nerves: No cranial nerve deficit.      Sensory: No sensory deficit.      Motor: No weakness.      Coordination: Coordination normal.      Gait: Gait normal.      Deep Tendon Reflexes: Reflexes normal.      Comments: Pt has tingling and slight numbness on right side of body, strength 5/5 in all extremities.   Psychiatric:         Mood and Affect: Mood and affect normal.         Behavior: Behavior normal.         Thought Content: Thought content normal.         Judgment: Judgment normal.        Result Review :   The following data was reviewed by: Cesar Pruitt MD on 08/09/2022:  CMP    CMP 6/9/22 6/10/22 6/13/22   Glucose 230 (A) 221 (A) 197 (A)   BUN 15 15 20   Creatinine 1.05 0.89 1.12   Sodium 138 136 137   Potassium 3.9 4.1 4.4   Chloride 100 102 100   Calcium 9.9 8.5 (A) 10.2   Albumin 4.50 3.60 4.80   Total Bilirubin 0.4 0.5 0.4   Alkaline Phosphatase 57 47 52   AST (SGOT) 57 (A) 48 (A) 66 (A)   ALT (SGPT) 73 (A) 55 (A) 80 (A)   (A) Abnormal value       Comments are available for some flowsheets but are not being displayed.           CBC    CBC 6/9/22 6/10/22 6/13/22   WBC 6.10 5.50 5.39   RBC 4.97 4.54 5.01   Hemoglobin 15.5 14.4 15.7   Hematocrit 44.5 40.4 44.4   MCV 89.4 89.0 88.6   MCH 31.2 31.7 31.3   MCHC 34.9 35.6 35.4   RDW 12.7 13.0 12.3   Platelets 177 142 199           Lipid Panel    Lipid Panel 1/22/22 6/10/22   Total Cholesterol 194 182   Triglycerides 151 (A) 206 (A)   HDL Cholesterol 42 43   VLDL Cholesterol 27 36   LDL Cholesterol  125 (A) 103 (A)   LDL/HDL Ratio 2.90 2.27   (A) Abnormal value            TSH    TSH 6/9/22   TSH 2.030           Most Recent A1C    HGBA1C Most Recent  7/30/22   Hemoglobin A1C 7.40 (A)   (A) Abnormal value                      Assessment and Plan    Diagnoses and all orders for this visit:    1. Type 2 diabetes mellitus with hyperglycemia, without long-term current use of insulin (HCC) (Primary)  -     CBC Auto Differential; Future  -     Comprehensive Metabolic Panel; Future  -     Hemoglobin A1c; Future  -     Lipid Panel; Future  -     MicroAlbumin, Urine, Random - Urine, Clean Catch; Future    2. CVA, old, alterations of sensations    3. Acute ischemic thalamic stroke, left (HCC)  -     aspirin (Aspirin Low Dose) 81 MG EC tablet; Take 1 tablet by mouth Daily.  Dispense: 90 tablet; Refill: 1    4. Screening PSA (prostate specific antigen)  -     PSA Screen; Future    5. Essential hypertension  -     TSH+Free T4; Future            Follow Up   Return in about 4 months (around 12/12/2022) for Recheck.  Patient was given instructions and counseling regarding his condition or for health maintenance advice. Please see specific information pulled into the AVS if appropriate.

## 2022-09-14 ENCOUNTER — APPOINTMENT (OUTPATIENT)
Dept: DIABETES SERVICES | Facility: HOSPITAL | Age: 57
End: 2022-09-14

## 2022-09-17 ENCOUNTER — CLINICAL SUPPORT (OUTPATIENT)
Dept: FAMILY MEDICINE CLINIC | Facility: CLINIC | Age: 57
End: 2022-09-17

## 2022-09-17 DIAGNOSIS — E78.5 HYPERLIPIDEMIA, UNSPECIFIED HYPERLIPIDEMIA TYPE: ICD-10-CM

## 2022-09-17 DIAGNOSIS — E11.65 TYPE 2 DIABETES MELLITUS WITH HYPERGLYCEMIA, WITHOUT LONG-TERM CURRENT USE OF INSULIN: ICD-10-CM

## 2022-09-17 DIAGNOSIS — I10 ESSENTIAL HYPERTENSION: ICD-10-CM

## 2022-09-17 DIAGNOSIS — Z12.5 SCREENING PSA (PROSTATE SPECIFIC ANTIGEN): ICD-10-CM

## 2022-09-17 LAB
ALBUMIN SERPL-MCNC: 4.3 G/DL (ref 3.5–5.2)
ALBUMIN UR-MCNC: 1.8 MG/DL
ALBUMIN/GLOB SERPL: 2 G/DL
ALP SERPL-CCNC: 41 U/L (ref 39–117)
ALT SERPL W P-5'-P-CCNC: 28 U/L (ref 1–41)
ANION GAP SERPL CALCULATED.3IONS-SCNC: 13.1 MMOL/L (ref 5–15)
AST SERPL-CCNC: 24 U/L (ref 1–40)
BASOPHILS # BLD AUTO: 0.03 10*3/MM3 (ref 0–0.2)
BASOPHILS NFR BLD AUTO: 0.6 % (ref 0–1.5)
BILIRUB SERPL-MCNC: 0.4 MG/DL (ref 0–1.2)
BUN SERPL-MCNC: 24 MG/DL (ref 6–20)
BUN/CREAT SERPL: 24.7 (ref 7–25)
CALCIUM SPEC-SCNC: 9.4 MG/DL (ref 8.6–10.5)
CHLORIDE SERPL-SCNC: 102 MMOL/L (ref 98–107)
CHOLEST SERPL-MCNC: 151 MG/DL (ref 0–200)
CO2 SERPL-SCNC: 24.9 MMOL/L (ref 22–29)
CREAT SERPL-MCNC: 0.97 MG/DL (ref 0.76–1.27)
DEPRECATED RDW RBC AUTO: 42.4 FL (ref 37–54)
EGFRCR SERPLBLD CKD-EPI 2021: 90.5 ML/MIN/1.73
EOSINOPHIL # BLD AUTO: 0.19 10*3/MM3 (ref 0–0.4)
EOSINOPHIL NFR BLD AUTO: 3.5 % (ref 0.3–6.2)
ERYTHROCYTE [DISTWIDTH] IN BLOOD BY AUTOMATED COUNT: 12.3 % (ref 12.3–15.4)
GLOBULIN UR ELPH-MCNC: 2.1 GM/DL
GLUCOSE SERPL-MCNC: 151 MG/DL (ref 65–99)
HBA1C MFR BLD: 7.3 % (ref 4.8–5.6)
HCT VFR BLD AUTO: 42.3 % (ref 37.5–51)
HDLC SERPL-MCNC: 51 MG/DL (ref 40–60)
HGB BLD-MCNC: 14.4 G/DL (ref 13–17.7)
IMM GRANULOCYTES # BLD AUTO: 0.01 10*3/MM3 (ref 0–0.05)
IMM GRANULOCYTES NFR BLD AUTO: 0.2 % (ref 0–0.5)
LDLC SERPL CALC-MCNC: 74 MG/DL (ref 0–100)
LDLC/HDLC SERPL: 1.36 {RATIO}
LYMPHOCYTES # BLD AUTO: 1.96 10*3/MM3 (ref 0.7–3.1)
LYMPHOCYTES NFR BLD AUTO: 36.6 % (ref 19.6–45.3)
MCH RBC QN AUTO: 32.1 PG (ref 26.6–33)
MCHC RBC AUTO-ENTMCNC: 34 G/DL (ref 31.5–35.7)
MCV RBC AUTO: 94.4 FL (ref 79–97)
MONOCYTES # BLD AUTO: 0.51 10*3/MM3 (ref 0.1–0.9)
MONOCYTES NFR BLD AUTO: 9.5 % (ref 5–12)
NEUTROPHILS NFR BLD AUTO: 2.66 10*3/MM3 (ref 1.7–7)
NEUTROPHILS NFR BLD AUTO: 49.6 % (ref 42.7–76)
NRBC BLD AUTO-RTO: 0 /100 WBC (ref 0–0.2)
PLATELET # BLD AUTO: 152 10*3/MM3 (ref 140–450)
PMV BLD AUTO: 10.3 FL (ref 6–12)
POTASSIUM SERPL-SCNC: 4.1 MMOL/L (ref 3.5–5.2)
PROT SERPL-MCNC: 6.4 G/DL (ref 6–8.5)
PSA SERPL-MCNC: 0.49 NG/ML (ref 0–4)
RBC # BLD AUTO: 4.48 10*6/MM3 (ref 4.14–5.8)
SODIUM SERPL-SCNC: 140 MMOL/L (ref 136–145)
T4 FREE SERPL-MCNC: 0.92 NG/DL (ref 0.93–1.7)
TRIGL SERPL-MCNC: 154 MG/DL (ref 0–150)
TSH SERPL DL<=0.05 MIU/L-ACNC: 1.45 UIU/ML (ref 0.27–4.2)
VLDLC SERPL-MCNC: 26 MG/DL (ref 5–40)
WBC NRBC COR # BLD: 5.36 10*3/MM3 (ref 3.4–10.8)

## 2022-09-17 PROCEDURE — 36415 COLL VENOUS BLD VENIPUNCTURE: CPT | Performed by: FAMILY MEDICINE

## 2022-09-17 PROCEDURE — 80061 LIPID PANEL: CPT | Performed by: FAMILY MEDICINE

## 2022-09-17 PROCEDURE — 80050 GENERAL HEALTH PANEL: CPT | Performed by: FAMILY MEDICINE

## 2022-09-17 PROCEDURE — 84439 ASSAY OF FREE THYROXINE: CPT | Performed by: FAMILY MEDICINE

## 2022-09-17 PROCEDURE — 82043 UR ALBUMIN QUANTITATIVE: CPT | Performed by: FAMILY MEDICINE

## 2022-09-17 PROCEDURE — 83036 HEMOGLOBIN GLYCOSYLATED A1C: CPT | Performed by: FAMILY MEDICINE

## 2022-09-17 PROCEDURE — G0103 PSA SCREENING: HCPCS | Performed by: FAMILY MEDICINE

## 2022-09-17 NOTE — PROGRESS NOTES
..  Venipuncture Blood Specimen Collection  Venipuncture performed in LT arm by Dena Judge MA with good hemostasis. Patient tolerated the procedure well without complications.   09/17/22   Dena Judge MA

## 2022-09-27 DIAGNOSIS — E11.65 TYPE 2 DIABETES MELLITUS WITH HYPERGLYCEMIA, WITHOUT LONG-TERM CURRENT USE OF INSULIN: ICD-10-CM

## 2022-09-27 RX ORDER — EMPAGLIFLOZIN 25 MG/1
TABLET, FILM COATED ORAL
Qty: 90 TABLET | Refills: 1 | Status: SHIPPED | OUTPATIENT
Start: 2022-09-27

## 2022-10-04 DIAGNOSIS — I10 ESSENTIAL HYPERTENSION: ICD-10-CM

## 2022-10-05 RX ORDER — LISINOPRIL AND HYDROCHLOROTHIAZIDE 12.5; 1 MG/1; MG/1
TABLET ORAL
Qty: 90 TABLET | Refills: 0 | Status: SHIPPED | OUTPATIENT
Start: 2022-10-05 | End: 2023-01-15 | Stop reason: HOSPADM

## 2022-11-14 ENCOUNTER — OFFICE VISIT (OUTPATIENT)
Dept: SLEEP MEDICINE | Facility: HOSPITAL | Age: 57
End: 2022-11-14

## 2022-11-14 VITALS
DIASTOLIC BLOOD PRESSURE: 74 MMHG | SYSTOLIC BLOOD PRESSURE: 113 MMHG | WEIGHT: 198.8 LBS | BODY MASS INDEX: 30.13 KG/M2 | OXYGEN SATURATION: 96 % | HEART RATE: 66 BPM | HEIGHT: 68 IN

## 2022-11-14 DIAGNOSIS — E66.9 CLASS 1 OBESITY: ICD-10-CM

## 2022-11-14 DIAGNOSIS — Z99.89 OSA ON CPAP: Primary | ICD-10-CM

## 2022-11-14 DIAGNOSIS — G47.33 OSA ON CPAP: Primary | ICD-10-CM

## 2022-11-14 PROBLEM — R06.83 SNORING: Status: RESOLVED | Noted: 2022-06-15 | Resolved: 2022-11-14

## 2022-11-14 PROBLEM — G47.8 NON-RESTORATIVE SLEEP: Status: RESOLVED | Noted: 2022-06-15 | Resolved: 2022-11-14

## 2022-11-14 PROCEDURE — G0463 HOSPITAL OUTPT CLINIC VISIT: HCPCS

## 2022-11-14 PROCEDURE — 99213 OFFICE O/P EST LOW 20 MIN: CPT | Performed by: INTERNAL MEDICINE

## 2022-11-14 NOTE — PROGRESS NOTES
"  [address] [address]  [place]   [place] [address]  Phone: [phone]  Fax: [phone]      SLEEP CLINIC FOLLOW UP PROGRESS NOTE.    Glenroy Penaloza  [id number]   1965  57 y.o.  male      PCP: Cesar Pruitt MD      Date of visit: 11/14/2022    Chief Complaint   Patient presents with   • Sleep Apnea   • Obesity       HPI:  This is a 57 y.o. years old patient is here for the management of obstructive sleep apnea.  Sleep apnea is moderate in severity with a AHI of 18/hr. Patient is using positive airway pressure therapy with auto CPAP and the symptoms of snoring, non-restorative sleep and daytime excessive sleepiness have improved significantly on the therapy. Normally goes to bed at 9 PM and wakes up at 5 AM.  The patient wakes up 1 time(s) during the night and has no problem going back to sleep.  Feels refreshed after waking up.  Patient also denies headaches and nasal congestion.  He is a  works on heavy equipment    Medications and allergies are reviewed by me and documented in the encounter.     SOCIAL (habits pertaining to sleep medicine)  History tobacco use:No   History of alcohol use: 6 per week  Caffeine use: 0     REVIEW OF SYSTEMS:   [place] Sleepiness Scale :Total score: 13   Nasal congestion:No   Dry mouth/nose:No   Post nasal drip; No   Acid reflux/Heartburn:No   Abd bloating:No   Morning headache:No   Anxiety:No   Depression:No    PHYSICAL EXAMINATION:  CONSTITUTIONAL:  Vitals:    11/14/22 0700   BP: 113/74   Pulse: 66   SpO2: 96%   Weight: 90.2 kg (198 lb 12.8 oz)   Height: 172.7 cm (67.99\")    Body mass index is 30.23 kg/m².   NOSE: nasal passages are clear, No deformities noted   RESP SYSTEM: Not in any respiratory distress, no chest deformities noted,   CARDIOVASULAR: No edema noted  NEURO: Oriented x 3, gait normal,  Mood and affect appeared appropriate      Data reviewed:  The Smart card downloaded on 11/14/2022 has been reviewed independently by " me for compliance and discussed the data with the patient.   Compliance; 90%  More than 4 hr use, 86%  Average use of the device 5 hours and 50 minutes per night  Residual AHI: 1.1 /hr (goal < 5.0 /hr)  Mask type: Fullface mask  Device: Padmini  DME: [facility]      ASSESSMENT AND PLAN:  · Obstructive sleep apnea ( G 47.33).  The symptoms of sleep apnea have improved with the device and the treatment.  Patient's compliance with the device is excellent for treatment of sleep apnea.  I have independently reviewed the smart card down load and discussed with the patient the download data and encouarged the patient to continue to use the device.The residual AHI is acceptable. The device is benefiting the patient and the device is medically necessary.  Without proper control of sleep apnea and good compliance there is a increased risk for hypertension, diabetes mellitus and nonrestorative sleep with hypersomnia which can increase risk for motor vehicle accidents.  Untreated sleep apnea is also a risk factor for development of atrial fibrillation, pulmonary hypertension and stroke. The patient is also instructed to get the supplies from the [facility] and and change them on a regular basis.  A prescription for supplies has been sent to the [facility].  I have also discussed the good sleep hygiene habits and adequate amount of sleep needed for good health.  · Obesity  1 with BMI is Body mass index is 30.23 kg/m².. I have discuss the relationship between the weight and sleep apnea. The benefit of weight loss in reducing severity of sleep apnea was discussed. Discussed diet and exercise with the patient to achieve ideal BMI.   · Return in about 1 year (around 11/14/2023) for with smart card down load. . Patient's questions were answered.      Yumiko Hitchcock MD  Sleep Medicine.  Medical Director, [facility] sleep [facility]  11/14/2022 ,

## 2022-12-15 ENCOUNTER — OFFICE VISIT (OUTPATIENT)
Dept: FAMILY MEDICINE CLINIC | Facility: CLINIC | Age: 57
End: 2022-12-15

## 2022-12-15 VITALS
DIASTOLIC BLOOD PRESSURE: 70 MMHG | SYSTOLIC BLOOD PRESSURE: 102 MMHG | BODY MASS INDEX: 30.11 KG/M2 | TEMPERATURE: 97.3 F | WEIGHT: 198 LBS | HEART RATE: 82 BPM | OXYGEN SATURATION: 98 %

## 2022-12-15 DIAGNOSIS — M25.532 LEFT WRIST PAIN: Primary | ICD-10-CM

## 2022-12-15 DIAGNOSIS — I42.9 CARDIOMYOPATHY, UNSPECIFIED TYPE: ICD-10-CM

## 2022-12-15 DIAGNOSIS — E11.65 TYPE 2 DIABETES MELLITUS WITH HYPERGLYCEMIA, WITHOUT LONG-TERM CURRENT USE OF INSULIN: ICD-10-CM

## 2022-12-15 DIAGNOSIS — I63.9 CEREBROVASCULAR ACCIDENT (CVA), UNSPECIFIED MECHANISM: ICD-10-CM

## 2022-12-15 LAB
ALBUMIN SERPL-MCNC: 4.3 G/DL (ref 3.5–5.2)
ALBUMIN/GLOB SERPL: 1.8 G/DL
ALP SERPL-CCNC: 46 U/L (ref 39–117)
ALT SERPL W P-5'-P-CCNC: 26 U/L (ref 1–41)
ANION GAP SERPL CALCULATED.3IONS-SCNC: 7.6 MMOL/L (ref 5–15)
AST SERPL-CCNC: 24 U/L (ref 1–40)
BILIRUB SERPL-MCNC: 0.3 MG/DL (ref 0–1.2)
BUN SERPL-MCNC: 18 MG/DL (ref 6–20)
BUN/CREAT SERPL: 18.4 (ref 7–25)
CALCIUM SPEC-SCNC: 9.5 MG/DL (ref 8.6–10.5)
CHLORIDE SERPL-SCNC: 105 MMOL/L (ref 98–107)
CO2 SERPL-SCNC: 27.4 MMOL/L (ref 22–29)
CREAT SERPL-MCNC: 0.98 MG/DL (ref 0.76–1.27)
EGFRCR SERPLBLD CKD-EPI 2021: 89.4 ML/MIN/1.73
GLOBULIN UR ELPH-MCNC: 2.4 GM/DL
GLUCOSE SERPL-MCNC: 132 MG/DL (ref 65–99)
POTASSIUM SERPL-SCNC: 4 MMOL/L (ref 3.5–5.2)
PROT SERPL-MCNC: 6.7 G/DL (ref 6–8.5)
SODIUM SERPL-SCNC: 140 MMOL/L (ref 136–145)

## 2022-12-15 PROCEDURE — 83036 HEMOGLOBIN GLYCOSYLATED A1C: CPT | Performed by: FAMILY MEDICINE

## 2022-12-15 PROCEDURE — 80053 COMPREHEN METABOLIC PANEL: CPT | Performed by: FAMILY MEDICINE

## 2022-12-15 PROCEDURE — 99214 OFFICE O/P EST MOD 30 MIN: CPT | Performed by: FAMILY MEDICINE

## 2022-12-15 RX ORDER — MELOXICAM 15 MG/1
15 TABLET ORAL DAILY
Qty: 7 TABLET | Refills: 0 | Status: SHIPPED | OUTPATIENT
Start: 2022-12-15 | End: 2023-01-04

## 2022-12-15 NOTE — PROGRESS NOTES
Chief Complaint  Cerebrovascular Accident (Follow up on stroke )    Subjective          Glenroy Penaloza presents to [facility]  History of Present Illness  Left wrist pain x 2 months- no known injury- pain at base of thumb and radiates back into wrist  Diabetes  He presents for his follow-up diabetic visit. He has type 2 diabetes mellitus. His disease course has been stable. There are no hypoglycemic associated symptoms. Pertinent negatives for diabetes include no blurred vision, no chest pain, no fatigue, no foot paresthesias, no foot ulcerations, no polydipsia, no polyphagia, no polyuria, no visual change, no weakness and no weight loss. There are no hypoglycemic complications. Symptoms are stable. Risk factors for coronary artery disease include diabetes mellitus, dyslipidemia, male sex and hypertension. Current diabetic treatment includes oral agent (dual therapy). He is compliant with treatment all of the time. His weight is stable. He is following a generally healthy diet. He participates in exercise intermittently. An ACE inhibitor/angiotensin II receptor blocker is being taken.       Objective   No Known Allergies    There is no immunization history on file for this patient.  Past Medical History:   Diagnosis Date   • Atypical rash    • Diabetes mellitus, type 2 (HCC)    • Disorder of male genital organs    • Elevated glucose    • Essential hypertension 07/30/2020   • Glucosuria    • Hyperlipidemia    • Stroke (HCC) 6/9/22   • TIA (transient ischemic attack)       Past Surgical History:   Procedure Laterality Date   • COLONOSCOPY N/A 04/04/2022    Procedure: COLONOSCOPY WITH POLYPECTOMY;  Surgeon: Arsen Payan MD;  Location: [facility] ENDOSCOPY;  Service: Gastroenterology;  Laterality: N/A;  COLON POLYP, HEMORRHOIDS      Social History     Socioeconomic History   • Marital status:    Tobacco Use   • Smoking status: Never     Passive exposure: Never   • Smokeless  tobacco: Former     Quit date: 8/1/2021   • Tobacco comments:     Smokeless tobacco 40 years until 8/1/21   Vaping Use   • Vaping Use: Never used   Substance and Sexual Activity   • Alcohol use: Yes     Alcohol/week: 10.0 standard drinks     Types: 10 Cans of beer per week     Comment: current some day    • Drug use: Never   • Sexual activity: Yes     Partners: Female     Birth control/protection: None        Current Outpatient Medications:   •  aspirin (Aspirin Low Dose) 81 MG EC tablet, Take 1 tablet by mouth Daily., Disp: 90 tablet, Rfl: 1  •  atorvastatin (LIPITOR) 40 MG tablet, Take 40 mg by mouth Daily., Disp: , Rfl:   •  glucose blood test strip, 100 each by Other route Daily. Use as instructed Dx code: E11.65, Disp: 100 each, Rfl: 2  •  Jardiance 25 MG tablet tablet, TAKE 1 TABLET BY MOUTH EVERY DAY, Disp: 90 tablet, Rfl: 1  •  lisinopril-hydrochlorothiazide (PRINZIDE,ZESTORETIC) 10-12.5 MG per tablet, TAKE ONE TABLET BY MOUTH EVERY DAY, Disp: 90 tablet, Rfl: 0  •  metFORMIN ER (GLUCOPHAGE-XR) 500 MG 24 hr tablet, Take 2 tablets by mouth 2 (Two) Times a Day., Disp: 360 tablet, Rfl: 1  •  Microlet Lancets misc, 1 each Daily. Dx code: E11.65, Disp: 100 each, Rfl: 2  •  meloxicam (Mobic) 15 MG tablet, Take 1 tablet by mouth Daily., Disp: 7 tablet, Rfl: 0   Family History   Problem Relation Age of Onset   • Cancer Mother    • Cancer Father    • Cancer Maternal Uncle    • Cancer Other    • Colon cancer Neg Hx    • Malig Hyperthermia Neg Hx           Vital Signs:   Vitals:    12/15/22 1537   BP: 102/70   Pulse: 82   Temp: 97.3 °F (36.3 °C)   SpO2: 98%   Weight: 89.8 kg (198 lb)       Review of Systems   Constitutional: Negative for fatigue and weight loss.   Eyes: Negative for blurred vision.   Cardiovascular: Negative for chest pain.   Endocrine: Negative for polydipsia, polyphagia and polyuria.   Neurological: Negative for weakness.      Physical Exam  Vitals reviewed.   Constitutional:       Appearance: Normal  appearance. He is well-developed.   HENT:      Head: Normocephalic and atraumatic.      Right Ear: External ear normal.      Left Ear: External ear normal.      Mouth/Throat:      Pharynx: No oropharyngeal exudate.   Eyes:      Conjunctiva/sclera: Conjunctivae normal.      Pupils: Pupils are equal, round, and reactive to light.   Cardiovascular:      Rate and Rhythm: Normal rate and regular rhythm.      Pulses: Normal pulses.           Dorsalis pedis pulses are 2+ on the right side and 2+ on the left side.      Heart sounds: Normal heart sounds. No murmur heard.    No friction rub. No gallop.   Pulmonary:      Effort: Pulmonary effort is normal.      Breath sounds: Normal breath sounds. No wheezing or rhonchi.   Abdominal:      General: Abdomen is flat. Bowel sounds are normal. There is no distension.      Palpations: Abdomen is soft. There is no mass.      Tenderness: There is no abdominal tenderness. There is no guarding or rebound.      Hernia: No hernia is present.   Musculoskeletal:         General: Normal range of motion.      Right foot: Normal range of motion. Deformity present. No bunion, Charcot foot, foot drop or prominent metatarsal heads.      Left foot: Normal range of motion. Deformity present. No bunion, Charcot foot, foot drop or prominent metatarsal heads.      Comments: Left wrist tenderness at base on thumb, no redness, warmth, swelling, or bruising.   Feet:      Right foot:      Protective Sensation: 3 sites tested. 3 sites sensed.      Skin integrity: Skin integrity normal. No ulcer, blister or callus.      Toenail Condition: Right toenails are normal.      Left foot:      Protective Sensation: 3 sites tested. 3 sites sensed.      Skin integrity: Skin integrity normal. No ulcer, blister or callus.      Toenail Condition: Left toenails are normal.      Comments:    pt has hammer toes  Skin:     General: Skin is warm and dry.      Capillary Refill: Capillary refill takes less than 2 seconds.    Neurological:      General: No focal deficit present.      Mental Status: He is alert and oriented to person, place, and time.      Cranial Nerves: No cranial nerve deficit.   Psychiatric:         Mood and Affect: Mood and affect normal.         Behavior: Behavior normal.         Thought Content: Thought content normal.         Judgment: Judgment normal.        Result Review :   The following data was reviewed by: Cesar Pruitt MD on 12/15/2022:  CMP    CMP 6/10/22 6/13/22 9/17/22   Glucose 221 (A) 197 (A) 151 (A)   BUN 15 20 24 (A)   Creatinine 0.89 1.12 0.97   Sodium 136 137 140   Potassium 4.1 4.4 4.1   Chloride 102 100 102   Calcium 8.5 (A) 10.2 9.4   Albumin 3.60 4.80 4.30   Total Bilirubin 0.5 0.4 0.4   Alkaline Phosphatase 47 52 41   AST (SGOT) 48 (A) 66 (A) 24   ALT (SGPT) 55 (A) 80 (A) 28   (A) Abnormal value            CBC    CBC 6/10/22 6/13/22 9/17/22   WBC 5.50 5.39 5.36   RBC 4.54 5.01 4.48   Hemoglobin 14.4 15.7 14.4   Hematocrit 40.4 44.4 42.3   MCV 89.0 88.6 94.4   MCH 31.7 31.3 32.1   MCHC 35.6 35.4 34.0   RDW 13.0 12.3 12.3   Platelets 142 199 152           Lipid Panel    Lipid Panel 1/22/22 6/10/22 9/17/22   Total Cholesterol 194 182 151   Triglycerides 151 (A) 206 (A) 154 (A)   HDL Cholesterol 42 43 51   VLDL Cholesterol 27 36 26   LDL Cholesterol  125 (A) 103 (A) 74   LDL/HDL Ratio 2.90 2.27 1.36   (A) Abnormal value            TSH    TSH 6/9/22 9/17/22   TSH 2.030 1.450           Most Recent A1C    HGBA1C Most Recent 9/17/22   Hemoglobin A1C 7.30 (A)   (A) Abnormal value            Microalbumin    Microalbumin 9/17/22   Microalbumin, Urine 1.8           PSA    PSA 9/17/22   PSA 0.490           Data reviewed: Radiologic studies I viewed and interpreted 3 views left wrist x-rays:no fxs.          Assessment and Plan    Diagnoses and all orders for this visit:    1. Left wrist pain (Primary)  -     XR Wrist 3+ View Left (In Office)  -     meloxicam (Mobic) 15 MG tablet; Take 1 tablet by mouth  Daily.  Dispense: 7 tablet; Refill: 0    2. Type 2 diabetes mellitus with hyperglycemia, without long-term current use of insulin (HCC)  -     Comprehensive Metabolic Panel  -     Hemoglobin A1c  -     Ortho Footwear Men's Shoes    3. Cardiomyopathy, unspecified type (HCC)  -     Cancel: Ambulatory Referral to Cardiology  -     Ambulatory Referral to Cardiology    4. Cerebrovascular accident (CVA), unspecified mechanism (HCC)  -     Cancel: Ambulatory Referral to Cardiology  -     Ambulatory Referral to Cardiology            Follow Up {Instructions Charge Capture  Follow-up Communications :23}  Return in about 1 week (around 12/22/2022) for Recheck.  Patient was given instructions and counseling regarding his condition or for health maintenance advice. Please see specific information pulled into the AVS if appropriate.     Placed left hand in thumb spica splint.  Pt will call if not better- then will refer to hand surgery.

## 2022-12-15 NOTE — PROGRESS NOTES
Venipuncture Blood Specimen Collection  Venipuncture performed in left arm by Rosalva Askew with good hemostasis. Patient tolerated the procedure well without complications.   12/15/22   Rosalva Askew

## 2022-12-16 LAB — HBA1C MFR BLD: 7.4 % (ref 4.8–5.6)

## 2022-12-28 RX ORDER — ATORVASTATIN CALCIUM 40 MG/1
TABLET, FILM COATED ORAL
Qty: 90 TABLET | Refills: 1 | OUTPATIENT
Start: 2022-12-28

## 2022-12-29 ENCOUNTER — TELEPHONE (OUTPATIENT)
Dept: FAMILY MEDICINE CLINIC | Facility: CLINIC | Age: 57
End: 2022-12-29

## 2022-12-29 DIAGNOSIS — M25.532 LEFT WRIST PAIN: Primary | ICD-10-CM

## 2022-12-29 NOTE — TELEPHONE ENCOUNTER
Patient requesting referral to hand specialist for left wrist--Dr. Pruitt had told patient if no improvement in a few weeks he was going to refer him.  Can you please place order for patient?

## 2023-01-03 ENCOUNTER — TELEPHONE (OUTPATIENT)
Dept: FAMILY MEDICINE CLINIC | Facility: CLINIC | Age: 58
End: 2023-01-03
Payer: COMMERCIAL

## 2023-01-03 RX ORDER — ATORVASTATIN CALCIUM 40 MG/1
40 TABLET, FILM COATED ORAL DAILY
Qty: 90 TABLET | Refills: 1 | Status: SHIPPED | OUTPATIENT
Start: 2023-01-03

## 2023-01-03 NOTE — TELEPHONE ENCOUNTER
Patient requesting refill on Lipitor 40mg--Just had lipid panel 09/2022 and saw Dr. Pruitt 12/15/2022

## 2023-01-04 ENCOUNTER — OFFICE VISIT (OUTPATIENT)
Dept: CARDIOLOGY | Facility: CLINIC | Age: 58
End: 2023-01-04
Payer: COMMERCIAL

## 2023-01-04 VITALS
DIASTOLIC BLOOD PRESSURE: 73 MMHG | RESPIRATION RATE: 18 BRPM | HEIGHT: 68 IN | BODY MASS INDEX: 30.01 KG/M2 | SYSTOLIC BLOOD PRESSURE: 116 MMHG | HEART RATE: 70 BPM | WEIGHT: 198 LBS

## 2023-01-04 DIAGNOSIS — R07.89 CHEST PAIN, ATYPICAL: Primary | ICD-10-CM

## 2023-01-04 PROCEDURE — 99214 OFFICE O/P EST MOD 30 MIN: CPT | Performed by: INTERNAL MEDICINE

## 2023-01-05 ENCOUNTER — TELEPHONE (OUTPATIENT)
Dept: FAMILY MEDICINE CLINIC | Facility: CLINIC | Age: 58
End: 2023-01-05
Payer: COMMERCIAL

## 2023-01-05 NOTE — TELEPHONE ENCOUNTER
Patient called about his metformin, he said that they advised him not to take his medication for 2 days prior to his stress test UNLESS directed by his primary care provider to continue that medication. I told him that if it was only his metformin and they needed him to hold it for 2 days to do his stress test that it was okay to do so. He said that he was not asking me he wants a doctor's answer. He is also aware that his primary care provider is out of the office at this time, and I explained to him numerous times that normally for procedure purposes it is okay to hold a routine medication that is not necessarily a life sustaining medication in order to have the test done. He said to ask someone and let him know. Please advise what to tell the patient in regards to stopping his metformin for 2 days prior to his stress test.

## 2023-01-06 NOTE — TELEPHONE ENCOUNTER
I am not aware of any recommendation to hold metformin prior to a stress test.  My understanding and previous experience is that metformin is to be held after a procedure that the patient has had some type of IV/contrast dye.  This is for protection of the kidneys.  At this time I am unsure as to why the cardiology department would require or request him to discontinue his metformin for 2 days prior however if that was their suggestion it will be safe and is perfectly okay for him to stop the metformin for the 2 days.  Stopping that medication for 2 days is not going to greatly adversely affect him and is not going to worsen his diabetes numbers.  

## 2023-01-11 ENCOUNTER — HOSPITAL ENCOUNTER (OUTPATIENT)
Dept: NUCLEAR MEDICINE | Facility: HOSPITAL | Age: 58
Discharge: HOME | End: 2023-01-11
Payer: COMMERCIAL

## 2023-01-11 DIAGNOSIS — R07.89 CHEST PAIN, ATYPICAL: ICD-10-CM

## 2023-01-11 PROCEDURE — 93016 CV STRESS TEST SUPVJ ONLY: CPT | Performed by: INTERNAL MEDICINE

## 2023-01-11 PROCEDURE — 0 TECHNETIUM TETROFOSMIN KIT: Performed by: INTERNAL MEDICINE

## 2023-01-11 PROCEDURE — A9502 TC99M TETROFOSMIN: HCPCS | Performed by: INTERNAL MEDICINE

## 2023-01-11 PROCEDURE — 78452 HT MUSCLE IMAGE SPECT MULT: CPT

## 2023-01-11 PROCEDURE — 93017 CV STRESS TEST TRACING ONLY: CPT

## 2023-01-11 PROCEDURE — 78452 HT MUSCLE IMAGE SPECT MULT: CPT | Performed by: INTERNAL MEDICINE

## 2023-01-11 PROCEDURE — 93018 CV STRESS TEST I&R ONLY: CPT | Performed by: INTERNAL MEDICINE

## 2023-01-11 RX ADMIN — TETROFOSMIN 1 DOSE: 1.38 INJECTION, POWDER, LYOPHILIZED, FOR SOLUTION INTRAVENOUS at 14:38

## 2023-01-11 RX ADMIN — TETROFOSMIN 1 DOSE: 1.38 INJECTION, POWDER, LYOPHILIZED, FOR SOLUTION INTRAVENOUS at 13:40

## 2023-01-13 ENCOUNTER — APPOINTMENT (OUTPATIENT)
Dept: GENERAL RADIOLOGY | Facility: HOSPITAL | Age: 58
End: 2023-01-13
Payer: COMMERCIAL

## 2023-01-13 LAB
ALBUMIN SERPL-MCNC: 4.5 G/DL (ref 3.5–5.2)
ALBUMIN/GLOB SERPL: 1.6 G/DL
ALP SERPL-CCNC: 55 U/L (ref 39–117)
ALT SERPL W P-5'-P-CCNC: 33 U/L (ref 1–41)
ANION GAP SERPL CALCULATED.3IONS-SCNC: 12 MMOL/L (ref 5–15)
AST SERPL-CCNC: 29 U/L (ref 1–40)
BASOPHILS # BLD AUTO: 0 10*3/MM3 (ref 0–0.2)
BASOPHILS NFR BLD AUTO: 0.7 % (ref 0–1.5)
BH CV REST NUCLEAR ISOTOPE DOSE: 9.8 MCI
BH CV STRESS BP STAGE 1: NORMAL
BH CV STRESS BP STAGE 2: NORMAL
BH CV STRESS BP STAGE 3: NORMAL
BH CV STRESS DURATION MIN STAGE 1: 3
BH CV STRESS DURATION MIN STAGE 2: 3
BH CV STRESS DURATION MIN STAGE 3: 3
BH CV STRESS DURATION SEC STAGE 1: 0
BH CV STRESS DURATION SEC STAGE 2: 0
BH CV STRESS DURATION SEC STAGE 3: 0
BH CV STRESS GRADE STAGE 1: 10
BH CV STRESS GRADE STAGE 2: 12
BH CV STRESS GRADE STAGE 3: 14
BH CV STRESS HR STAGE 1: 110
BH CV STRESS HR STAGE 2: 121
BH CV STRESS HR STAGE 3: 138
BH CV STRESS METS STAGE 1: 5
BH CV STRESS METS STAGE 2: 7.5
BH CV STRESS METS STAGE 3: 10
BH CV STRESS NUCLEAR ISOTOPE DOSE: 30.7 MCI
BH CV STRESS PROTOCOL 1: NORMAL
BH CV STRESS RECOVERY BP: NORMAL MMHG
BH CV STRESS RECOVERY HR: 93 BPM
BH CV STRESS SPEED STAGE 1: 1.7
BH CV STRESS SPEED STAGE 2: 2.5
BH CV STRESS SPEED STAGE 3: 3.4
BH CV STRESS STAGE 1: 1
BH CV STRESS STAGE 2: 2
BH CV STRESS STAGE 3: 3
BILIRUB SERPL-MCNC: 0.3 MG/DL (ref 0–1.2)
BUN SERPL-MCNC: 17 MG/DL (ref 6–20)
BUN/CREAT SERPL: 18.7 (ref 7–25)
CALCIUM SPEC-SCNC: 9.2 MG/DL (ref 8.6–10.5)
CHLORIDE SERPL-SCNC: 100 MMOL/L (ref 98–107)
CO2 SERPL-SCNC: 24 MMOL/L (ref 22–29)
CREAT SERPL-MCNC: 0.91 MG/DL (ref 0.76–1.27)
DEPRECATED RDW RBC AUTO: 42.9 FL (ref 37–54)
EGFRCR SERPLBLD CKD-EPI 2021: 97.7 ML/MIN/1.73
EOSINOPHIL # BLD AUTO: 0.3 10*3/MM3 (ref 0–0.4)
EOSINOPHIL NFR BLD AUTO: 5.6 % (ref 0.3–6.2)
ERYTHROCYTE [DISTWIDTH] IN BLOOD BY AUTOMATED COUNT: 12.8 % (ref 12.3–15.4)
GLOBULIN UR ELPH-MCNC: 2.9 GM/DL
GLUCOSE SERPL-MCNC: 120 MG/DL (ref 65–99)
HCT VFR BLD AUTO: 40.9 % (ref 37.5–51)
HGB BLD-MCNC: 14.4 G/DL (ref 13–17.7)
LYMPHOCYTES # BLD AUTO: 2.6 10*3/MM3 (ref 0.7–3.1)
LYMPHOCYTES NFR BLD AUTO: 46 % (ref 19.6–45.3)
MAXIMAL PREDICTED HEART RATE: 162 BPM
MCH RBC QN AUTO: 32 PG (ref 26.6–33)
MCHC RBC AUTO-ENTMCNC: 35.2 G/DL (ref 31.5–35.7)
MCV RBC AUTO: 91 FL (ref 79–97)
MONOCYTES # BLD AUTO: 0.5 10*3/MM3 (ref 0.1–0.9)
MONOCYTES NFR BLD AUTO: 9.3 % (ref 5–12)
NEUTROPHILS NFR BLD AUTO: 2.2 10*3/MM3 (ref 1.7–7)
NEUTROPHILS NFR BLD AUTO: 38.4 % (ref 42.7–76)
NRBC BLD AUTO-RTO: 0 /100 WBC (ref 0–0.2)
PERCENT MAX PREDICTED HR: 85.19 %
PLATELET # BLD AUTO: 163 10*3/MM3 (ref 140–450)
PMV BLD AUTO: 8.3 FL (ref 6–12)
POTASSIUM SERPL-SCNC: 4 MMOL/L (ref 3.5–5.2)
PROT SERPL-MCNC: 7.4 G/DL (ref 6–8.5)
RBC # BLD AUTO: 4.5 10*6/MM3 (ref 4.14–5.8)
SODIUM SERPL-SCNC: 136 MMOL/L (ref 136–145)
STRESS BASELINE BP: NORMAL MMHG
STRESS BASELINE HR: 86 BPM
STRESS PERCENT HR: 100 %
STRESS POST ESTIMATED WORKLOAD: 10.1 METS
STRESS POST EXERCISE DUR MIN: 9 MIN
STRESS POST EXERCISE DUR SEC: 19 SEC
STRESS POST PEAK BP: NORMAL MMHG
STRESS POST PEAK HR: 138 BPM
STRESS TARGET HR: 138 BPM
TROPONIN T SERPL-MCNC: <0.01 NG/ML (ref 0–0.03)
WBC NRBC COR # BLD: 5.6 10*3/MM3 (ref 3.4–10.8)

## 2023-01-13 PROCEDURE — 84484 ASSAY OF TROPONIN QUANT: CPT | Performed by: PHYSICIAN ASSISTANT

## 2023-01-13 PROCEDURE — 99285 EMERGENCY DEPT VISIT HI MDM: CPT

## 2023-01-13 PROCEDURE — 85025 COMPLETE CBC W/AUTO DIFF WBC: CPT | Performed by: PHYSICIAN ASSISTANT

## 2023-01-13 PROCEDURE — 71045 X-RAY EXAM CHEST 1 VIEW: CPT

## 2023-01-13 PROCEDURE — 93005 ELECTROCARDIOGRAM TRACING: CPT | Performed by: EMERGENCY MEDICINE

## 2023-01-13 PROCEDURE — 93005 ELECTROCARDIOGRAM TRACING: CPT

## 2023-01-13 PROCEDURE — 80053 COMPREHEN METABOLIC PANEL: CPT | Performed by: PHYSICIAN ASSISTANT

## 2023-01-13 RX ORDER — SODIUM CHLORIDE 0.9 % (FLUSH) 0.9 %
10 SYRINGE (ML) INJECTION AS NEEDED
Status: DISCONTINUED | OUTPATIENT
Start: 2023-01-13 | End: 2023-01-15 | Stop reason: HOSPADM

## 2023-01-13 RX ORDER — ASPIRIN 81 MG/1
324 TABLET, CHEWABLE ORAL ONCE
Status: COMPLETED | OUTPATIENT
Start: 2023-01-13 | End: 2023-01-14

## 2023-01-14 ENCOUNTER — HOSPITAL ENCOUNTER (OUTPATIENT)
Facility: HOSPITAL | Age: 58
Discharge: HOME | End: 2023-01-15
Attending: EMERGENCY MEDICINE | Admitting: INTERNAL MEDICINE
Payer: COMMERCIAL

## 2023-01-14 DIAGNOSIS — E11.65 TYPE 2 DIABETES MELLITUS WITH HYPERGLYCEMIA, WITHOUT LONG-TERM CURRENT USE OF INSULIN: ICD-10-CM

## 2023-01-14 DIAGNOSIS — R07.9 CHEST PAIN, UNSPECIFIED TYPE: Primary | ICD-10-CM

## 2023-01-14 PROBLEM — Z99.89 OSA ON CPAP: Chronic | Status: ACTIVE | Noted: 2022-06-15

## 2023-01-14 PROBLEM — E66.811 CLASS 1 OBESITY: Chronic | Status: ACTIVE | Noted: 2022-06-15

## 2023-01-14 PROBLEM — I10 ESSENTIAL HYPERTENSION: Chronic | Status: ACTIVE | Noted: 2020-07-30

## 2023-01-14 PROBLEM — Z86.73 HISTORY OF STROKE: Status: ACTIVE | Noted: 2022-06-10

## 2023-01-14 PROBLEM — E66.9 CLASS 1 OBESITY: Chronic | Status: ACTIVE | Noted: 2022-06-15

## 2023-01-14 PROBLEM — E78.5 HYPERLIPIDEMIA: Chronic | Status: ACTIVE | Noted: 2021-07-15

## 2023-01-14 PROBLEM — E11.9 DIABETES MELLITUS, TYPE 2: Chronic | Status: ACTIVE | Noted: 2021-07-15

## 2023-01-14 PROBLEM — G47.33 OSA ON CPAP: Chronic | Status: ACTIVE | Noted: 2022-06-15

## 2023-01-14 LAB
APTT PPP: 26.7 SECONDS (ref 61–76.5)
GLUCOSE BLDC GLUCOMTR-MCNC: 105 MG/DL (ref 70–105)
GLUCOSE BLDC GLUCOMTR-MCNC: 135 MG/DL (ref 70–105)
INR PPP: 0.97 (ref 0.93–1.1)
PROTHROMBIN TIME: 10 SECONDS (ref 9.6–11.7)
TROPONIN T SERPL-MCNC: <0.01 NG/ML (ref 0–0.03)

## 2023-01-14 PROCEDURE — 96374 THER/PROPH/DIAG INJ IV PUSH: CPT

## 2023-01-14 PROCEDURE — G0378 HOSPITAL OBSERVATION PER HR: HCPCS

## 2023-01-14 PROCEDURE — 82962 GLUCOSE BLOOD TEST: CPT

## 2023-01-14 PROCEDURE — 96372 THER/PROPH/DIAG INJ SC/IM: CPT

## 2023-01-14 PROCEDURE — 85730 THROMBOPLASTIN TIME PARTIAL: CPT | Performed by: EMERGENCY MEDICINE

## 2023-01-14 PROCEDURE — 36415 COLL VENOUS BLD VENIPUNCTURE: CPT

## 2023-01-14 PROCEDURE — 85610 PROTHROMBIN TIME: CPT | Performed by: EMERGENCY MEDICINE

## 2023-01-14 PROCEDURE — 84484 ASSAY OF TROPONIN QUANT: CPT | Performed by: PHYSICIAN ASSISTANT

## 2023-01-14 PROCEDURE — 25010000002 ENOXAPARIN PER 10 MG: Performed by: PHYSICIAN ASSISTANT

## 2023-01-14 PROCEDURE — 99214 OFFICE O/P EST MOD 30 MIN: CPT | Performed by: INTERNAL MEDICINE

## 2023-01-14 RX ORDER — CHOLECALCIFEROL (VITAMIN D3) 125 MCG
5 CAPSULE ORAL NIGHTLY PRN
Status: DISCONTINUED | OUTPATIENT
Start: 2023-01-14 | End: 2023-01-15 | Stop reason: HOSPADM

## 2023-01-14 RX ORDER — NITROGLYCERIN 0.4 MG/1
0.4 TABLET SUBLINGUAL
Status: DISCONTINUED | OUTPATIENT
Start: 2023-01-14 | End: 2023-01-15 | Stop reason: HOSPADM

## 2023-01-14 RX ORDER — ONDANSETRON 2 MG/ML
4 INJECTION INTRAMUSCULAR; INTRAVENOUS EVERY 6 HOURS PRN
Status: DISCONTINUED | OUTPATIENT
Start: 2023-01-14 | End: 2023-01-15 | Stop reason: HOSPADM

## 2023-01-14 RX ORDER — ASPIRIN 81 MG/1
81 TABLET ORAL DAILY
Status: DISCONTINUED | OUTPATIENT
Start: 2023-01-14 | End: 2023-01-15 | Stop reason: HOSPADM

## 2023-01-14 RX ORDER — INSULIN LISPRO 100 [IU]/ML
2-9 INJECTION, SOLUTION INTRAVENOUS; SUBCUTANEOUS
Status: DISCONTINUED | OUTPATIENT
Start: 2023-01-14 | End: 2023-01-15 | Stop reason: HOSPADM

## 2023-01-14 RX ORDER — ATORVASTATIN CALCIUM 40 MG/1
40 TABLET, FILM COATED ORAL DAILY
Status: DISCONTINUED | OUTPATIENT
Start: 2023-01-14 | End: 2023-01-15 | Stop reason: HOSPADM

## 2023-01-14 RX ORDER — CLOPIDOGREL BISULFATE 75 MG/1
75 TABLET ORAL DAILY
Status: DISCONTINUED | OUTPATIENT
Start: 2023-01-14 | End: 2023-01-15 | Stop reason: HOSPADM

## 2023-01-14 RX ORDER — ENOXAPARIN SODIUM 100 MG/ML
40 INJECTION SUBCUTANEOUS DAILY
Status: DISCONTINUED | OUTPATIENT
Start: 2023-01-14 | End: 2023-01-15 | Stop reason: HOSPADM

## 2023-01-14 RX ORDER — SODIUM CHLORIDE 0.9 % (FLUSH) 0.9 %
10 SYRINGE (ML) INJECTION AS NEEDED
Status: DISCONTINUED | OUTPATIENT
Start: 2023-01-14 | End: 2023-01-15 | Stop reason: HOSPADM

## 2023-01-14 RX ORDER — OLANZAPINE 10 MG/2ML
1 INJECTION, POWDER, LYOPHILIZED, FOR SOLUTION INTRAMUSCULAR
Status: DISCONTINUED | OUTPATIENT
Start: 2023-01-14 | End: 2023-01-15 | Stop reason: HOSPADM

## 2023-01-14 RX ORDER — SODIUM CHLORIDE 9 MG/ML
40 INJECTION, SOLUTION INTRAVENOUS AS NEEDED
Status: DISCONTINUED | OUTPATIENT
Start: 2023-01-14 | End: 2023-01-15 | Stop reason: HOSPADM

## 2023-01-14 RX ORDER — HYDROCHLOROTHIAZIDE 12.5 MG/1
12.5 TABLET ORAL
Status: DISCONTINUED | OUTPATIENT
Start: 2023-01-14 | End: 2023-01-15 | Stop reason: HOSPADM

## 2023-01-14 RX ORDER — FAMOTIDINE 10 MG/ML
20 INJECTION, SOLUTION INTRAVENOUS ONCE
Status: COMPLETED | OUTPATIENT
Start: 2023-01-14 | End: 2023-01-14

## 2023-01-14 RX ORDER — SODIUM CHLORIDE 0.9 % (FLUSH) 0.9 %
10 SYRINGE (ML) INJECTION EVERY 12 HOURS SCHEDULED
Status: DISCONTINUED | OUTPATIENT
Start: 2023-01-14 | End: 2023-01-15 | Stop reason: HOSPADM

## 2023-01-14 RX ORDER — ISOSORBIDE MONONITRATE 30 MG/1
30 TABLET, EXTENDED RELEASE ORAL
Status: DISCONTINUED | OUTPATIENT
Start: 2023-01-14 | End: 2023-01-15 | Stop reason: HOSPADM

## 2023-01-14 RX ORDER — DEXTROSE MONOHYDRATE 25 G/50ML
25 INJECTION, SOLUTION INTRAVENOUS
Status: DISCONTINUED | OUTPATIENT
Start: 2023-01-14 | End: 2023-01-15 | Stop reason: HOSPADM

## 2023-01-14 RX ORDER — LISINOPRIL 5 MG/1
10 TABLET ORAL
Status: DISCONTINUED | OUTPATIENT
Start: 2023-01-14 | End: 2023-01-15 | Stop reason: HOSPADM

## 2023-01-14 RX ORDER — NICOTINE POLACRILEX 4 MG
15 LOZENGE BUCCAL
Status: DISCONTINUED | OUTPATIENT
Start: 2023-01-14 | End: 2023-01-15 | Stop reason: HOSPADM

## 2023-01-14 RX ORDER — SODIUM CHLORIDE 0.9 % (FLUSH) 0.9 %
3 SYRINGE (ML) INJECTION EVERY 12 HOURS SCHEDULED
Status: DISCONTINUED | OUTPATIENT
Start: 2023-01-14 | End: 2023-01-15 | Stop reason: HOSPADM

## 2023-01-14 RX ORDER — SODIUM CHLORIDE 0.9 % (FLUSH) 0.9 %
3-10 SYRINGE (ML) INJECTION AS NEEDED
Status: DISCONTINUED | OUTPATIENT
Start: 2023-01-14 | End: 2023-01-15 | Stop reason: HOSPADM

## 2023-01-14 RX ORDER — ONDANSETRON 4 MG/1
4 TABLET, FILM COATED ORAL EVERY 6 HOURS PRN
Status: DISCONTINUED | OUTPATIENT
Start: 2023-01-14 | End: 2023-01-15 | Stop reason: HOSPADM

## 2023-01-14 RX ADMIN — ASPIRIN 81 MG CHEWABLE TABLET 324 MG: 81 TABLET CHEWABLE at 02:52

## 2023-01-14 RX ADMIN — ALUMINUM HYDROXIDE, MAGNESIUM HYDROXIDE, AND DIMETHICONE: 400; 400; 40 SUSPENSION ORAL at 03:55

## 2023-01-14 RX ADMIN — Medication 10 ML: at 21:14

## 2023-01-14 RX ADMIN — LISINOPRIL 10 MG: 5 TABLET ORAL at 10:52

## 2023-01-14 RX ADMIN — FAMOTIDINE 20 MG: 10 INJECTION INTRAVENOUS at 03:55

## 2023-01-14 RX ADMIN — ISOSORBIDE MONONITRATE 30 MG: 30 TABLET, EXTENDED RELEASE ORAL at 12:11

## 2023-01-14 RX ADMIN — Medication 3 ML: at 21:14

## 2023-01-14 RX ADMIN — CLOPIDOGREL BISULFATE 75 MG: 75 TABLET ORAL at 10:52

## 2023-01-14 RX ADMIN — ENOXAPARIN SODIUM 40 MG: 100 INJECTION SUBCUTANEOUS at 16:10

## 2023-01-14 RX ADMIN — HYDROCHLOROTHIAZIDE 12.5 MG: 12.5 TABLET ORAL at 10:53

## 2023-01-14 RX ADMIN — EMPAGLIFLOZIN 25 MG: 25 TABLET, FILM COATED ORAL at 12:11

## 2023-01-14 RX ADMIN — ASPIRIN 81 MG: 81 TABLET, COATED ORAL at 10:52

## 2023-01-14 NOTE — ED NOTES
Nursing report ED to floor  Glenroy Penaloza  57 y.o.  male    HPI:   Chief Complaint   Patient presents with    Chest Pain       Admitting doctor:   Claudio Walters MD    Admitting diagnosis:   The encounter diagnosis was Chest pain, unspecified type.    Code status:   Current Code Status       Date Active Code Status Order ID Comments User Context       Prior            Allergies:   Patient has no known allergies.    Isolation:  No active isolations     Fall Risk:  Fall Risk Assessment was completed, and patient is at low risk for falls.   Predictive Model Details         11 (Low) Factor Value    Calculated 1/14/2023 06:07 Age 57    Risk of Fall Model Musculoskeletal Assessment WDL     Active Peripheral IV Present     Respiratory Rate 18     Skin Assessment WDL     Magnesium not on file     Number of Distinct Medication Classes administered 4     Drug Use No     Be Scale not on file     Financial Class Private Insurance     Diastolic BP 76     Peripheral Vascular Assessment WDL     Cardiac Assessment X     Chloride 100 mmol/L     Sex Male     Number of administrations of Ulcer Drugs 1     Gastrointestinal Assessment WDL     Calcium 9.2 mg/dL     ALT 33 U/L     Days after Admission 0.147     Creatinine 0.91 mg/dL     Albumin 4.5 g/dL     Potassium 4 mmol/L     Total Bilirubin 0.3 mg/dL         Weight:       01/13/23 2138   Weight: 91.8 kg (202 lb 6.1 oz)       Intake and Output  No intake or output data in the 24 hours ending 01/14/23 0622    Diet:        Most recent vitals:   Vitals:    01/14/23 0333 01/14/23 0348 01/14/23 0403 01/14/23 0418   BP: 113/77 117/82 124/79 115/76   Pulse: 81 81 79 75   Resp:       Temp:       TempSrc:       SpO2: 94% 95% 94% 95%   Weight:       Height:           Active LDAs/IV Access:   Lines, Drains & Airways       Active LDAs       Name Placement date Placement time Site Days    Peripheral IV 01/13/23 2223 Anterior;Left Forearm 01/13/23 2223  Forearm  less than 1                     Skin Condition:   Skin Assessments (last day)       Date/Time Skin L    01/14/23 0250 [facility]             Labs (abnormal labs have a star):   Labs Reviewed   COMPREHENSIVE METABOLIC PANEL - Abnormal; Notable for the following components:       Result Value    Glucose 120 (*)     All other components within normal limits    Narrative:     GFR Normal >60  Chronic Kidney Disease <60  Kidney Failure <15     CBC WITH AUTO DIFFERENTIAL - Abnormal; Notable for the following components:    Neutrophil % 38.4 (*)     Lymphocyte % 46.0 (*)     All other components within normal limits   APTT - Abnormal; Notable for the following components:    PTT 26.7 (*)     All other components within normal limits   TROPONIN (IN-HOUSE) - Normal    Narrative:     Troponin T Reference Range:  <= 0.03 ng/mL-   Negative for AMI  >0.03 ng/mL-     Abnormal for myocardial necrosis.  Clinicians would have to utilize clinical acumen, EKG, Troponin and serial changes to determine if it is an Acute Myocardial Infarction or myocardial injury due to an underlying chronic condition.       Results may be falsely decreased if patient taking Biotin.     TROPONIN (IN-HOUSE) - Normal    Narrative:     Troponin T Reference Range:  <= 0.03 ng/mL-   Negative for AMI  >0.03 ng/mL-     Abnormal for myocardial necrosis.  Clinicians would have to utilize clinical acumen, EKG, Troponin and serial changes to determine if it is an Acute Myocardial Infarction or myocardial injury due to an underlying chronic condition.       Results may be falsely decreased if patient taking Biotin.     PROTIME-INR - Normal   CBC AND DIFFERENTIAL    Narrative:     The following orders were created for panel order CBC & Differential.  Procedure                               Abnormality         Status                     ---------                               -----------         ------                     CBC Auto Differential[[id number]]        Abnormal            Final result                  Please view results for these tests on the individual orders.       LOC: Person, Place, Time, and Situation    Telemetry:  Observation Unit    Cardiac Monitoring Ordered: yes    EKG:   ECG 12 Lead Chest Pain   Preliminary Result   HEART RATE= 75  bpm   RR Interval= 796  ms   CA Interval= 185  ms   P Horizontal Axis= 19  deg   P Front Axis= 56  deg   QRSD Interval= 98  ms   QT Interval= 392  ms   QRS Axis= 8  deg   T Wave Axis= 43  deg   - NORMAL ECG -   Sinus rhythm   When compared with ECG of 09-Jun-2022 8:14:28,   No significant change   Electronically Signed By:    Date and Time of Study: 2023-01-13 21:48:16          Medications Given in the ED:   Medications   sodium chloride 0.9 % flush 10 mL (has no administration in time range)   aspirin chewable tablet 324 mg (324 mg Oral Given 1/14/23 0252)   GI cocktail ( Oral Given 1/14/23 0355)   famotidine (PEPCID) injection 20 mg (20 mg Intravenous Given 1/14/23 0355)       Imaging results:  XR Chest 1 View    Result Date: 1/13/2023  Impression: No active cardiopulmonary disease Electronically Signed: Augustus Yeh  1/13/2023 10:55 PM EST  Workstation ID: [id number]     Social issues:   Social History     Socioeconomic History    Marital status:    Tobacco Use    Smoking status: Never     Passive exposure: Never    Smokeless tobacco: Former     Quit date: 8/1/2021    Tobacco comments:     Smokeless tobacco 40 years until 8/1/21   Vaping Use    Vaping Use: Never used   Substance and Sexual Activity    Alcohol use: Yes     Alcohol/week: 10.0 standard drinks     Types: 10 Cans of beer per week     Comment: current some day     Drug use: Never    Sexual activity: Yes     Partners: Female     Birth control/protection: None       NIH Stroke Scale:  Interval: (not recorded)  1a. Level of Consciousness: (not recorded)  1b. LOC Questions: (not recorded)  1c. LOC Commands: (not recorded)  2. Best Gaze: (not recorded)  3. Visual: (not recorded)  4. Facial Palsy:  (not recorded)  5a. Motor Arm, Left: (not recorded)  5b. Motor Arm, Right: (not recorded)  6a. Motor Leg, Left: (not recorded)  6b. Motor Leg, Right: (not recorded)  7. Limb Ataxia: (not recorded)  8. Sensory: (not recorded)  9. Best Language: (not recorded)  10. Dysarthria: (not recorded)  11. Extinction and Inattention (formerly Neglect): (not recorded)    Total (NIH Stroke Scale): (not recorded)     Additional notable assessment information:     Nursing report ED to floor:    Dianna Gillis RN   01/14/23 06:22 EST

## 2023-01-14 NOTE — LETTER
January 15, 2023     Patient: Glenroy Penaloza   YOB: 1965   Date of Visit: 1/13/2023       To Whom It May Concern:    It is my medical opinion that Glenroy Penaloza may return to work on January 22, 2023 without any restriction.           Sincerely,        Sameer Schwartz MD

## 2023-01-14 NOTE — PLAN OF CARE
Goal Outcome Evaluation:  Plan of Care Reviewed With: patient        Progress: improving  Outcome Evaluation: plan for heart cath on monday per cardiology team. Will continue to follow plan of care.

## 2023-01-14 NOTE — Clinical Note
Allergies reviewed.  H&P note has been confirmed for the patient. Procedural consent has been signed.  Staff has reviewed the patient's labs.  Labs have been reviewed and are in within normal limits,

## 2023-01-14 NOTE — Clinical Note
Hemostasis started on the right femoral artery. Mynx was used in achieving hemostasis. Closure device deployed in the vessel. Hemostasis achieved successfully.

## 2023-01-14 NOTE — ED PROVIDER NOTES
Subjective        Provider in Triage Note  Patient is a 57-year-old male comes in complaining of chest pain for the past 4 days.  Patient states his pain goes from the center of his chest to his left shoulder and arm.  Patient had a stress test earlier in the week that showed a low risk study.  Patient otherwise denies any cardiac history.        History of Present Illness    Review of Systems   Constitutional: Negative.    Respiratory: Negative.    Cardiovascular: Positive for chest pain.   Gastrointestinal: Negative.        Past Medical History:   Diagnosis Date   • Atypical rash    • Diabetes mellitus, type 2 (HCC)    • Disorder of male genital organs    • Elevated glucose    • Essential hypertension 07/30/2020   • Glucosuria    • Hyperlipidemia    • Stroke (HCC) 6/9/22   • TIA (transient ischemic attack)        No Known Allergies    Past Surgical History:   Procedure Laterality Date   • COLONOSCOPY N/A 04/04/2022    Procedure: COLONOSCOPY WITH POLYPECTOMY;  Surgeon: Arsen Payan MD;  Location: [facility] ENDOSCOPY;  Service: Gastroenterology;  Laterality: N/A;  COLON POLYP, HEMORRHOIDS       Family History   Problem Relation Age of Onset   • Cancer Mother    • Cancer Father    • Cancer Maternal Uncle    • Cancer Other    • Colon cancer Neg Hx    • Malig Hyperthermia Neg Hx        Social History     Socioeconomic History   • Marital status:    Tobacco Use   • Smoking status: Never     Passive exposure: Never   • Smokeless tobacco: Former     Quit date: 8/1/2021   • Tobacco comments:     Smokeless tobacco 40 years until 8/1/21   Vaping Use   • Vaping Use: Never used   Substance and Sexual Activity   • Alcohol use: Yes     Alcohol/week: 10.0 standard drinks     Types: 10 Cans of beer per week     Comment: current some day    • Drug use: Never   • Sexual activity: Yes     Partners: Female     Birth control/protection: None           Objective   Physical Exam  Constitutional:       Appearance: Normal  appearance. He is well-developed.      Comments: Patient asleep upon my initial evaluation, exam status post arousal   HENT:      Head: Normocephalic and atraumatic.      Mouth/Throat:      Mouth: Mucous membranes are moist.      Pharynx: Oropharynx is clear.   Eyes:      Conjunctiva/sclera: Conjunctivae normal.      Pupils: Pupils are equal, round, and reactive to light.   Cardiovascular:      Rate and Rhythm: Normal rate and regular rhythm.      Heart sounds: Normal heart sounds.   Pulmonary:      Effort: Pulmonary effort is normal.      Breath sounds: Normal breath sounds.   Abdominal:      General: Bowel sounds are normal. There is no distension.      Palpations: Abdomen is soft.      Tenderness: There is no abdominal tenderness.   Musculoskeletal:         General: No swelling or tenderness. Normal range of motion.   Skin:     General: Skin is warm and dry.      Capillary Refill: Capillary refill takes less than 2 seconds.   Neurological:      General: No focal deficit present.      Mental Status: He is alert and oriented to person, place, and time.   Psychiatric:         Mood and Affect: Mood normal.         Behavior: Behavior normal.         Procedures           ED Course  ED Course as of 01/14/23 0514   Sat Jan 14, 2023   0331 EKG interpretation: Normal sinus rhythm, rate 75, no acute ST change [JR]      ED Course User Index  [JR] Claudio Walters MD                                           Medical Decision Making  57-year-old male with mild left-sided chest pressure, nonexertional, no associated dyspnea, nausea, sweats intermittent over the past week.  Patient had negative stress test 2 days ago.    Case discussed with Dr. Alaniz, who requests admission for catheterization, will place in ED observation for now.    Chest pain, unspecified type: acute illness or injury  Amount and/or Complexity of Data Reviewed  Labs: ordered.     Details: Normal troponin  Radiology: ordered.     Details:  Negative  ECG/medicine tests: ordered.     Details: Negative      Risk  Prescription drug management.  Decision regarding hospitalization.          Final diagnoses:   Chest pain, unspecified type       ED Disposition  ED Disposition     ED Disposition   Decision to Admit    Condition   --    Comment   --             No follow-up provider specified.       Medication List      No changes were made to your prescriptions during this visit.          Claudio Walters MD  01/14/23 0514

## 2023-01-14 NOTE — H&P
[facility] Observation Unit H&P    Patient Name: Glenroy Penaloza  : 1965  MRN: [id number]  Primary Care Physician: Cesar Pruitt MD  Date of admission: 2023     Patient Care Team:  Cesar Pruitt MD as PCP - General (Family Medicine)          Subjective   History Present Illness     Chief Complaint:   Chief Complaint   Patient presents with   • Chest Pain       History of Present Illness  Obtained from admitting physician HPI on 2023:  Patient is a 57-year-old male comes in complaining of chest pain for the past 4 days.  Patient states his pain goes from the center of his chest to his left shoulder and arm.  Patient had a stress test earlier in the week that showed a low risk study.  Patient otherwise denies any cardiac history.    2023 (postobservation admission):  Patient confirms the HPI noted above including several day history of near constant left-sided chest pain described as a pressure/tightness which radiates towards his left arm.  He denies any dyspnea, nausea or vomiting, cough or fever or peripheral edema and confirms that he had a recent low risk stress test but symptoms have remained present since that time.  Family history is notable for her brother who required CABG at approximately the same age the patient is currently.      Review of Systems   Constitutional: Negative.   HENT: Negative.    Eyes: Negative.    Cardiovascular: Positive for chest pain.   Respiratory: Negative.    Skin: Negative.    Musculoskeletal: Negative.    Gastrointestinal: Negative.    Genitourinary: Negative.    Neurological: Negative.    Psychiatric/Behavioral: Negative.            Personal History     Past Medical History:   Past Medical History:   Diagnosis Date   • Atypical rash    • Diabetes mellitus, type 2 (HCC)    • Disorder of male genital organs    • Elevated glucose    • Essential hypertension 2020   • Glucosuria    • Hyperlipidemia    • Stroke (HCC) 22   • TIA (transient ischemic  attack)        Surgical History:      Past Surgical History:   Procedure Laterality Date   • COLONOSCOPY N/A 04/04/2022    Procedure: COLONOSCOPY WITH POLYPECTOMY;  Surgeon: Arsen Payan MD;  Location: [facility] ENDOSCOPY;  Service: Gastroenterology;  Laterality: N/A;  COLON POLYP, HEMORRHOIDS           Family History: family history includes Cancer in his father, maternal uncle, mother, and another family member; Heart disease (age of onset: 58) in his brother. Otherwise pertinent FHx was reviewed and unremarkable.     Social History:  reports that he has never smoked. He has never been exposed to tobacco smoke. He quit smokeless tobacco use about 17 months ago. He reports current alcohol use of about 10.0 standard drinks per week. He reports that he does not use drugs.      Medications:  Prior to Admission medications    Medication Sig Start Date End Date Taking? Authorizing Provider   aspirin (Aspirin Low Dose) 81 MG EC tablet Take 1 tablet by mouth Daily. 8/9/22  Yes Cesar Pruitt MD   atorvastatin (LIPITOR) 40 MG tablet Take 1 tablet by mouth Daily. 1/3/23  Yes Lisa Mayes DO   Jardiance 25 MG tablet tablet TAKE 1 TABLET BY MOUTH EVERY DAY 9/27/22  Yes Cesar Pruitt MD   lisinopril-hydrochlorothiazide (PRINZIDE,ZESTORETIC) 10-12.5 MG per tablet TAKE ONE TABLET BY MOUTH EVERY DAY 10/5/22  Yes Rosie Vela APRN   metFORMIN ER (GLUCOPHAGE-XR) 500 MG 24 hr tablet Take 2 tablets by mouth 2 (Two) Times a Day. 7/12/22  Yes Cesar Pruitt MD   glucose blood test strip 100 each by Other route Daily. Use as instructed Dx code: E11.65 6/10/22   Keshav Cesar APRN   Microlet Lancets misc 1 each Daily. Dx code: E11.65 6/10/22   Nicanor Phipps MD       Allergies:  No Known Allergies    Objective   Objective     Vital Signs  Temp:  [97.7 °F (36.5 °C)-98.2 °F (36.8 °C)] 98.1 °F (36.7 °C)  Heart Rate:  [69-82] 74  Resp:  [11-18] 16  BP: (113-130)/(70-85) 116/72  SpO2:  [94 %-97  %] 95 %  on   ;   Device (Oxygen Therapy): room air  Body mass index is 30.77 kg/m².    Physical Exam  Vitals reviewed.   Constitutional:       General: He is not in acute distress.     Appearance: Normal appearance. He is obese. He is not ill-appearing, toxic-appearing or diaphoretic.   HENT:      Head: Normocephalic.      Right Ear: External ear normal.      Left Ear: External ear normal.      Nose: Nose normal.      Mouth/Throat:      Mouth: Mucous membranes are moist.   Eyes:      Extraocular Movements: Extraocular movements intact.   Cardiovascular:      Rate and Rhythm: Normal rate and regular rhythm.      Pulses: Normal pulses.      Heart sounds: Normal heart sounds.   Pulmonary:      Effort: Pulmonary effort is normal.      Breath sounds: Normal breath sounds.   Abdominal:      General: Bowel sounds are normal.      Palpations: Abdomen is soft.      Tenderness: There is no abdominal tenderness.   Musculoskeletal:         General: Normal range of motion.      Cervical back: Normal range of motion.      Right lower leg: No edema.      Left lower leg: No edema.   Skin:     General: Skin is warm and dry.      Capillary Refill: Capillary refill takes less than 2 seconds.   Neurological:      General: No focal deficit present.      Mental Status: He is alert and oriented to person, place, and time.   Psychiatric:         Mood and Affect: Mood normal.         Behavior: Behavior normal.         Thought Content: Thought content normal.         Judgment: Judgment normal.           Results Review:  I have personally reviewed most recent cardiac tracings, lab results and radiology images and interpretations and agree with findings, most notably: Troponin, BMP, CBC, INR/PT, PTT, chest x-ray and EKG.    Results from last 7 days   Lab Units 01/14/23  0545 01/13/23  2222   WBC 10*3/mm3  --  5.60   HEMOGLOBIN g/dL  --  14.4   HEMATOCRIT %  --  40.9   PLATELETS 10*3/mm3  --  163   INR  0.97  --      Results from last 7 days    Lab Units 01/14/23  0049 01/13/23  2222   SODIUM mmol/L  --  136   POTASSIUM mmol/L  --  4.0   CHLORIDE mmol/L  --  100   CO2 mmol/L  --  24.0   BUN mg/dL  --  17   CREATININE mg/dL  --  0.91   GLUCOSE mg/dL  --  120*   CALCIUM mg/dL  --  9.2   ALT (SGPT) U/L  --  33   AST (SGOT) U/L  --  29   TROPONIN T ng/mL <0.010 <0.010     Estimated Creatinine Clearance: 97.4 mL/min (by C-G formula based on SCr of 0.91 mg/dL).  Brief Urine Lab Results  (Last result in the past 365 days)      Color   Clarity   Blood   Leuk Est   Nitrite   Protein   CREAT   Urine HCG        01/22/22 1147 Dark Yellow   Cloudy   Negative   Negative   Negative   30 mg/dL (1+)                 Microbiology Results (last 10 days)     ** No results found for the last 240 hours. **          ECG/EMG Results (most recent)     Procedure Component Value Units Date/Time    ECG 12 Lead Chest Pain [[id number]] Collected: 01/13/23 2148     Updated: 01/13/23 2151     QT Interval 392 ms     Narrative:      HEART RATE= 75  bpm  RR Interval= 796  ms  VA Interval= 185  ms  P Horizontal Axis= 19  deg  P Front Axis= 56  deg  QRSD Interval= 98  ms  QT Interval= 392  ms  QRS Axis= 8  deg  T Wave Axis= 43  deg  - NORMAL ECG -  Sinus rhythm  When compared with ECG of 09-Jun-2022 8:14:28,  No significant change  Electronically Signed By:   Date and Time of Study: 2023-01-13 21:48:16              Results for orders placed during the hospital encounter of 06/09/22    Adult Transthoracic Echo Complete W/ Cont if Necessary Per Protocol    Interpretation Summary  · Left ventricular systolic function is normal.  · Left ventricular ejection fraction is 55 to 60%.  · Left ventricular diastolic function is consistent with (grade I) impaired relaxation.  · Saline test results are negative.      XR Chest 1 View    Result Date: 1/13/2023  Impression: No active cardiopulmonary disease Electronically Signed: Augustus Yeh  1/13/2023 10:55 PM EST  Workstation ID:  [id number]        Estimated Creatinine Clearance: 97.4 mL/min (by C-G formula based on SCr of 0.91 mg/dL).    Assessment & Plan   Assessment/Plan       Active Hospital Problems    Diagnosis  POA   • **Chest pain [R07.9]  Yes   • Class 1 obesity [E66.9]  Yes   • LANDRY on CPAP [G47.33, Z99.89]  Not Applicable   • History of stroke [Z86.73]  Not Applicable   • Hyperlipidemia [E78.5]  Yes   • Diabetes mellitus, type 2 (HCC) [E11.9]  Yes   • Essential hypertension [I10]  Yes      Resolved Hospital Problems   No resolved problems to display.     Chest pain  Lab Results   Component Value Date    TROPONINT <0.010 01/14/2023    TROPONINT <0.010 01/13/2023    TROPONINT <0.010 06/09/2022   -Chest Xray: Showed no active cardiopulmonary process  -EKG showed sinus rhythm at 75 without obvious acute changes or ectopy and a QTC of 439 ms  -In the ED pt given 324 mg aspirin along with Pepcid and GI cocktail  -Check lipid panel  -Stress Test from 1/11/2023 reported with normal ECG stress test, fixed anterior apical defect consistent with prior MI but without reversibility consistent with a low risk study  -Cardiology consulted who recommended initiation of Plavix in addition to his aspirin as well as Imdur with cardiac catheterization planned on either 1/15 or 1/16 as scheduling allows  -Telemetry  -Hospitalist consulted    Diabetes mellitus  -Well controlled   Lab Results   Component Value Date    GLUCOSE 120 (H) 01/13/2023    GLUCOSE 132 (H) 12/15/2022    GLUCOSE 151 (H) 09/17/2022    GLUCOSE 197 (H) 06/13/2022   -Continue Jardiance  -Hold metformin  -Correctional insulin ordered  -Diabetic diet  -Monitor AC and HS    Hypertension  -Well controlled   BP Readings from Last 1 Encounters:   01/14/23 116/72   - Continue lisinopril-hydrochlorothiazide  - Monitor while admitted    Hyperlipidemia  -Statin    Obesity (BMI: 30.77)  -Encourage diet lifestyle modification        VTE Prophylaxis -   Mechanical Order History:     None       Pharmalogical Order History:     None          CODE STATUS:    There are no questions and answers to display.       This patient has been examined wearing personal protective equipment.     I discussed the patient's findings and my recommendations with patient and nursing staff.      Signature:Electronically signed by Jd Baumann PA-C, 01/14/23, 2:09 PM EST.

## 2023-01-14 NOTE — CONSULTS
Referring Provider: Sameer Schwartz MD    Reason for Consultation:  Chest pain      Patient Care Team:  Cesar Pruitt MD as PCP - General (Family Medicine)      SUBJECTIVE     Chief Complaint:  Chest pain    History of present illness:  Glenroy Penaloza is a 57 y.o. male with a history of CVA and known HTN, HLD, LANDRY and type 2 DM who presented to the ER at [facility] on 1/14/2023 complaining of chest pain. The patient states his pain started 4 days ago and has been persistent. He describes it as a pressure in the left side of his chest. He reports intermittent worsening of the pressure, but denies any known exacerbating or alleviating factors. He denies any associated symptoms. Of note, he had a recent stress test that showed a small, fixed anterior apical defect without reversibility. Workup in the ER revealed negative serial troponin and EKG without acute ischemia. He was admitted and cardiology was consulted for further evaluation.       Review of systems:    Constitutional: No weakness, fatigue, fever, rigors, chills   Eyes: No vision changes, eye pain   ENT/oropharynx: No difficulty swallowing, sore throat, epistaxis, changes in hearing   Cardiovascular: + chest pressure.  No palpitations, paroxysmal nocturnal dyspnea, orthopnea, diaphoresis, dizziness / syncopal episode   Respiratory: No shortness of breath, dyspnea on exertion, cough, wheezing, hemoptysis   Gastrointestinal: No abdominal pain, nausea, vomiting, diarrhea, bloody stools   Genitourinary: No hematuria, dysuria   Neurological: No headache, tremors, numbness, one-sided weakness    Musculoskeletal: No cramps, myalgias, joint pain, joint swelling   Integument: No rash, edema        Personal History:      Past Medical History:   Diagnosis Date   • Atypical rash    • Diabetes mellitus, type 2 (HCC)    • Disorder of male genital organs    • Elevated glucose    • Essential hypertension 07/30/2020   • Glucosuria    • Hyperlipidemia     • Stroke (HCC) 6/9/22   • TIA (transient ischemic attack)        Past Surgical History:   Procedure Laterality Date   • COLONOSCOPY N/A 04/04/2022    Procedure: COLONOSCOPY WITH POLYPECTOMY;  Surgeon: Arsen Payan MD;  Location: [facility] ENDOSCOPY;  Service: Gastroenterology;  Laterality: N/A;  COLON POLYP, HEMORRHOIDS       Family History   Problem Relation Age of Onset   • Cancer Mother    • Cancer Father    • Heart disease Brother 58        CABG   • Cancer Maternal Uncle    • Cancer Other    • Colon cancer Neg Hx    • Malig Hyperthermia Neg Hx        Social History     Tobacco Use   • Smoking status: Never     Passive exposure: Never   • Smokeless tobacco: Former     Quit date: 8/1/2021   • Tobacco comments:     Smokeless tobacco 40 years until 8/1/21   Vaping Use   • Vaping Use: Never used   Substance Use Topics   • Alcohol use: Yes     Alcohol/week: 10.0 standard drinks     Types: 10 Cans of beer per week     Comment: current some day    • Drug use: Never        Medications Prior to Admission   Medication Sig Dispense Refill Last Dose   • aspirin (Aspirin Low Dose) 81 MG EC tablet Take 1 tablet by mouth Daily. 90 tablet 1 1/13/2023   • atorvastatin (LIPITOR) 40 MG tablet Take 1 tablet by mouth Daily. 90 tablet 1 1/13/2023   • Jardiance 25 MG tablet tablet TAKE 1 TABLET BY MOUTH EVERY DAY 90 tablet 1 1/13/2023   • lisinopril-hydrochlorothiazide (PRINZIDE,ZESTORETIC) 10-12.5 MG per tablet TAKE ONE TABLET BY MOUTH EVERY DAY 90 tablet 0 1/13/2023   • metFORMIN ER (GLUCOPHAGE-XR) 500 MG 24 hr tablet Take 2 tablets by mouth 2 (Two) Times a Day. 360 tablet 1 1/13/2023   • glucose blood test strip 100 each by Other route Daily. Use as instructed Dx code: E11.65 100 each 2    • Microlet Lancets misc 1 each Daily. Dx code: E11.65 100 each 2         Allergies:     Patient has no known allergies.    Scheduled Meds:aspirin, 81 mg, Oral, Daily  atorvastatin, 40 mg, Oral, Daily  clopidogrel, 75 mg, Oral,  "Daily  empagliflozin, 25 mg, Oral, Daily  enoxaparin, 40 mg, Subcutaneous, Daily  lisinopril, 10 mg, Oral, Q24H   And  hydroCHLOROthiazide, 12.5 mg, Oral, Q24H  insulin lispro, 2-9 Units, Subcutaneous, TID With Meals  isosorbide mononitrate, 30 mg, Oral, Q24H  sodium chloride, 10 mL, Intravenous, Q12H  sodium chloride, 3 mL, Intravenous, Q12H      Continuous Infusions:   PRN Meds:•  dextrose  •  dextrose  •  glucagon (human recombinant)  •  melatonin  •  nitroglycerin  •  ondansetron **OR** ondansetron  •  sodium chloride  •  sodium chloride  •  sodium chloride  •  sodium chloride  •  sodium chloride      OBJECTIVE    Vital Signs  Vitals:    01/14/23 1521 01/14/23 1740 01/14/23 2245 01/15/23 0545   BP: 100/62 98/68 95/54 90/50   BP Location: Right arm Right arm Right arm Right arm   Patient Position: Lying Lying Lying Lying   Pulse: 74  66 63   Resp: 16 16 16 16   Temp: 98.3 °F (36.8 °C) 98 °F (36.7 °C) 97.9 °F (36.6 °C) 97.8 °F (36.6 °C)   TempSrc: Oral Oral Oral Oral   SpO2: 93% 94% 94% 96%   Weight:       Height:           Flowsheet Rows    Flowsheet Row First Filed Value   Admission Height 172.7 cm (68\") Documented at 01/13/2023 2138   Admission Weight 91.8 kg (202 lb 6.1 oz) Documented at 01/13/2023 2138            Intake/Output Summary (Last 24 hours) at 1/15/2023 0829  Last data filed at 1/14/2023 1739  Gross per 24 hour   Intake 462 ml   Output --   Net 462 ml        Telemetry:  Sinus rhythm     Physical Exam:  The patient is alert, oriented and in no distress.  Vital signs as noted above.  Head and neck revealed no carotid bruits or jugular venous distention.  No thyromegaly or lymph adenopathy is present  Lungs clear.  No wheezing.  Breath sounds are normal bilaterally.  Heart normal first and second heart sounds. No murmur.  No precordial rub is present.  No gallop is present.  Abdomen soft and nontender.  No organomegaly is present.  Extremities with good peripheral pulses without any pedal edema.  Skin " warm and dry.  Musculoskeletal system is grossly normal.  CNS grossly normal.       Results Review:  I have personally reviewed the results from the time of this admission to 1/15/2023 08:29 EST and agree with these findings:  [x]  Laboratory  []  Microbiology  [x]  Radiology  [x]  EKG/Telemetry   [x]  Cardiology/Vascular   []  Pathology  [x]  Old records  []  Other:    Most notable findings include:     Lab Results (last 24 hours)     Procedure Component Value Units Date/Time    POC Glucose Once [[id number]]  (Abnormal) Collected: 01/15/23 0704    Specimen: Blood Updated: 01/15/23 0705     Glucose 111 mg/dL      Comment: Serial Number: [id number]:  [id number]       Extra Tubes [[id number]] Collected: 01/15/23 0421    Specimen: Blood from Arm, Right Updated: 01/15/23 0530    Narrative:      The following orders were created for panel order Extra Tubes.  Procedure                               Abnormality         Status                     ---------                               -----------         ------                     Light Blue Top[[id number]]                                   Final result                 Please view results for these tests on the individual orders.    Light Blue Top [[id number]] Collected: 01/15/23 0421    Specimen: Blood from Arm, Right Updated: 01/15/23 0530     Extra Tube Hold for add-ons.     Comment: Auto resulted       Lipid Panel [[id number]]  (Abnormal) Collected: 01/15/23 0421    Specimen: Blood from Arm, Right Updated: 01/15/23 0528     Total Cholesterol 145 mg/dL      Triglycerides 204 mg/dL      HDL Cholesterol 41 mg/dL      LDL Cholesterol  70 mg/dL      VLDL Cholesterol 34 mg/dL      LDL/HDL Ratio 1.54    Narrative:      Cholesterol Reference Ranges  (U.S. Department of Health and Human Services ATP III Classifications)    Desirable          <200 mg/dL  Borderline High    200-239 mg/dL  High Risk          >240 mg/dL      Triglyceride Reference Ranges  (U.S. Department of  Health and Human Services ATP III Classifications)    Normal           <150 mg/dL  Borderline High  150-199 mg/dL  High             200-499 mg/dL  Very High        >500 mg/dL    HDL Reference Ranges  (U.S. Department of Health and Human Services ATP III Classifications)    Low     <40 mg/dl (major risk factor for CHD)  High    >60 mg/dl ('negative' risk factor for CHD)        LDL Reference Ranges  (U.S. Department of Health and Human Services ATP III Classifications)    Optimal          <100 mg/dL  Near Optimal     100-129 mg/dL  Borderline High  130-159 mg/dL  High             160-189 mg/dL  Very High        >189 mg/dL    Hemoglobin A1c [[id number]] Collected: 01/15/23 0421    Specimen: Blood from Arm, Right Updated: 01/15/23 0455    POC Glucose Once [[id number]]  (Abnormal) Collected: 01/14/23 1623    Specimen: Blood Updated: 01/14/23 1624     Glucose 135 mg/dL      Comment: Serial Number: [id number]:  [id number]       POC Glucose Once [[id number]]  (Normal) Collected: 01/14/23 1145    Specimen: Blood Updated: 01/14/23 1147     Glucose 105 mg/dL      Comment: Serial Number: [id number]:  [id number]             Imaging Results (Last 24 Hours)     ** No results found for the last 24 hours. **          LAB RESULTS (LAST 7 DAYS)    CBC  Results from last 7 days   Lab Units 01/13/23  2222   WBC 10*3/mm3 5.60   RBC 10*6/mm3 4.50   HEMOGLOBIN g/dL 14.4   HEMATOCRIT % 40.9   MCV fL 91.0   PLATELETS 10*3/mm3 163       BMP  Results from last 7 days   Lab Units 01/13/23  2222   SODIUM mmol/L 136   POTASSIUM mmol/L 4.0   CHLORIDE mmol/L 100   CO2 mmol/L 24.0   BUN mg/dL 17   CREATININE mg/dL 0.91   GLUCOSE mg/dL 120*       CMP   Results from last 7 days   Lab Units 01/13/23  2222   SODIUM mmol/L 136   POTASSIUM mmol/L 4.0   CHLORIDE mmol/L 100   CO2 mmol/L 24.0   BUN mg/dL 17   CREATININE mg/dL 0.91   GLUCOSE mg/dL 120*   ALBUMIN g/dL 4.5   BILIRUBIN mg/dL 0.3   ALK PHOS U/L 55   AST (SGOT) U/L 29   ALT (SGPT) U/L 33        BNP        TROPONIN  Results from last 7 days   Lab Units 01/14/23  0049   TROPONIN T ng/mL <0.010       CoAg  Results from last 7 days   Lab Units 01/14/23  0545   INR  0.97   APTT seconds 26.7*       Creatinine Clearance  Estimated Creatinine Clearance: 97.4 mL/min (by C-G formula based on SCr of 0.91 mg/dL).    ABG          Radiology  XR Chest 1 View    Result Date: 1/13/2023  Impression: No active cardiopulmonary disease Electronically Signed: Augustus Rao  1/13/2023 10:55 PM EST  Workstation ID: [id number]        EKG  I personally viewed and interpreted the patient's EKG/Telemetry data:  ECG 12 Lead Chest Pain   Preliminary Result   HEART RATE= 75  bpm   RR Interval= 796  ms   MD Interval= 185  ms   P Horizontal Axis= 19  deg   P Front Axis= 56  deg   QRSD Interval= 98  ms   QT Interval= 392  ms   QRS Axis= 8  deg   T Wave Axis= 43  deg   - NORMAL ECG -   Sinus rhythm   When compared with ECG of 09-Jun-2022 8:14:28,   No significant change   Electronically Signed By:    Date and Time of Study: 2023-01-13 21:48:16            Echocardiogram:    Results for orders placed during the hospital encounter of 06/09/22    Adult Transthoracic Echo Complete W/ Cont if Necessary Per Protocol    Interpretation Summary  · Left ventricular systolic function is normal.  · Left ventricular ejection fraction is 55 to 60%.  · Left ventricular diastolic function is consistent with (grade I) impaired relaxation.  · Saline test results are negative.        Stress Test:  Results for orders placed during the hospital encounter of 01/11/23    Stress Test With Myocardial Perfusion One Day    Interpretation Summary  •  Low risk for ischemic heart disease.  •  Findings consistent with a normal ECG stress test.    Low risk study  Fixed anterior apical defect that small consistent with prior MI  No reversibility seen  Stress ECG did reach target heart rate with no ischemic findings, no arrhythmias seen  Normal blood pressure and heart  rate response to exercise  No chest pain or symptoms during the study  Summed difference score of 0 with a EF of 67%    Low risk study        Cardiac Catheterization:  No results found for this or any previous visit.        Other:      ASSESSMENT & PLAN:    Principal Problem:    Chest pain  Active Problems:    Diabetes mellitus, type 2 (HCC)    Essential hypertension    Hyperlipidemia    History of stroke    LANDRY on CPAP    Class 1 obesity    Chest pain  -ACS ruled out with negative serial troponin and EKG without acute ischemia  -recent stress test showed small fixed anterior apical defect consistent with prior MI, no reversibility seen  -plan cardiac catheterization on 1/15/23  -continue DAPT, statin, and Imdur     Diabetes mellitus, type 2  -check A1c  -continue Jardiance   -metformin on hold    Essential hypertension, chronic   -continue Imdur, and lisinopril-HCTZ    Hyperlipidemia  -check lipid panel  -continue atorvastatin     History of stroke  -continue DAPT and statin     LANDRY on CPAP  -continue nocturnal CPAP    Class 1 obesity  -BMI 30.77 on admission  -encourage lifestyle modifications     Patient is seen and examined and findings are verified.  All data is reviewed by me personally.  Assessment and plan formulated by APC was done after discussion with attending.  I spent more than 50% of time in taking care of the patient.    Patient is presented with chest pain.  He recently had a stress test which is negative for ischemia.    Normal S1 and S2.  No pericardial rub or murmur abdomen exam is benign.  No leg edema noted.    Patient is admitted with symptom of chest pain and it is ongoing.  His recent stress test was unremarkable.  After discussion with the patient he agreed to proceed with cardiac catheterization.  Patient has significant risk factor including premature coronary artery disease.  Further recommendation after results of cardiac catheterization    Electronically signed by Arsen Sweet MD,  01/15/23, 8:29 AM EST.      Electronically signed by JANETT Mas, 01/14/23, 1:09 PM EST.    Arsen Sweet MD  01/15/23  08:29 EST

## 2023-01-14 NOTE — NURSING NOTE
Pt is a new admit for chest pain. Pt denies chest during my initial assessment. Cardiology following. NPO for possible cardiac interventions. A&Ox4, uses cpap at home, up ad taco. Wife at bedside. Will continue to follo plan of care.

## 2023-01-15 ENCOUNTER — READMISSION MANAGEMENT (OUTPATIENT)
Dept: CALL CENTER | Facility: HOSPITAL | Age: 58
End: 2023-01-15
Payer: COMMERCIAL

## 2023-01-15 VITALS
HEIGHT: 68 IN | TEMPERATURE: 98 F | HEART RATE: 87 BPM | OXYGEN SATURATION: 94 % | WEIGHT: 202.38 LBS | DIASTOLIC BLOOD PRESSURE: 67 MMHG | BODY MASS INDEX: 30.67 KG/M2 | RESPIRATION RATE: 15 BRPM | SYSTOLIC BLOOD PRESSURE: 114 MMHG

## 2023-01-15 LAB
CHOLEST SERPL-MCNC: 145 MG/DL (ref 0–200)
GLUCOSE BLDC GLUCOMTR-MCNC: 111 MG/DL (ref 70–105)
GLUCOSE BLDC GLUCOMTR-MCNC: 118 MG/DL (ref 70–105)
HDLC SERPL-MCNC: 41 MG/DL (ref 40–60)
LDLC SERPL CALC-MCNC: 70 MG/DL (ref 0–100)
LDLC/HDLC SERPL: 1.54 {RATIO}
TRIGL SERPL-MCNC: 204 MG/DL (ref 0–150)
VLDLC SERPL-MCNC: 34 MG/DL (ref 5–40)
WHOLE BLOOD HOLD COAG: NORMAL

## 2023-01-15 PROCEDURE — 25010000002 MIDAZOLAM PER 1 MG: Performed by: INTERNAL MEDICINE

## 2023-01-15 PROCEDURE — 25010000002 FENTANYL CITRATE (PF) 100 MCG/2ML SOLUTION: Performed by: INTERNAL MEDICINE

## 2023-01-15 PROCEDURE — 83036 HEMOGLOBIN GLYCOSYLATED A1C: CPT | Performed by: NURSE PRACTITIONER

## 2023-01-15 PROCEDURE — 93458 L HRT ARTERY/VENTRICLE ANGIO: CPT | Performed by: INTERNAL MEDICINE

## 2023-01-15 PROCEDURE — C1769 GUIDE WIRE: HCPCS | Performed by: INTERNAL MEDICINE

## 2023-01-15 PROCEDURE — C1894 INTRO/SHEATH, NON-LASER: HCPCS | Performed by: INTERNAL MEDICINE

## 2023-01-15 PROCEDURE — 99152 MOD SED SAME PHYS/QHP 5/>YRS: CPT | Performed by: INTERNAL MEDICINE

## 2023-01-15 PROCEDURE — 0 IOPAMIDOL PER 1 ML: Performed by: INTERNAL MEDICINE

## 2023-01-15 PROCEDURE — 99214 OFFICE O/P EST MOD 30 MIN: CPT | Performed by: INTERNAL MEDICINE

## 2023-01-15 PROCEDURE — 80061 LIPID PANEL: CPT | Performed by: NURSE PRACTITIONER

## 2023-01-15 PROCEDURE — G0378 HOSPITAL OBSERVATION PER HR: HCPCS

## 2023-01-15 PROCEDURE — 82962 GLUCOSE BLOOD TEST: CPT

## 2023-01-15 PROCEDURE — C1760 CLOSURE DEV, VASC: HCPCS | Performed by: INTERNAL MEDICINE

## 2023-01-15 RX ORDER — LIDOCAINE HYDROCHLORIDE 20 MG/ML
INJECTION, SOLUTION INFILTRATION; PERINEURAL
Status: DISCONTINUED | OUTPATIENT
Start: 2023-01-15 | End: 2023-01-15 | Stop reason: HOSPADM

## 2023-01-15 RX ORDER — FENTANYL CITRATE 50 UG/ML
INJECTION, SOLUTION INTRAMUSCULAR; INTRAVENOUS
Status: DISCONTINUED | OUTPATIENT
Start: 2023-01-15 | End: 2023-01-15 | Stop reason: HOSPADM

## 2023-01-15 RX ORDER — MIDAZOLAM HYDROCHLORIDE 1 MG/ML
INJECTION INTRAMUSCULAR; INTRAVENOUS
Status: DISCONTINUED | OUTPATIENT
Start: 2023-01-15 | End: 2023-01-15 | Stop reason: HOSPADM

## 2023-01-15 RX ORDER — SODIUM CHLORIDE 9 MG/ML
INJECTION, SOLUTION INTRAVENOUS
Status: COMPLETED | OUTPATIENT
Start: 2023-01-15 | End: 2023-01-15

## 2023-01-15 RX ORDER — SODIUM CHLORIDE 9 MG/ML
250 INJECTION, SOLUTION INTRAVENOUS ONCE AS NEEDED
Status: DISCONTINUED | OUTPATIENT
Start: 2023-01-15 | End: 2023-01-15 | Stop reason: HOSPADM

## 2023-01-15 RX ORDER — ISOSORBIDE MONONITRATE 30 MG/1
30 TABLET, EXTENDED RELEASE ORAL
Qty: 30 TABLET | Refills: 0 | Status: SHIPPED | OUTPATIENT
Start: 2023-01-16 | End: 2023-01-27

## 2023-01-15 RX ORDER — LISINOPRIL 2.5 MG/1
2.5 TABLET ORAL
Qty: 30 TABLET | Refills: 0 | Status: SHIPPED | OUTPATIENT
Start: 2023-01-16 | End: 2023-02-10 | Stop reason: SDUPTHER

## 2023-01-15 RX ORDER — CLOPIDOGREL BISULFATE 75 MG/1
75 TABLET ORAL DAILY
Qty: 30 TABLET | Refills: 3 | Status: SHIPPED | OUTPATIENT
Start: 2023-01-16

## 2023-01-15 RX ORDER — METFORMIN HYDROCHLORIDE 500 MG/1
1000 TABLET, EXTENDED RELEASE ORAL 2 TIMES DAILY
Qty: 360 TABLET | Refills: 1
Start: 2023-01-18 | End: 2023-03-07

## 2023-01-15 RX ORDER — NITROGLYCERIN 0.4 MG/1
0.4 TABLET SUBLINGUAL
Qty: 30 TABLET | Refills: 12 | Status: SHIPPED | OUTPATIENT
Start: 2023-01-15

## 2023-01-15 RX ADMIN — ATORVASTATIN CALCIUM 40 MG: 40 TABLET, FILM COATED ORAL at 10:02

## 2023-01-15 RX ADMIN — Medication 10 ML: at 10:03

## 2023-01-15 RX ADMIN — ASPIRIN 81 MG: 81 TABLET, COATED ORAL at 10:01

## 2023-01-15 RX ADMIN — Medication 3 ML: at 10:02

## 2023-01-15 RX ADMIN — HYDROCHLOROTHIAZIDE 12.5 MG: 12.5 TABLET ORAL at 10:01

## 2023-01-15 RX ADMIN — EMPAGLIFLOZIN 25 MG: 25 TABLET, FILM COATED ORAL at 10:01

## 2023-01-15 RX ADMIN — LISINOPRIL 10 MG: 5 TABLET ORAL at 10:02

## 2023-01-15 RX ADMIN — ISOSORBIDE MONONITRATE 30 MG: 30 TABLET, EXTENDED RELEASE ORAL at 10:01

## 2023-01-15 RX ADMIN — CLOPIDOGREL BISULFATE 75 MG: 75 TABLET ORAL at 10:02

## 2023-01-15 NOTE — PROGRESS NOTES
LOS: 0 days   Admiting Physician- Sameer Schwartz MD    Reason For Followup:    Chest pain  Shortness of breath  Hyperlipidemia    Subjective     Patient still have mild chest pain    Objective     Hemodynamics are stable blood pressure is slightly low    Review of Systems:   Review of Systems   Constitutional: Negative for chills and fever.   HENT: Negative for ear discharge and nosebleeds.    Eyes: Negative for discharge and redness.   Cardiovascular: Negative for chest pain, orthopnea, palpitations, paroxysmal nocturnal dyspnea and syncope.   Respiratory: Negative for cough, shortness of breath and wheezing.    Endocrine: Negative for heat intolerance.   Skin: Negative for rash.   Musculoskeletal: Negative for arthritis and myalgias.   Gastrointestinal: Negative for abdominal pain, melena, nausea and vomiting.   Genitourinary: Negative for dysuria and hematuria.   Neurological: Negative for dizziness, light-headedness, numbness and tremors.   Psychiatric/Behavioral: Negative for depression. The patient is not nervous/anxious.          Vital Signs  Vitals:    01/15/23 1157 01/15/23 1203 01/15/23 1207 01/15/23 1228   BP: 98/65 106/65 106/63 102/55   BP Location:    Right arm   Patient Position:    Lying   Pulse: 76 84 78 80   Resp: 19 14 11 16   Temp:    97.3 °F (36.3 °C)   TempSrc:    Oral   SpO2: 98% 99% 98% 93%   Weight:       Height:         Wt Readings from Last 1 Encounters:   01/13/23 91.8 kg (202 lb 6.1 oz)       Intake/Output Summary (Last 24 hours) at 1/15/2023 1234  Last data filed at 1/14/2023 1739  Gross per 24 hour   Intake 462 ml   Output --   Net 462 ml     Physical Exam:  Constitutional:       Appearance: Well-developed.   Eyes:      General: No scleral icterus.        Right eye: No discharge.   HENT:      Head: Normocephalic and atraumatic.   Neck:      Thyroid: No thyromegaly.      Lymphadenopathy: No cervical adenopathy.   Pulmonary:      Effort: Pulmonary effort is normal. No  respiratory distress.      Breath sounds: Normal breath sounds. No wheezing. No rales.   Cardiovascular:      Normal rate. Regular rhythm.      No gallop.   Edema:     Peripheral edema absent.   Abdominal:      Tenderness: There is no abdominal tenderness.   Skin:     Findings: No erythema or rash.   Neurological:      Mental Status: Alert and oriented to person, place, and time.         Results Review:   Lab Results (last 24 hours)     Procedure Component Value Units Date/Time    POC Glucose Once [[id number]]  (Abnormal) Collected: 01/15/23 0704    Specimen: Blood Updated: 01/15/23 0705     Glucose 111 mg/dL      Comment: Serial Number: [id number]:  [id number]       Extra Tubes [[id number]] Collected: 01/15/23 0421    Specimen: Blood from Arm, Right Updated: 01/15/23 0530    Narrative:      The following orders were created for panel order Extra Tubes.  Procedure                               Abnormality         Status                     ---------                               -----------         ------                     Light Blue Top[[id number]]                                   Final result                 Please view results for these tests on the individual orders.    Light Blue Top [[id number]] Collected: 01/15/23 0421    Specimen: Blood from Arm, Right Updated: 01/15/23 0530     Extra Tube Hold for add-ons.     Comment: Auto resulted       Lipid Panel [[id number]]  (Abnormal) Collected: 01/15/23 0421    Specimen: Blood from Arm, Right Updated: 01/15/23 0528     Total Cholesterol 145 mg/dL      Triglycerides 204 mg/dL      HDL Cholesterol 41 mg/dL      LDL Cholesterol  70 mg/dL      VLDL Cholesterol 34 mg/dL      LDL/HDL Ratio 1.54    Narrative:      Cholesterol Reference Ranges  (U.S. Department of Health and Human Services ATP III Classifications)    Desirable          <200 mg/dL  Borderline High    200-239 mg/dL  High Risk          >240 mg/dL      Triglyceride Reference Ranges  (U.S. Department of  Health and Human Services ATP III Classifications)    Normal           <150 mg/dL  Borderline High  150-199 mg/dL  High             200-499 mg/dL  Very High        >500 mg/dL    HDL Reference Ranges  (U.S. Department of Health and Human Services ATP III Classifications)    Low     <40 mg/dl (major risk factor for CHD)  High    >60 mg/dl ('negative' risk factor for CHD)        LDL Reference Ranges  (U.S. Department of Health and Human Services ATP III Classifications)    Optimal          <100 mg/dL  Near Optimal     100-129 mg/dL  Borderline High  130-159 mg/dL  High             160-189 mg/dL  Very High        >189 mg/dL    Hemoglobin A1c [[id number]] Collected: 01/15/23 0421    Specimen: Blood from Arm, Right Updated: 01/15/23 0455    POC Glucose Once [[id number]]  (Abnormal) Collected: 01/14/23 1623    Specimen: Blood Updated: 01/14/23 1624     Glucose 135 mg/dL      Comment: Serial Number: [id number]:  [id number]           Imaging Results (Last 72 Hours)     Procedure Component Value Units Date/Time    XR Chest 1 View [[id number]] Collected: 01/13/23 2254     Updated: 01/13/23 2257    Narrative:      XR CHEST 1 VW    Date of Exam: 1/13/2023 10:37 PM EST    Indication: Chest Pain Protocol  Chest Pain Protocol.    Comparison: 6/9/2022    Findings:  Heart size and pulmonary vasculature are within normal limits. Lungs clear. Costophrenic angles sharp      Impression:      Impression:  No active cardiopulmonary disease    Electronically Signed: Augustus Yeh    1/13/2023 10:55 PM EST    Workstation ID: [id number]        ECG/EMG Results (most recent)     Procedure Component Value Units Date/Time    ECG 12 Lead Chest Pain [[id number]] Collected: 01/13/23 2148     Updated: 01/13/23 2151     QT Interval 392 ms     Narrative:      HEART RATE= 75  bpm  RR Interval= 796  ms  AK Interval= 185  ms  P Horizontal Axis= 19  deg  P Front Axis= 56  deg  QRSD Interval= 98  ms  QT Interval= 392  ms  QRS Axis= 8  deg  T Wave Axis= 43   deg  - NORMAL ECG -  Sinus rhythm  When compared with ECG of 09-Jun-2022 8:14:28,  No significant change  Electronically Signed By:   Date and Time of Study: 2023-01-13 21:48:16        CBC    Results from last 7 days   Lab Units 01/13/23  2222   WBC 10*3/mm3 5.60   HEMOGLOBIN g/dL 14.4   PLATELETS 10*3/mm3 163     BMP   Results from last 7 days   Lab Units 01/13/23  2222   SODIUM mmol/L 136   POTASSIUM mmol/L 4.0   CHLORIDE mmol/L 100   CO2 mmol/L 24.0   BUN mg/dL 17   CREATININE mg/dL 0.91   GLUCOSE mg/dL 120*     CMP   Results from last 7 days   Lab Units 01/13/23  2222   SODIUM mmol/L 136   POTASSIUM mmol/L 4.0   CHLORIDE mmol/L 100   CO2 mmol/L 24.0   BUN mg/dL 17   CREATININE mg/dL 0.91   GLUCOSE mg/dL 120*   ALBUMIN g/dL 4.5   BILIRUBIN mg/dL 0.3   ALK PHOS U/L 55   AST (SGOT) U/L 29   ALT (SGPT) U/L 33     Cardiac Studies:  Echo- Results for orders placed during the hospital encounter of 06/09/22    Adult Transthoracic Echo Complete W/ Cont if Necessary Per Protocol    Interpretation Summary  · Left ventricular systolic function is normal.  · Left ventricular ejection fraction is 55 to 60%.  · Left ventricular diastolic function is consistent with (grade I) impaired relaxation.  · Saline test results are negative.    Stress Myoview-  Cath-      Medication Review:   Scheduled Meds:[MAR Hold] aspirin, 81 mg, Oral, Daily  [MAR Hold] atorvastatin, 40 mg, Oral, Daily  [MAR Hold] clopidogrel, 75 mg, Oral, Daily  [MAR Hold] empagliflozin, 25 mg, Oral, Daily  [MAR Hold] enoxaparin, 40 mg, Subcutaneous, Daily  [MAR Hold] lisinopril, 10 mg, Oral, Q24H   And  [MAR Hold] hydroCHLOROthiazide, 12.5 mg, Oral, Q24H  [MAR Hold] insulin lispro, 2-9 Units, Subcutaneous, TID With Meals  [MAR Hold] isosorbide mononitrate, 30 mg, Oral, Q24H  [MAR Hold] sodium chloride, 10 mL, Intravenous, Q12H  [MAR Hold] sodium chloride, 3 mL, Intravenous, Q12H      Continuous Infusions:   PRN Meds:.•  [MAR Hold] dextrose  •  [MAR Hold]  dextrose  •  [MAR Hold] glucagon (human recombinant)  •  [MAR Hold] melatonin  •  [MAR Hold] nitroglycerin  •  [MAR Hold] ondansetron **OR** [MAR Hold] ondansetron  •  [MAR Hold] sodium chloride  •  [MAR Hold] sodium chloride  •  [MAR Hold] sodium chloride  •  [MAR Hold] sodium chloride  •  [MAR Hold] sodium chloride      Assessment & Plan   Patient Active Problem List   Diagnosis   • Diabetes mellitus, type 2 (HCC)   • Essential hypertension   • Hyperlipidemia   • Hypermetropia   • Encounter for screening for malignant neoplasm of colon   • Numbness and tingling of right upper and lower extremity   • History of stroke   • LANDRY on CPAP   • Class 1 obesity   • Chest pain     MDM:    1.  Chest pain:    Patient underwent cardiac catheterization and no significant coronary artery disease is noted.  Recommend observation and evaluate the patient for other causes of chest pain.        2.  Hyperlipidemia:    Continue Lipitor.    Patient can be discharged and follow-up with Dr. Cunningham as a outpatient.  Arsen Sweet MD  01/15/23  12:34 EST

## 2023-01-15 NOTE — DISCHARGE SUMMARY
[facility] Medicine Services  DISCHARGE SUMMARY    Patient Name: Glenroy Penaloza  : 1965  MRN: [id number]    Date of Admission: 2023  Discharge Diagnosis: chest pain  Date of Discharge:  1/15/23  Primary Care Physician: Cesar Pruitt MD    Presenting Problem:   Chest pain [R07.9]  Chest pain, unspecified type [R07.9]    Active and Resolved Hospital Problems:  Active Hospital Problems    Diagnosis POA   • **Chest pain [R07.9] Yes   • Class 1 obesity [E66.9] Yes   • LANDRY on CPAP [G47.33, Z99.89] Not Applicable   • History of stroke [Z86.73] Not Applicable   • Hyperlipidemia [E78.5] Yes   • Diabetes mellitus, type 2 (HCC) [E11.9] Yes   • Essential hypertension [I10] Yes      Resolved Hospital Problems   No resolved problems to display.     Chest pain  CXR: Showed no active cardiopulmonary process  -EKG showed sinus rhythm at 75 without obvious acute changes or ectopy and a QTC of 439 ms  -In the ED pt given 324 mg aspirin along with GI cocktail  -Stress Test from 2023 reported with normal ECG stress test, fixed anterior apical defect consistent with prior MI but without reversibility consistent with a low risk study  -Cardiology consulted who recommended initiation of Plavix in addition to his aspirin as well as Imdur with cardiac catheterization planned on either 1/15 or  as scheduling allows  -Telemetry     Diabetes mellitus-Well controlled   -Continue Jardiance  -Hold metformin  -Correctional insulin ordered  -Diabetic diet  -Monitor AC and HS     Hypertension-Well controlled   -Continue lisinopril-hydrochlorothiazide  - Monitor while admitted     Hyperlipidemia  -Statin  -check ck, lipid panel      Obesity (BMI: 30.77)  -Encourage diet lifestyle modification    Hospital Course     Hospital Course:  History of Present Illness: Glenroy Penaloza is a 57 y.o. male 57-year-old male comes in complaining of chest pain for the past 4 days.  Patient states his pain goes  from the center of his chest to his left shoulder and arm.  Patient had a stress test earlier in the week that showed a low risk study.  Patient otherwise denies any cardiac history.     1/14/2023 chest pain described as a pressure/tightness which radiates towards his left arm.  He denies any dyspnea, nausea or vomiting, cough or fever or peripheral edema and confirms that he had a recent low risk stress test but symptoms have remained present since that time.  Family history is notable for her brother who required CABG at approximately the same age the patient is currently.  We were asked to see patient for medical management.    1/15/23 doing better today, in bed NAD, doing better today. Will dc home, condition on dc stable.    DISCHARGE Follow Up Recommendations for labs and diagnostics: follow with pcp in one week'  Follow with cardiology 2 weeks    Reasons For Change In Medications and Indications for New Medications:     START taking:   clopidogrel (PLAVIX)    isosorbide mononitrate (IMDUR)    lisinopril (PRINIVIL,ZESTRIL)    nitroglycerin (NITROSTAT)   CHANGE how you take:   metFORMIN ER (GLUCOPHAGE-XR) -- These instructions start on January 18, 2023. If you are unsure what to do until then, ask your doctor or other care provider.  STOP taking:   lisinopril-hydrochlorothiazide 10-12.5 MG per tablet (PRINZIDE,ZESTORETIC)   Replaced by a similar medication.    Day of Discharge     Vital Signs:  Temp:  [97.3 °F (36.3 °C)-98.4 °F (36.9 °C)] 97.3 °F (36.3 °C)  Heart Rate:  [61-93] 83  Resp:  [10-19] 16  BP: ()/(50-68) 96/56  Flow (L/min):  [2] 2    Physical Exam   Appearance: Normal appearance. He is obese. He is not ill-appearing, toxic-appearing or diaphoretic.   HENT:      Head: Normocephalic.      Right Ear: External ear normal.      Left Ear: External ear normal.      Nose: Nose normal.      Mouth/Throat:      Mouth: Mucous membranes are moist.   Eyes:      Extraocular Movements: Extraocular movements  intact.   Cardiovascular:      Rate and Rhythm: Normal rate and regular rhythm.      Pulses: Normal pulses.      Heart sounds: Normal heart sounds.   Pulmonary:      Effort: Pulmonary effort is normal.      Breath sounds: Normal breath sounds.   Abdominal:      General: Bowel sounds are normal.      Palpations: Abdomen is soft.      Tenderness: There is no abdominal tenderness.   Musculoskeletal:         General: Normal range of motion.      Cervical back: Normal range of motion.      Right lower leg: No edema.      Left lower leg: No edema.   Skin:     General: Skin is warm and dry.      Capillary Refill: Capillary refill takes less than 2 seconds.   Neurological:      General: No focal deficit present.      Mental Status: He is alert and oriented to person, place, and time.   Psychiatric:         Mood and Affect: Mood normal.         Behavior: Behavior normal.         Thought Content: Thought content normal.         Judgment: Judgment normal.      Pertinent  and/or Most Recent Results     LAB RESULTS:      Lab 01/14/23  0545 01/13/23  2222   WBC  --  5.60   HEMOGLOBIN  --  14.4   HEMATOCRIT  --  40.9   PLATELETS  --  163   NEUTROS ABS  --  2.20   LYMPHS ABS  --  2.60   MONOS ABS  --  0.50   EOS ABS  --  0.30   MCV  --  91.0   PROTIME 10.0  --    APTT 26.7*  --          Lab 01/13/23  2222   SODIUM 136   POTASSIUM 4.0   CHLORIDE 100   CO2 24.0   ANION GAP 12.0   BUN 17   CREATININE 0.91   EGFR 97.7   GLUCOSE 120*   CALCIUM 9.2         Lab 01/13/23  2222   TOTAL PROTEIN 7.4   ALBUMIN 4.5   GLOBULIN 2.9   ALT (SGPT) 33   AST (SGOT) 29   BILIRUBIN 0.3   ALK PHOS 55         Lab 01/14/23  0545 01/14/23  0049 01/13/23  2222   TROPONIN T  --  <0.010 <0.010   PROTIME 10.0  --   --    INR 0.97  --   --          Lab 01/15/23  0421   CHOLESTEROL 145   LDL CHOL 70   HDL CHOL 41   TRIGLYCERIDES 204*             Brief Urine Lab Results  (Last result in the past 365 days)      Color   Clarity   Blood   Leuk Est   Nitrite   Protein    CREAT   Urine HCG        01/22/22 1147 Dark Yellow   Cloudy   Negative   Negative   Negative   30 mg/dL (1+)               Microbiology Results (last 10 days)     ** No results found for the last 240 hours. **          XR Chest 1 View    Result Date: 1/13/2023  Impression: Impression: No active cardiopulmonary disease Electronically Signed: Augustus Yeh  1/13/2023 10:55 PM EST  Workstation ID: [id number]              Results for orders placed during the hospital encounter of 06/09/22    Adult Transthoracic Echo Complete W/ Cont if Necessary Per Protocol    Interpretation Summary  · Left ventricular systolic function is normal.  · Left ventricular ejection fraction is 55 to 60%.  · Left ventricular diastolic function is consistent with (grade I) impaired relaxation.  · Saline test results are negative.      Labs Pending at Discharge:  Pending Labs     Order Current Status    Hemoglobin A1c In process          Procedures Performed  Procedure(s):  Left Heart Cath         Consults:   Consults     Date and Time Order Name Status Description    1/14/2023  2:11 PM Inpatient Hospitalist Consult      1/14/2023  4:45 AM Inpatient Cardiology Consult Completed           MDM:  1.  Chest pain:  Patient underwent cardiac catheterization and no significant coronary artery disease is noted.  Recommend observation and evaluate the patient for other causes of chest pain.     2.  Hyperlipidemia:  Continue Lipitor.     Patient can be discharged and follow-up with Dr. Cunningham as a outpatient.  Arsen Sweet MD  01/15/23  Discharge Details        Discharge Medications      New Medications      Instructions Start Date   clopidogrel 75 MG tablet  Commonly known as: PLAVIX   75 mg, Oral, Daily   Start Date: January 16, 2023     isosorbide mononitrate 30 MG 24 hr tablet  Commonly known as: IMDUR   30 mg, Oral, Every 24 Hours Scheduled   Start Date: January 16, 2023     lisinopril 2.5 MG tablet  Commonly known as: PRINIVIL,ZESTRIL  Replaces:  lisinopril-hydrochlorothiazide 10-12.5 MG per tablet   2.5 mg, Oral, Every 24 Hours Scheduled   Start Date: January 16, 2023     nitroglycerin 0.4 MG SL tablet  Commonly known as: NITROSTAT   0.4 mg, Sublingual, Every 5 Minutes PRN, Take no more than 3 doses in 15 minutes.         Changes to Medications      Instructions Start Date   metFORMIN  MG 24 hr tablet  Commonly known as: GLUCOPHAGE-XR  What changed: These instructions start on January 18, 2023. If you are unsure what to do until then, ask your doctor or other care provider.   1,000 mg, Oral, 2 Times Daily   Start Date: January 18, 2023        Continue These Medications      Instructions Start Date   aspirin 81 MG EC tablet  Commonly known as: Aspirin Low Dose   81 mg, Oral, Daily      atorvastatin 40 MG tablet  Commonly known as: LIPITOR   40 mg, Oral, Daily      glucose blood test strip   100 each, Other, Daily, Use as instructed Dx code: E11.65      Jardiance 25 MG tablet tablet  Generic drug: empagliflozin   TAKE 1 TABLET BY MOUTH EVERY DAY      Microlet Lancets misc   1 each, Does not apply, Daily, Dx code: E11.65         Stop These Medications    lisinopril-hydrochlorothiazide 10-12.5 MG per tablet  Commonly known as: PRINZIDE,ZESTORETIC  Replaced by: lisinopril 2.5 MG tablet            No Known Allergies      Discharge Disposition:  Home or Self Care    Diet:  Hospital:  Diet Order   Procedures   • Diet: Regular/House Diet; Texture: Regular Texture (IDDSI 7); Fluid Consistency: Thin (IDDSI 0)         Discharge Activity:   Activity Instructions     Gradually Increase Activity Until at Pre-Hospitalization Level              CODE STATUS:  Code Status and Medical Interventions:   Ordered at: 01/14/23 1411     Code Status (Patient has no pulse and is not breathing):    CPR (Attempt to Resuscitate)     Medical Interventions (Patient has pulse or is breathing):    Full Support     Release to patient:    Routine Release         Future Appointments    Date Time Provider Department Center   7/10/2023  2:30 PM Ruben Cunningham MD [facility]       Additional Instructions for the Follow-ups that You Need to Schedule     Discharge Follow-up with PCP   As directed       Currently Documented PCP:    Cesar Pruitt MD    PCP Phone Number:    [phone]     Follow Up Details: 1 week         Discharge Follow-up with Specified Provider: cardiology; 2 Weeks   As directed      To: cardiology    Follow Up: 2 Weeks               Time spent on Discharge including face to face service:  34 minutes    This patient has been examined wearing appropriate Personal Protective Equipment and discussed with rn. 01/15/23      Signature: Electronically signed by Sameer Schwartz MD, 01/15/23, 2:53 PM EST.

## 2023-01-15 NOTE — OUTREACH NOTE
Prep Survey    Flowsheet Row Responses   [facility] patient discharged from? [facility]   Is LACE score < 7 ? Yes   Eligibility [facility]   Date of Admission 01/15/23   Discharge Disposition Home or Self Care   Discharge diagnosis Chest pain    Does the patient have one of the following disease processes/diagnoses(primary or secondary)? Other   Does the patient have Home health ordered? No   Is there a DME ordered? No   Prep survey completed? Yes          SHELBY AGUILAR - Registered Nurse

## 2023-01-15 NOTE — PLAN OF CARE
Goal Outcome Evaluation:            Pt is stable after returning from cardiac cath. No bleeding or pain noticed. Bed rest now.  Will continue to monitor

## 2023-01-15 NOTE — CONSULTS
[facility] Medicine Services - Consult Note    Patient Name: Glenroy Penaloza  : 1965  MRN: [id number]  Primary Care Physician:  Cesar Pruitt MD  Referring Physician: Cesar Pruitt MD  Date of admission: 2023    Consults    Subjective      Reason for Consult/ Chief Complaint: chest pain    History of Present Illness: Glenroy Penaloza is a 57 y.o. male 57-year-old male comes in complaining of chest pain for the past 4 days.  Patient states his pain goes from the center of his chest to his left shoulder and arm.  Patient had a stress test earlier in the week that showed a low risk study.  Patient otherwise denies any cardiac history.     2023 chest pain described as a pressure/tightness which radiates towards his left arm.  He denies any dyspnea, nausea or vomiting, cough or fever or peripheral edema and confirms that he had a recent low risk stress test but symptoms have remained present since that time.  Family history is notable for her brother who required CABG at approximately the same age the patient is currently.  We were asked to see patient for medical management.        Review of Systems   Constitutional: Negative.   HENT: Negative.    Eyes: Negative.    Cardiovascular: Positive for chest pain.   Respiratory: Negative.    Skin: Negative.    Musculoskeletal: Negative.    Gastrointestinal: Negative.    Genitourinary: Negative.    Neurological: Negative.    Psychiatric/Behavioral: Negative.    ROS   Personal History     Past Medical History:   Diagnosis Date   • Atypical rash    • Diabetes mellitus, type 2 (HCC)    • Disorder of male genital organs    • Elevated glucose    • Essential hypertension 2020   • Glucosuria    • Hyperlipidemia    • Stroke (HCC) 22   • TIA (transient ischemic attack)        Past Surgical History:   Procedure Laterality Date   • COLONOSCOPY N/A 2022    Procedure: COLONOSCOPY WITH POLYPECTOMY;  Surgeon: Arsen Payan,  MD;  Location: [facility] ENDOSCOPY;  Service: Gastroenterology;  Laterality: N/A;  COLON POLYP, HEMORRHOIDS       Family History: family history includes Cancer in his father, maternal uncle, mother, and another family member; Heart disease (age of onset: 58) in his brother. Otherwise pertinent FHx was reviewed and not pertinent to current issue.    Social History:  reports that he has never smoked. He has never been exposed to tobacco smoke. He quit smokeless tobacco use about 17 months ago. He reports current alcohol use of about 10.0 standard drinks per week. He reports that he does not use drugs.    Home Medications:   Microlet Lancets, aspirin, atorvastatin, empagliflozin, glucose blood, lisinopril-hydrochlorothiazide, and metFORMIN ER    Allergies:  No Known Allergies    Objective      Vitals:  Temp:  [97.7 °F (36.5 °C)-98.3 °F (36.8 °C)] 98 °F (36.7 °C)  Heart Rate:  [69-82] 74  Resp:  [11-16] 16  BP: ()/(62-85) 98/68    Physical Exam General: He is not in acute distress.     Appearance: Normal appearance. He is obese. He is not ill-appearing, toxic-appearing or diaphoretic.   HENT:      Head: Normocephalic.      Right Ear: External ear normal.      Left Ear: External ear normal.      Nose: Nose normal.      Mouth/Throat:      Mouth: Mucous membranes are moist.   Eyes:      Extraocular Movements: Extraocular movements intact.   Cardiovascular:      Rate and Rhythm: Normal rate and regular rhythm.      Pulses: Normal pulses.      Heart sounds: Normal heart sounds.   Pulmonary:      Effort: Pulmonary effort is normal.      Breath sounds: Normal breath sounds.   Abdominal:      General: Bowel sounds are normal.      Palpations: Abdomen is soft.      Tenderness: There is no abdominal tenderness.   Musculoskeletal:         General: Normal range of motion.      Cervical back: Normal range of motion.      Right lower leg: No edema.      Left lower leg: No edema.   Skin:     General: Skin is warm and dry.       Capillary Refill: Capillary refill takes less than 2 seconds.   Neurological:      General: No focal deficit present.      Mental Status: He is alert and oriented to person, place, and time.   Psychiatric:         Mood and Affect: Mood normal.         Behavior: Behavior normal.         Thought Content: Thought content normal.         Judgment: Judgment normal.      Result Review    Result Review:  I have personally reviewed the results from the time of this admission to 1/14/2023 22:09 EST and agree with these findings:  []  Laboratory  []  Microbiology  []  Radiology  []  EKG/Telemetry   []  Cardiology/Vascular   []  Pathology  []  Old records  []  Other:  Most notable findings include:   CMP:      Lab 01/13/23  2222   SODIUM 136   POTASSIUM 4.0   CHLORIDE 100   CO2 24.0   ANION GAP 12.0   BUN 17   CREATININE 0.91   EGFR 97.7   GLUCOSE 120*   CALCIUM 9.2   TOTAL PROTEIN 7.4   ALBUMIN 4.5   GLOBULIN 2.9   ALT (SGPT) 33   AST (SGOT) 29   BILIRUBIN 0.3   ALK PHOS 55     CBC:      Lab 01/13/23  2222   WBC 5.60   HEMOGLOBIN 14.4   HEMATOCRIT 40.9   PLATELETS 163   NEUTROS ABS 2.20   LYMPHS ABS 2.60   MONOS ABS 0.50   EOS ABS 0.30   MCV 91.0       Assessment & Plan        Active Hospital Problems:  Active Hospital Problems    Diagnosis    • **Chest pain    • Class 1 obesity    • LANDRY on CPAP    • History of stroke    • Hyperlipidemia    • Diabetes mellitus, type 2 (HCC)    • Essential hypertension      Chest pain  CXR: Showed no active cardiopulmonary process  -EKG showed sinus rhythm at 75 without obvious acute changes or ectopy and a QTC of 439 ms  -In the ED pt given 324 mg aspirin along with GI cocktail  -Check lipid panel  -Stress Test from 1/11/2023 reported with normal ECG stress test, fixed anterior apical defect consistent with prior MI but without reversibility consistent with a low risk study  -Cardiology consulted who recommended initiation of Plavix in addition to his aspirin as well as Imdur with cardiac  catheterization planned on either 1/15 or 1/16 as scheduling allows  -Telemetry     Diabetes mellitus-Well controlled   -Continue Jardiance  -Hold metformin  -Correctional insulin ordered  -Diabetic diet  -Monitor AC and HS     Hypertension-Well controlled   -Continue lisinopril-hydrochlorothiazide  - Monitor while admitted     Hyperlipidemia  -Statin  -check ck, lipid panel     Obesity (BMI: 30.77)  -Encourage diet lifestyle modification     This patient has been examined wearing appropriate Personal Protective Equipment and discussed with rn. 01/14/23      Signature: Electronically signed by Sameer Schwartz MD, 01/15/23, 2:41 PM EST.

## 2023-01-15 NOTE — PLAN OF CARE
Problem: Adult Inpatient Plan of Care  Goal: Plan of Care Review  Outcome: Ongoing, Progressing  Goal: Patient-Specific Goal (Individualized)  Outcome: Ongoing, Progressing  Goal: Absence of Hospital-Acquired Illness or Injury  Outcome: Ongoing, Progressing  Intervention: Identify and Manage Fall Risk  Recent Flowsheet Documentation  Taken 1/15/2023 0000 by Sushma Yu RN  Safety Promotion/Fall Prevention: safety round/check completed  Taken 1/14/2023 2200 by Sushma Yu RN  Safety Promotion/Fall Prevention: safety round/check completed  Taken 1/14/2023 2000 by Sushma Yu RN  Safety Promotion/Fall Prevention: safety round/check completed  Taken 1/14/2023 1917 by Sushma Yu RN  Safety Promotion/Fall Prevention:   safety round/check completed   nonskid shoes/slippers when out of bed  Intervention: Prevent Skin Injury  Recent Flowsheet Documentation  Taken 1/14/2023 1917 by Sushma Yu RN  Body Position:   position changed independently   sitting up in bed  Goal: Optimal Comfort and Wellbeing  Outcome: Ongoing, Progressing  Goal: Readiness for Transition of Care  Outcome: Ongoing, Progressing   Goal Outcome Evaluation:

## 2023-01-16 ENCOUNTER — TRANSITIONAL CARE MANAGEMENT TELEPHONE ENCOUNTER (OUTPATIENT)
Dept: CALL CENTER | Facility: HOSPITAL | Age: 58
End: 2023-01-16
Payer: COMMERCIAL

## 2023-01-16 ENCOUNTER — TELEPHONE (OUTPATIENT)
Dept: CARDIOLOGY | Facility: CLINIC | Age: 58
End: 2023-01-16

## 2023-01-16 LAB
HBA1C MFR BLD: 7 % (ref 3.5–5.6)
QT INTERVAL: 392 MS

## 2023-01-16 NOTE — TELEPHONE ENCOUNTER
Caller: Glenroy Penaloza    Relationship: Self    Best call back number: [phone]    What is the best time to reach you: ANY     Who are you requesting to speak with (clinical staff, provider,  specific staff member): ANY     Do you know the name of the person who called: ANY     What was the call regarding: PT NEEDS A HOSPITAL FU WITH DR. OSHEA 2 WEEKS AFTER 1-15-23. 1ST AVAILABLE WAS IN MARCH. PLEASE CALL BACK AND OFFER PT AN APPT IF A SOONER ONE CAN BE MADE. THANK YOU     Do you require a callback: YES

## 2023-01-16 NOTE — OUTREACH NOTE
"Call Center TCM Note    Flowsheet Row Responses   [facility] patient discharged from? [facility]   Does the patient have one of the following disease processes/diagnoses(primary or secondary)? Other   TCM attempt successful? Yes   Call start time 1051   Call end time 1056   Discharge diagnosis Chest pain    Meds reviewed with patient/caregiver? Yes   Is the patient having any side effects they believe may be caused by any medication additions or changes? No   Does the patient have all medications ordered at discharge? Yes   Is the patient taking all medications as directed (includes completed medication regime)? Yes   Comments HOSP DC FU appt 1/19/23 @ 4:30 am.    Does the patient have an appointment with their PCP within 7 days of discharge? Yes   Has home health visited the patient within 72 hours of discharge? N/A   Psychosocial issues? No   Did the patient receive a copy of their discharge instructions? Yes   Nursing interventions Reviewed instructions with patient   What is the patient's perception of their health status since discharge? Improving   Is the patient/caregiver able to teach back signs and symptoms related to disease process for when to call PCP? Yes   Is the patient/caregiver able to teach back signs and symptoms related to disease process for when to call 911? Yes   Is the patient/caregiver able to teach back the hierarchy of who to call/visit for symptoms/problems? PCP, Specialist, Home health nurse, Urgent Care, ED, 911 Yes   TCM call completed? Yes   Wrap up additional comments Pt reports has some \"heaviness\" in chest while in hospital , but improved each time He took his meds. Pt reports that he is picking up meds today as he has some heaviness again. Pt is aware to call cardio if no improvement after meds, or if worsens.    Call end time 1056          Leny Richards RN    1/16/2023, 10:59 EST      "

## 2023-01-16 NOTE — CASE MANAGEMENT/SOCIAL WORK
Case Management Discharge Note      Final Note: home         Selected Continued Care - Discharged on 1/15/2023 Admission date: 1/14/2023 - Discharge disposition: Home or Self Care            Transportation Services  Private: Car    Final Discharge Disposition Code: 01 - home or self-care

## 2023-01-19 ENCOUNTER — OFFICE VISIT (OUTPATIENT)
Dept: FAMILY MEDICINE CLINIC | Facility: CLINIC | Age: 58
End: 2023-01-19
Payer: COMMERCIAL

## 2023-01-19 VITALS
SYSTOLIC BLOOD PRESSURE: 118 MMHG | HEIGHT: 68 IN | WEIGHT: 195 LBS | HEART RATE: 84 BPM | BODY MASS INDEX: 29.55 KG/M2 | DIASTOLIC BLOOD PRESSURE: 64 MMHG | OXYGEN SATURATION: 96 % | TEMPERATURE: 97.9 F

## 2023-01-19 DIAGNOSIS — E11.65 TYPE 2 DIABETES MELLITUS WITH HYPERGLYCEMIA, WITHOUT LONG-TERM CURRENT USE OF INSULIN: ICD-10-CM

## 2023-01-19 DIAGNOSIS — I10 PRIMARY HYPERTENSION: ICD-10-CM

## 2023-01-19 DIAGNOSIS — Z09 HOSPITAL DISCHARGE FOLLOW-UP: Primary | ICD-10-CM

## 2023-01-19 PROCEDURE — 83735 ASSAY OF MAGNESIUM: CPT | Performed by: FAMILY MEDICINE

## 2023-01-19 PROCEDURE — 36415 COLL VENOUS BLD VENIPUNCTURE: CPT | Performed by: FAMILY MEDICINE

## 2023-01-19 PROCEDURE — 99495 TRANSJ CARE MGMT MOD F2F 14D: CPT | Performed by: FAMILY MEDICINE

## 2023-01-19 PROCEDURE — 80053 COMPREHEN METABOLIC PANEL: CPT | Performed by: FAMILY MEDICINE

## 2023-01-19 PROCEDURE — 85025 COMPLETE CBC W/AUTO DIFF WBC: CPT | Performed by: FAMILY MEDICINE

## 2023-01-19 NOTE — PROGRESS NOTES
Transitional Care Follow Up Visit  Subjective     Glenroy Penaloza is a 57 y.o. male who presents for a transitional care management visit.    Within 48 business hours after discharge our office contacted him via telephone to coordinate his care and needs.      I reviewed and discussed the details of that call along with the discharge summary, hospital problems, inpatient lab results, inpatient diagnostic studies, and consultation reports with Glenroy.     Current outpatient and discharge medications have been reconciled for the patient.  Reviewed by: Cesar Pruitt MD      Date of TCM Phone Call 1/15/2023   [facility]   Date of Admission 1/15/2023   Date of Discharge -   Discharge Disposition Home or Self Care     Risk for Readmission (LACE) Score: 2 (1/15/2023  6:00 AM)      History of Present Illness  Pt doing better.  Pt has less chest pains and will see cardiology on 1/27/2023.     Course During Hospital Stay:  Pt admitted for chest pain- had recent stress test, which was normal.  Was monitored and pt did better.  Pt had cardiac cath with small blockages so was allowed to go home with outpatient meds.  Was then discharged to follow-up with cardiology in 2 weeks.  Was placed on plavix and isosorbide and continued lisinopril and DC'd HCTZ.     The following portions of the patient's history were reviewed and updated as appropriate:   He  has a past medical history of Atypical rash, Diabetes mellitus, type 2 (HCC), Disorder of male genital organs, Elevated glucose, Essential hypertension (07/30/2020), Glucosuria, Hyperlipidemia, Stroke (HCC) (6/9/22), and TIA (transient ischemic attack).  He does not have any pertinent problems on file.  He  has a past surgical history that includes Colonoscopy (N/A, 04/04/2022) and Cardiac catheterization (N/A, 1/15/2023).  His family history includes Cancer in his father, maternal uncle, mother, and another family member; Heart disease (age of onset: 58) in his  brother.  He  reports that he has never smoked. He has never been exposed to tobacco smoke. He quit smokeless tobacco use about 17 months ago. He reports current alcohol use of about 10.0 standard drinks per week. He reports that he does not use drugs.  Current Outpatient Medications   Medication Sig Dispense Refill   • aspirin (Aspirin Low Dose) 81 MG EC tablet Take 1 tablet by mouth Daily. 90 tablet 1   • atorvastatin (LIPITOR) 40 MG tablet Take 1 tablet by mouth Daily. 90 tablet 1   • clopidogrel (PLAVIX) 75 MG tablet Take 1 tablet by mouth Daily. 30 tablet 3   • glucose blood test strip 100 each by Other route Daily. Use as instructed Dx code: E11.65 100 each 2   • isosorbide mononitrate (IMDUR) 30 MG 24 hr tablet Take 1 tablet by mouth Daily. 30 tablet 0   • Jardiance 25 MG tablet tablet TAKE 1 TABLET BY MOUTH EVERY DAY 90 tablet 1   • lisinopril (PRINIVIL,ZESTRIL) 2.5 MG tablet Take 1 tablet by mouth Daily. 30 tablet 0   • metFORMIN ER (GLUCOPHAGE-XR) 500 MG 24 hr tablet Take 2 tablets by mouth 2 (Two) Times a Day. 360 tablet 1   • Microlet Lancets misc 1 each Daily. Dx code: E11.65 100 each 2   • nitroglycerin (NITROSTAT) 0.4 MG SL tablet Place 1 tablet under the tongue Every 5 (Five) Minutes As Needed for Chest Pain (Only if SBP Greater Than 100). Take no more than 3 doses in 15 minutes. 30 tablet 12     No current facility-administered medications for this visit.     Current Outpatient Medications on File Prior to Visit   Medication Sig   • aspirin (Aspirin Low Dose) 81 MG EC tablet Take 1 tablet by mouth Daily.   • atorvastatin (LIPITOR) 40 MG tablet Take 1 tablet by mouth Daily.   • clopidogrel (PLAVIX) 75 MG tablet Take 1 tablet by mouth Daily.   • glucose blood test strip 100 each by Other route Daily. Use as instructed Dx code: E11.65   • isosorbide mononitrate (IMDUR) 30 MG 24 hr tablet Take 1 tablet by mouth Daily.   • Jardiance 25 MG tablet tablet TAKE 1 TABLET BY MOUTH EVERY DAY   • lisinopril  (PRINIVIL,ZESTRIL) 2.5 MG tablet Take 1 tablet by mouth Daily.   • metFORMIN ER (GLUCOPHAGE-XR) 500 MG 24 hr tablet Take 2 tablets by mouth 2 (Two) Times a Day.   • Microlet Lancets misc 1 each Daily. Dx code: E11.65   • nitroglycerin (NITROSTAT) 0.4 MG SL tablet Place 1 tablet under the tongue Every 5 (Five) Minutes As Needed for Chest Pain (Only if SBP Greater Than 100). Take no more than 3 doses in 15 minutes.     No current facility-administered medications on file prior to visit.     He has No Known Allergies..    Review of Systems   Constitutional: Negative for fatigue and fever.   HENT: Negative for sore throat.    Eyes: Negative for visual disturbance.   Respiratory: Negative for cough, chest tightness, shortness of breath and wheezing.    Cardiovascular: Negative for chest pain, palpitations and leg swelling.   Gastrointestinal: Negative for abdominal pain, diarrhea, nausea and vomiting.   Skin: Negative for rash.   Neurological: Negative for light-headedness and headaches.   Psychiatric/Behavioral: Negative for decreased concentration.       Objective   Physical Exam  Vitals reviewed. Exam conducted with a chaperone present.   Constitutional:       Appearance: Normal appearance. He is well-developed.   HENT:      Head: Normocephalic and atraumatic.      Right Ear: External ear normal.      Left Ear: External ear normal.      Mouth/Throat:      Pharynx: No oropharyngeal exudate.   Eyes:      Conjunctiva/sclera: Conjunctivae normal.      Pupils: Pupils are equal, round, and reactive to light.   Cardiovascular:      Rate and Rhythm: Normal rate and regular rhythm.      Pulses: Normal pulses.      Heart sounds: Normal heart sounds. No murmur heard.    No friction rub. No gallop.   Pulmonary:      Effort: Pulmonary effort is normal.      Breath sounds: Normal breath sounds. No wheezing or rhonchi.   Abdominal:      General: Abdomen is flat. Bowel sounds are normal. There is no distension.      Palpations:  Abdomen is soft. There is no mass.      Tenderness: There is no abdominal tenderness. There is no guarding or rebound.      Hernia: No hernia is present.   Musculoskeletal:         General: Normal range of motion.   Skin:     General: Skin is warm and dry.      Capillary Refill: Capillary refill takes less than 2 seconds.      Comments: Checked cath site wound- closed, no blood, no redness, warmth, swelling, or bruising, non-tender.  Changed bandage.   Neurological:      General: No focal deficit present.      Mental Status: He is alert and oriented to person, place, and time.      Cranial Nerves: No cranial nerve deficit.   Psychiatric:         Mood and Affect: Mood and affect normal.         Behavior: Behavior normal.         Thought Content: Thought content normal.         Judgment: Judgment normal.         Assessment & Plan   Problems Addressed this Visit        Endocrine and Metabolic    Diabetes mellitus, type 2 (HCC) (Chronic)    Relevant Orders    CBC Auto Differential    Comprehensive Metabolic Panel    Magnesium   Other Visit Diagnoses     Hospital discharge follow-up    -  Primary    Primary hypertension        Relevant Orders    CBC Auto Differential    Comprehensive Metabolic Panel    Magnesium      Diagnoses       Codes Comments    Hospital discharge follow-up    -  Primary ICD-10-CM: Z09  ICD-9-CM: V67.59     Type 2 diabetes mellitus with hyperglycemia, without long-term current use of insulin (HCC)     ICD-10-CM: E11.65  ICD-9-CM: 250.00, 790.29     Primary hypertension     ICD-10-CM: I10  ICD-9-CM: 401.9           Changed tegrederm bandage and gauze- hemostasis continued- wound looks very close to healed.  Will keep pt off work until cleared by Cardiology on 1/27/2023.  Will place back to work on the Monday right after the cardiology follow-up appt.

## 2023-01-19 NOTE — PROGRESS NOTES
..  Venipuncture Blood Specimen Collection  Venipuncture performed in left arm by Mary Knight with good hemostasis. Patient tolerated the procedure well without complications.   01/19/23   Mary Knight

## 2023-01-20 LAB
ALBUMIN SERPL-MCNC: 4.6 G/DL (ref 3.5–5.2)
ALBUMIN/GLOB SERPL: 1.7 G/DL
ALP SERPL-CCNC: 45 U/L (ref 39–117)
ALT SERPL W P-5'-P-CCNC: 30 U/L (ref 1–41)
ANION GAP SERPL CALCULATED.3IONS-SCNC: 12 MMOL/L (ref 5–15)
AST SERPL-CCNC: 22 U/L (ref 1–40)
BASOPHILS # BLD AUTO: 0.03 10*3/MM3 (ref 0–0.2)
BASOPHILS NFR BLD AUTO: 0.5 % (ref 0–1.5)
BILIRUB SERPL-MCNC: 0.3 MG/DL (ref 0–1.2)
BUN SERPL-MCNC: 17 MG/DL (ref 6–20)
BUN/CREAT SERPL: 16.3 (ref 7–25)
CALCIUM SPEC-SCNC: 9.6 MG/DL (ref 8.6–10.5)
CHLORIDE SERPL-SCNC: 104 MMOL/L (ref 98–107)
CO2 SERPL-SCNC: 25 MMOL/L (ref 22–29)
CREAT SERPL-MCNC: 1.04 MG/DL (ref 0.76–1.27)
DEPRECATED RDW RBC AUTO: 40.7 FL (ref 37–54)
EGFRCR SERPLBLD CKD-EPI 2021: 83.2 ML/MIN/1.73
EOSINOPHIL # BLD AUTO: 0.25 10*3/MM3 (ref 0–0.4)
EOSINOPHIL NFR BLD AUTO: 4.5 % (ref 0.3–6.2)
ERYTHROCYTE [DISTWIDTH] IN BLOOD BY AUTOMATED COUNT: 12.1 % (ref 12.3–15.4)
GLOBULIN UR ELPH-MCNC: 2.7 GM/DL
GLUCOSE SERPL-MCNC: 115 MG/DL (ref 65–99)
HCT VFR BLD AUTO: 41.2 % (ref 37.5–51)
HGB BLD-MCNC: 14.6 G/DL (ref 13–17.7)
IMM GRANULOCYTES # BLD AUTO: 0.01 10*3/MM3 (ref 0–0.05)
IMM GRANULOCYTES NFR BLD AUTO: 0.2 % (ref 0–0.5)
LYMPHOCYTES # BLD AUTO: 2.2 10*3/MM3 (ref 0.7–3.1)
LYMPHOCYTES NFR BLD AUTO: 39.6 % (ref 19.6–45.3)
MAGNESIUM SERPL-MCNC: 2.3 MG/DL (ref 1.6–2.6)
MCH RBC QN AUTO: 32.4 PG (ref 26.6–33)
MCHC RBC AUTO-ENTMCNC: 35.4 G/DL (ref 31.5–35.7)
MCV RBC AUTO: 91.6 FL (ref 79–97)
MONOCYTES # BLD AUTO: 0.56 10*3/MM3 (ref 0.1–0.9)
MONOCYTES NFR BLD AUTO: 10.1 % (ref 5–12)
NEUTROPHILS NFR BLD AUTO: 2.51 10*3/MM3 (ref 1.7–7)
NEUTROPHILS NFR BLD AUTO: 45.1 % (ref 42.7–76)
NRBC BLD AUTO-RTO: 0 /100 WBC (ref 0–0.2)
PLATELET # BLD AUTO: 182 10*3/MM3 (ref 140–450)
PMV BLD AUTO: 10.9 FL (ref 6–12)
POTASSIUM SERPL-SCNC: 4.1 MMOL/L (ref 3.5–5.2)
PROT SERPL-MCNC: 7.3 G/DL (ref 6–8.5)
RBC # BLD AUTO: 4.5 10*6/MM3 (ref 4.14–5.8)
SODIUM SERPL-SCNC: 141 MMOL/L (ref 136–145)
WBC NRBC COR # BLD: 5.56 10*3/MM3 (ref 3.4–10.8)

## 2023-01-27 ENCOUNTER — OFFICE VISIT (OUTPATIENT)
Dept: CARDIOLOGY | Facility: CLINIC | Age: 58
End: 2023-01-27
Payer: COMMERCIAL

## 2023-01-27 VITALS
HEIGHT: 68 IN | HEART RATE: 80 BPM | BODY MASS INDEX: 30.16 KG/M2 | SYSTOLIC BLOOD PRESSURE: 114 MMHG | DIASTOLIC BLOOD PRESSURE: 70 MMHG | RESPIRATION RATE: 18 BRPM | WEIGHT: 199 LBS

## 2023-01-27 DIAGNOSIS — I10 ESSENTIAL HYPERTENSION: Chronic | ICD-10-CM

## 2023-01-27 DIAGNOSIS — Z09 HOSPITAL DISCHARGE FOLLOW-UP: Primary | ICD-10-CM

## 2023-01-27 DIAGNOSIS — R07.9 CHEST PAIN, UNSPECIFIED TYPE: ICD-10-CM

## 2023-01-27 DIAGNOSIS — E78.2 MIXED HYPERLIPIDEMIA: Chronic | ICD-10-CM

## 2023-01-27 PROCEDURE — 99214 OFFICE O/P EST MOD 30 MIN: CPT | Performed by: NURSE PRACTITIONER

## 2023-01-27 RX ORDER — METOPROLOL SUCCINATE 25 MG/1
12.5 TABLET, EXTENDED RELEASE ORAL DAILY
Qty: 30 TABLET | Refills: 3 | Status: SHIPPED | OUTPATIENT
Start: 2023-01-27 | End: 2023-04-27

## 2023-02-06 NOTE — PROGRESS NOTES
Cardiology Clinic Note  Ruben Cunningham MD, PhD    Subjective:     Encounter Date:01/04/2023      Patient ID: Glenroy Penaloza is a 57 y.o. male.    Chief Complaint:  Chief Complaint   Patient presents with   • Follow-up       HPI:       At the pleasure to see this very pleasant 57-year-old gentleman as a new patient today with history of acute ischemic vertebrobasilar thalamic stroke.  His weight is 202 pounds blood pressure is 118/76 and heart rates in the 90s.  He has diabetes hypertension hyperlipidemia.  His brother has a stent bypass surgery, EKG reveals sinus rhythm with normal conduction no ischemic segments, 2D echo during his hospitalization revealed an EF of 55 to 60% with normal myocardial thickness, grade 1 diastolic dysfunction, normal atrial sizes normal RV size and function no significant valvular abnormality, bubble study was negative.  We did discuss the vertebrobasilar strokes and thalamic strokes are usually secondary to poor hypertension, known cardioembolic etiology.  He voiced understanding.  We did discuss risk factor control with blood pressure less than 130 systolic, A1c less than 7, LDL less than 70, antiplatelet medicines for anticoagulation on board of which she has aspirin, high intensity statin, afterload reduction lisinopril HCTZ, diabetes medicines with metformin.  Normal cardiac structure and function by 2D echo,   He does present back to complaining of some chest discomfort despite controlled blood pressures.  Risk factors of diabetes hypertension age, we discussed noninvasive ischemic evaluation with stress testing which se is amenable for    Review of systems otherwise negative x14 point review of systems except as mentioned above     Historical data copied forward from previous encounters in EMR including the history, exam, and assessment/plan has been reviewed and is unchanged unless noted otherwise.     Cardiac medicines reviewed with risk, benefits, and necessity of each  "discussed.     Risk and benefit of cardiac testing reviewed including death heart attack stroke pain bleeding infection need for vascular /cardiovascular surgery were discussed and the patient      Objective:         Objective          116/73 heart rate 70 weight 198     Physical Exam  Regular rate and rhythm no rubs murmurs gallops  No heave no lift  No clubbing cyanosis or edema  Normal pulses  Oral cap refill  Intact grossly  Clear to auscultation  Soft nontender nondistended  No carotid bruits or JVD  Assessment:         Assessment          Stroke  Essential hypertension  Hyperlipidemia  Atypical chest pain    Thalamic location of stroke  Risk factor control, goal blood pressure less than 130 systolic, goal A1c less than 7, goal LDL less than 70  Continue present medications  Normal 2D echo previously  Negative bubble study with evidence of ASD or PFO or paradoxic embolic event  No history of thromboembolic event  No history of any CAD or unstable angina  Structurally normal heart otherwise  Atypical chest pain, schedule stress test with nuclear imaging for risk stratification, cardiac risk factors above     Risk factor control  Refer back to primary care  Back to cardiology in 6 months to 1 year     The pleasure to be involved in this patient's cardiovascular care.  Please call with any questions or concerns  Ruben Cunningham MD, PhD      Historical data copied forward from previous encounters in EMR including the history, exam, and assessment/plan has been reviewed and is unchanged unless noted otherwise.    Cardiac medicines reviewed with risk, benefits, and necessity of each discussed.    Risk and benefit of cardiac testing reviewed including death heart attack stroke pain bleeding infection need for vascular /cardiovascular surgery were discussed and the patient     Objective:         /73 (BP Location: Right arm, Patient Position: Sitting)   Pulse 70   Resp 18   Ht 172.7 cm (68\")   Wt 89.8 kg (198 " lb)   BMI 30.11 kg/m²     Physical Exam    Assessment:         Diagnoses and all orders for this visit:    1. Chest pain, atypical (Primary)  -     Stress Test With Myocardial Perfusion One Day; Future           Plan:              The pleasure to be involved in this patient's cardiovascular care.  Please call with any questions or concerns  Ruben Cunningham MD, PhD    Most recent EKG as reviewed and interpreted by me:  Procedures     Most recent echo as reviewed and interpreted by me:  Results for orders placed during the hospital encounter of 06/09/22    Adult Transthoracic Echo Complete W/ Cont if Necessary Per Protocol    Interpretation Summary  · Left ventricular systolic function is normal.  · Left ventricular ejection fraction is 55 to 60%.  · Left ventricular diastolic function is consistent with (grade I) impaired relaxation.  · Saline test results are negative.      Most recent stress test as reviewed and interpreted by me:      Most recent cardiac catheterization as reviewed interpreted by me:  No results found for this or any previous visit.    The following portions of the patient's history were reviewed and updated as appropriate: allergies, current medications, past family history, past medical history, past social history, past surgical history and problem list.      ROS:  14 point review of systems negative except as mentioned above    Current Outpatient Medications:   •  aspirin (Aspirin Low Dose) 81 MG EC tablet, Take 1 tablet by mouth Daily., Disp: 90 tablet, Rfl: 1  •  atorvastatin (LIPITOR) 40 MG tablet, Take 1 tablet by mouth Daily., Disp: 90 tablet, Rfl: 1  •  Jardiance 25 MG tablet tablet, TAKE 1 TABLET BY MOUTH EVERY DAY, Disp: 90 tablet, Rfl: 1  •  clopidogrel (PLAVIX) 75 MG tablet, Take 1 tablet by mouth Daily., Disp: 30 tablet, Rfl: 3  •  glucose blood test strip, 100 each by Other route Daily. Use as instructed Dx code: E11.65, Disp: 100 each, Rfl: 2  •  lisinopril (PRINIVIL,ZESTRIL) 2.5  MG tablet, Take 1 tablet by mouth Daily., Disp: 30 tablet, Rfl: 0  •  metFORMIN ER (GLUCOPHAGE-XR) 500 MG 24 hr tablet, Take 2 tablets by mouth 2 (Two) Times a Day., Disp: 360 tablet, Rfl: 1  •  metoprolol succinate XL (TOPROL-XL) 25 MG 24 hr tablet, Take 0.5 tablets by mouth Daily for 90 days., Disp: 30 tablet, Rfl: 3  •  Microlet Lancets misc, 1 each Daily. Dx code: E11.65, Disp: 100 each, Rfl: 2  •  nitroglycerin (NITROSTAT) 0.4 MG SL tablet, Place 1 tablet under the tongue Every 5 (Five) Minutes As Needed for Chest Pain (Only if SBP Greater Than 100). Take no more than 3 doses in 15 minutes., Disp: 30 tablet, Rfl: 12    Problem List:  Patient Active Problem List   Diagnosis   • Diabetes mellitus, type 2 (HCC)   • Essential hypertension   • Hyperlipidemia   • Hypermetropia   • Encounter for screening for malignant neoplasm of colon   • Numbness and tingling of right upper and lower extremity   • History of stroke   • LANDRY on CPAP   • Class 1 obesity   • Chest pain     Past Medical History:  Past Medical History:   Diagnosis Date   • Atypical rash    • Diabetes mellitus, type 2 (HCC)    • Disorder of male genital organs    • Elevated glucose    • Essential hypertension 07/30/2020   • Glucosuria    • Hyperlipidemia    • Stroke (HCC) 6/9/22   • TIA (transient ischemic attack)      Past Surgical History:  Past Surgical History:   Procedure Laterality Date   • CARDIAC CATHETERIZATION N/A 1/15/2023    Procedure: Left Heart Cath;  Surgeon: Arsen Sweet MD;  Location: [facility] INVASIVE LOCATION;  Service: Cardiovascular;  Laterality: N/A;   • COLONOSCOPY N/A 04/04/2022    Procedure: COLONOSCOPY WITH POLYPECTOMY;  Surgeon: Arsen Payan MD;  Location: [facility] ENDOSCOPY;  Service: Gastroenterology;  Laterality: N/A;  COLON POLYP, HEMORRHOIDS     Social History:  Social History     Socioeconomic History   • Marital status:    Tobacco Use   • Smoking status: Never     Passive exposure: Never   • Smokeless  tobacco: Former     Quit date: 8/1/2021   • Tobacco comments:     Smokeless tobacco 40 years until 8/1/21   Vaping Use   • Vaping Use: Never used   Substance and Sexual Activity   • Alcohol use: Yes     Alcohol/week: 10.0 standard drinks     Types: 10 Cans of beer per week     Comment: current some day    • Drug use: Never   • Sexual activity: Yes     Partners: Female     Birth control/protection: None     Allergies:  No Known Allergies  Immunizations:  Immunization History   Administered Date(s) Administered   • COVID-19 (Genterpret) 07/18/2021            In-Office Procedure(s):  No orders to display        ASCVD RIsk Score::  The ASCVD Risk score (Yong RUSSELL, et al., 2019) failed to calculate for the following reasons:    The patient has a prior MI or stroke diagnosis    Imaging:    Results for orders placed in visit on 12/15/22    XR Wrist 3+ View Left (In Office)    Narrative  PROCEDURE: XR WRIST 3+ VW LEFT    COMPARISON: None    INDICATIONS: left wrist pain at base of thumb    FINDINGS:  Moderate degenerative changes are noted at the 1st carpometacarpal joint.  No acute osseous  abnormality is noted.  Soft tissues are grossly unremarkable in appearance    Impression  1. Moderate degenerative changes at the 1st carpometacarpal joint          JAYJAY VELASQUEZ MD  Electronically Signed and Approved By: JAYJAY VELASQUEZ MD on 12/15/2022 at 16:51       Results for orders placed during the hospital encounter of 06/09/22    CT Head Without Contrast    Narrative  EXAMINATION: CT HEAD WO CONTRAST    DATE: 6/10/2022 6:16 AM    INDICATION: Headache. Status post TPA.    COMPARISON: CT head June 9, 2022    TECHNIQUE: Thin section noncontrast axial images were obtained through the head. Coronal reformatted images were created.  CT dose lowering techniques were used, to include: automated exposure control, adjustment for patient size, and or use of iterative  reconstruction    FINDINGS:    INTRACRANIAL:  No acute intracranial  hemorrhage.  No acute territorial infarct.    Evolving small infarct in the left thalamus.  No significant mass effect. No hydrocephalus.      EXTRACRANIAL:  Globes and orbits are unremarkable.  Paranasal sinuses are essentially clear.  Mastoid air cells are clear.  Calvarium is intact.    Impression  1.  Evolving small recent infarct within the left thalamus.  2.  No acute intracranial hemorrhage.                Electronically signed by:  Gordo Hallman DO  6/10/2022 5:21 AM      Results for orders placed during the hospital encounter of 06/09/22    CT Head Without Contrast    Narrative  EXAMINATION: CT HEAD WO CONTRAST    DATE: 6/10/2022 6:16 AM    INDICATION: Headache. Status post TPA.    COMPARISON: CT head June 9, 2022    TECHNIQUE: Thin section noncontrast axial images were obtained through the head. Coronal reformatted images were created.  CT dose lowering techniques were used, to include: automated exposure control, adjustment for patient size, and or use of iterative  reconstruction    FINDINGS:    INTRACRANIAL:  No acute intracranial hemorrhage.  No acute territorial infarct.    Evolving small infarct in the left thalamus.  No significant mass effect. No hydrocephalus.      EXTRACRANIAL:  Globes and orbits are unremarkable.  Paranasal sinuses are essentially clear.  Mastoid air cells are clear.  Calvarium is intact.    Impression  1.  Evolving small recent infarct within the left thalamus.  2.  No acute intracranial hemorrhage.                Electronically signed by:  Gordo Hallman DO  6/10/2022 5:21 AM      Lab Review:   Office Visit on 12/15/2022   Component Date Value   • Glucose 12/15/2022 132 (H)    • BUN 12/15/2022 18    • Creatinine 12/15/2022 0.98    • Sodium 12/15/2022 140    • Potassium 12/15/2022 4.0    • Chloride 12/15/2022 105    • CO2 12/15/2022 27.4    • Calcium 12/15/2022 9.5    • Total Protein 12/15/2022 6.7    • Albumin 12/15/2022 4.30    • ALT (SGPT) 12/15/2022 26    • AST (SGOT)  12/15/2022 24    • Alkaline Phosphatase 12/15/2022 46    • Total Bilirubin 12/15/2022 0.3    • Globulin 12/15/2022 2.4    • A/G Ratio 12/15/2022 1.8    • BUN/Creatinine Ratio 12/15/2022 18.4    • Anion Gap 12/15/2022 7.6    • eGFR 12/15/2022 89.4    • Hemoglobin A1C 12/15/2022 7.40 (H)    Clinical Support on 09/17/2022   Component Date Value   • WBC 09/17/2022 5.36    • RBC 09/17/2022 4.48    • Hemoglobin 09/17/2022 14.4    • Hematocrit 09/17/2022 42.3    • MCV 09/17/2022 94.4    • MCH 09/17/2022 32.1    • MCHC 09/17/2022 34.0    • RDW 09/17/2022 12.3    • RDW-SD 09/17/2022 42.4    • MPV 09/17/2022 10.3    • Platelets 09/17/2022 152    • Neutrophil % 09/17/2022 49.6    • Lymphocyte % 09/17/2022 36.6    • Monocyte % 09/17/2022 9.5    • Eosinophil % 09/17/2022 3.5    • Basophil % 09/17/2022 0.6    • Immature Grans % 09/17/2022 0.2    • Neutrophils, Absolute 09/17/2022 2.66    • Lymphocytes, Absolute 09/17/2022 1.96    • Monocytes, Absolute 09/17/2022 0.51    • Eosinophils, Absolute 09/17/2022 0.19    • Basophils, Absolute 09/17/2022 0.03    • Immature Grans, Absolute 09/17/2022 0.01    • nRBC 09/17/2022 0.0    • Glucose 09/17/2022 151 (H)    • BUN 09/17/2022 24 (H)    • Creatinine 09/17/2022 0.97    • Sodium 09/17/2022 140    • Potassium 09/17/2022 4.1    • Chloride 09/17/2022 102    • CO2 09/17/2022 24.9    • Calcium 09/17/2022 9.4    • Total Protein 09/17/2022 6.4    • Albumin 09/17/2022 4.30    • ALT (SGPT) 09/17/2022 28    • AST (SGOT) 09/17/2022 24    • Alkaline Phosphatase 09/17/2022 41    • Total Bilirubin 09/17/2022 0.4    • Globulin 09/17/2022 2.1    • A/G Ratio 09/17/2022 2.0    • BUN/Creatinine Ratio 09/17/2022 24.7    • Anion Gap 09/17/2022 13.1    • eGFR 09/17/2022 90.5    • Hemoglobin A1C 09/17/2022 7.30 (H)    • Total Cholesterol 09/17/2022 151    • Triglycerides 09/17/2022 154 (H)    • HDL Cholesterol 09/17/2022 51    • LDL Cholesterol  09/17/2022 74    • VLDL Cholesterol 09/17/2022 26    • LDL/HDL  Ratio 09/17/2022 1.36    • TSH 09/17/2022 1.450    • Free T4 09/17/2022 0.92 (L)    • Microalbumin, Urine 09/17/2022 1.8    • PSA 09/17/2022 0.490    Clinical Support on 07/30/2022   Component Date Value   • Hemoglobin A1C 07/30/2022 7.40 (H)      Recent labs reviewed and interpreted for clinical significance and application            Level of Care:           Ruben Cunningham MD  02/06/23  .

## 2023-02-10 ENCOUNTER — TELEPHONE (OUTPATIENT)
Dept: FAMILY MEDICINE CLINIC | Facility: CLINIC | Age: 58
End: 2023-02-10

## 2023-02-10 DIAGNOSIS — E11.65 TYPE 2 DIABETES MELLITUS WITH HYPERGLYCEMIA, WITHOUT LONG-TERM CURRENT USE OF INSULIN: Primary | ICD-10-CM

## 2023-02-10 RX ORDER — LISINOPRIL 2.5 MG/1
2.5 TABLET ORAL
Qty: 90 TABLET | Refills: 1 | Status: SHIPPED | OUTPATIENT
Start: 2023-02-10

## 2023-02-13 ENCOUNTER — TELEPHONE (OUTPATIENT)
Dept: FAMILY MEDICINE CLINIC | Facility: CLINIC | Age: 58
End: 2023-02-13

## 2023-02-13 RX ORDER — ISOSORBIDE MONONITRATE 30 MG/1
30 TABLET, EXTENDED RELEASE ORAL
Qty: 30 TABLET | Refills: 0 | Status: SHIPPED | OUTPATIENT
Start: 2023-02-13

## 2023-02-13 NOTE — TELEPHONE ENCOUNTER
Caller: [employer]    Relationship to patient: Other    Best call back number: [phone]    Patient is needing: [employer] CALLED AND IS REQUESTING A CALL BACK WITH CLINICAL QUESTIONS ABOUT THIS PATIENT. REFERENCE #[id number]

## 2023-02-21 NOTE — PROGRESS NOTES
Cardiology Office Follow Up Visit      Primary Care Provider:  Cesar Pruitt MD    Reason for f/u:     Hospital discharge follow up for chest pain, s/p LHC      Subjective     CC:    Hospital discharge follow up for chest pain, s/p [facility]    History of Present Illness       Glenroy Penaloza is a 57 y.o. male who is a patient of Dr. Cunningham. Pmh includes HTN, HLD, vertebrobasilar thalamic stroke, normal LVEF grade 1 DD, negative bubble study.     Mr. Penaloza was seen in hospital Jan 2023 for chest discomfort. He had previously underwent stress testing which showed small fixed anterior apical defect with no reversibility.  He was experiencing chest discomfort therefore underwent cardiac cath 1/15/2023 which showed no obstructive CAD.  Cardiology recommended non cardiac causes of chest pain be evaluated.    Mr. Penaloza presents today with no acute complaints. He is overall doing well. His blood pressure is controlled. He has occasional burning chest discomfort and we discussed referral to GI if that continues.  He is not on beta blockers, I will add low dose Toprol XL.       ASSESSMENT/PLAN:      Diagnoses and all orders for this visit:    1. Hospital discharge follow-up (Primary)    2. Chest pain, unspecified type  Comments:  s/p LHC 1/15/2023 which showed no obstructive CAD    3. Essential hypertension    4. Mixed hyperlipidemia    Other orders  -     metoprolol succinate XL (TOPROL-XL) 25 MG 24 hr tablet; Take 0.5 tablets by mouth Daily for 90 days.  Dispense: 30 tablet; Refill: 3      Current outpatient and discharge medications have been reconciled for the patient.  Reviewed by: JANETT Le  MEDICAL DECISION MAKING:  Mr. Penaloza presents today for hospital discharge follow up. He is doing well. He underwent cardiac cath which showed no obstructive CAD. He has been advised to have non cardiac causes of chest pain evaluated should chest pain reoccur. I will add beta blockers as he is currently not on one, heart  rate is in the 80s. Blood pressure controlled. We will see him back in 6 mo or sooner should new issues arise.           Past Medical History:   Diagnosis Date   • Atypical rash    • Diabetes mellitus, type 2 (HCC)    • Disorder of male genital organs    • Elevated glucose    • Essential hypertension 07/30/2020   • Glucosuria    • Hyperlipidemia    • Stroke (HCC) 6/9/22   • TIA (transient ischemic attack)        Past Surgical History:   Procedure Laterality Date   • CARDIAC CATHETERIZATION N/A 1/15/2023    Procedure: Left Heart Cath;  Surgeon: Arsen Sweet MD;  Location: [facility] CATH INVASIVE LOCATION;  Service: Cardiovascular;  Laterality: N/A;   • COLONOSCOPY N/A 04/04/2022    Procedure: COLONOSCOPY WITH POLYPECTOMY;  Surgeon: Arsen Payan MD;  Location: [facility] ENDOSCOPY;  Service: Gastroenterology;  Laterality: N/A;  COLON POLYP, HEMORRHOIDS         Current Outpatient Medications:   •  aspirin (Aspirin Low Dose) 81 MG EC tablet, Take 1 tablet by mouth Daily., Disp: 90 tablet, Rfl: 1  •  atorvastatin (LIPITOR) 40 MG tablet, Take 1 tablet by mouth Daily., Disp: 90 tablet, Rfl: 1  •  clopidogrel (PLAVIX) 75 MG tablet, Take 1 tablet by mouth Daily., Disp: 30 tablet, Rfl: 3  •  Jardiance 25 MG tablet tablet, TAKE 1 TABLET BY MOUTH EVERY DAY, Disp: 90 tablet, Rfl: 1  •  metFORMIN ER (GLUCOPHAGE-XR) 500 MG 24 hr tablet, Take 2 tablets by mouth 2 (Two) Times a Day., Disp: 360 tablet, Rfl: 1  •  nitroglycerin (NITROSTAT) 0.4 MG SL tablet, Place 1 tablet under the tongue Every 5 (Five) Minutes As Needed for Chest Pain (Only if SBP Greater Than 100). Take no more than 3 doses in 15 minutes., Disp: 30 tablet, Rfl: 12  •  glucose blood test strip, 100 each by Other route Daily. Use as instructed Dx code: E11.65, Disp: 100 each, Rfl: 2  •  isosorbide mononitrate (IMDUR) 30 MG 24 hr tablet, Take 1 tablet by mouth Daily., Disp: 30 tablet, Rfl: 0  •  lisinopril (PRINIVIL,ZESTRIL) 2.5 MG tablet, Take 1 tablet by mouth  Daily., Disp: 90 tablet, Rfl: 1  •  metoprolol succinate XL (TOPROL-XL) 25 MG 24 hr tablet, Take 0.5 tablets by mouth Daily for 90 days., Disp: 30 tablet, Rfl: 3  •  Microlet Lancets misc, 1 each Daily. Dx code: E11.65, Disp: 100 each, Rfl: 2    Social History     Socioeconomic History   • Marital status:    Tobacco Use   • Smoking status: Never     Passive exposure: Never   • Smokeless tobacco: Former     Quit date: 8/1/2021   • Tobacco comments:     Smokeless tobacco 40 years until 8/1/21   Vaping Use   • Vaping Use: Never used   Substance and Sexual Activity   • Alcohol use: Yes     Alcohol/week: 10.0 standard drinks     Types: 10 Cans of beer per week     Comment: current some day    • Drug use: Never   • Sexual activity: Yes     Partners: Female     Birth control/protection: None       Family History   Problem Relation Age of Onset   • Cancer Mother    • Cancer Father    • Heart disease Brother 58        CABG   • Cancer Maternal Uncle    • Cancer Other    • Colon cancer Neg Hx    • Malig Hyperthermia Neg Hx        The following portions of the patient's history were reviewed and updated as appropriate: allergies, current medications, past family history, past medical history, past social history, past surgical history and problem list.    Review of Systems   Constitutional: Negative for chills, diaphoresis and malaise/fatigue.   Cardiovascular: Negative for chest pain, dyspnea on exertion, irregular heartbeat, leg swelling, near-syncope, orthopnea, palpitations, paroxysmal nocturnal dyspnea and syncope.   Respiratory: Negative for cough, shortness of breath, sleep disturbances due to breathing and sputum production.    Gastrointestinal: Negative for change in bowel habit.   Genitourinary: Negative for urgency.   Neurological: Negative for dizziness and headaches.   Psychiatric/Behavioral: Negative for altered mental status.       Pertinent items are noted in HPI, all other systems reviewed and  "negative    /70 (BP Location: Right arm, Patient Position: Sitting)   Pulse 80   Resp 18   Ht 172.7 cm (68\")   Wt 90.3 kg (199 lb)   BMI 30.26 kg/m² .  Objective     Constitutional:       Appearance: Not in distress.   Neck:      Vascular: JVD normal.   Pulmonary:      Effort: Pulmonary effort is normal.      Breath sounds: Normal breath sounds.   Cardiovascular:      Normal rate. Regular rhythm.   Pulses:     Intact distal pulses.   Edema:     Peripheral edema absent.   Abdominal:      General: Bowel sounds are normal.      Palpations: Abdomen is soft.   Musculoskeletal: Normal range of motion. Skin:     General: Skin is warm and dry.   Neurological:      General: No focal deficit present.      Mental Status: Oriented to person, place and time.           Procedures    EKG ordered by and reviewed by me in office                 "

## 2023-03-01 ENCOUNTER — TELEPHONE (OUTPATIENT)
Dept: FAMILY MEDICINE CLINIC | Facility: CLINIC | Age: 58
End: 2023-03-01
Payer: COMMERCIAL

## 2023-03-01 NOTE — TELEPHONE ENCOUNTER
Pt called questioning the disability formed we recently filled out for him. Pt stated that it should have a return date of feb 1st as you all agreed on at office visit. I told pt I would send you a message and see what we could do. The return date on paper work is 1/27/23

## 2023-03-02 NOTE — TELEPHONE ENCOUNTER
I called pt and he asked if we would fill it out until 02-01-23. He did not mention to cardiologist because he thought it was done. I can print it out and fix the dates for pt

## 2023-03-07 DIAGNOSIS — E11.65 TYPE 2 DIABETES MELLITUS WITH HYPERGLYCEMIA, WITHOUT LONG-TERM CURRENT USE OF INSULIN: ICD-10-CM

## 2023-03-07 RX ORDER — METFORMIN HYDROCHLORIDE 500 MG/1
TABLET, EXTENDED RELEASE ORAL
Qty: 360 TABLET | Refills: 1 | Status: SHIPPED | OUTPATIENT
Start: 2023-03-07

## 2023-05-01 RX ORDER — CLOPIDOGREL BISULFATE 75 MG/1
75 TABLET ORAL DAILY
Qty: 30 TABLET | Refills: 3 | Status: SHIPPED | OUTPATIENT
Start: 2023-05-01

## 2023-05-01 NOTE — TELEPHONE ENCOUNTER
Bertha from [facility] called and said that the usual provider is not able to fill Plavix at this time. He is out. Would you be willing to fill this one time

## 2023-05-30 DIAGNOSIS — E11.65 TYPE 2 DIABETES MELLITUS WITH HYPERGLYCEMIA, WITHOUT LONG-TERM CURRENT USE OF INSULIN: ICD-10-CM

## 2023-05-30 RX ORDER — LISINOPRIL 2.5 MG/1
2.5 TABLET ORAL
Qty: 90 TABLET | Refills: 0 | Status: SHIPPED | OUTPATIENT
Start: 2023-05-30

## 2023-06-01 DIAGNOSIS — E11.65 TYPE 2 DIABETES MELLITUS WITH HYPERGLYCEMIA, WITHOUT LONG-TERM CURRENT USE OF INSULIN: ICD-10-CM

## 2023-06-01 RX ORDER — EMPAGLIFLOZIN 25 MG/1
TABLET, FILM COATED ORAL
Qty: 90 TABLET | Refills: 1 | Status: SHIPPED | OUTPATIENT
Start: 2023-06-01

## 2023-09-20 DIAGNOSIS — I10 PRIMARY HYPERTENSION: ICD-10-CM

## 2023-09-20 RX ORDER — METOPROLOL SUCCINATE 25 MG/1
TABLET, EXTENDED RELEASE ORAL
Qty: 30 TABLET | Refills: 3 | Status: SHIPPED | OUTPATIENT
Start: 2023-09-20

## 2023-12-15 DIAGNOSIS — I10 PRIMARY HYPERTENSION: ICD-10-CM

## 2023-12-15 DIAGNOSIS — Z86.73 HISTORY OF STROKE: ICD-10-CM

## 2023-12-15 RX ORDER — LISINOPRIL 2.5 MG/1
2.5 TABLET ORAL DAILY
Qty: 90 TABLET | Refills: 1 | Status: SHIPPED | OUTPATIENT
Start: 2023-12-15

## 2023-12-15 RX ORDER — ISOSORBIDE MONONITRATE 30 MG/1
30 TABLET, EXTENDED RELEASE ORAL DAILY
Qty: 90 TABLET | Refills: 1 | Status: SHIPPED | OUTPATIENT
Start: 2023-12-15

## 2023-12-15 RX ORDER — ASPIRIN 81 MG/1
81 TABLET, COATED ORAL DAILY
Qty: 90 TABLET | Refills: 1 | Status: SHIPPED | OUTPATIENT
Start: 2023-12-15

## 2024-01-02 ENCOUNTER — OFFICE VISIT (OUTPATIENT)
Dept: FAMILY MEDICINE CLINIC | Facility: CLINIC | Age: 59
End: 2024-01-02
Payer: COMMERCIAL

## 2024-01-02 VITALS
HEIGHT: 68 IN | DIASTOLIC BLOOD PRESSURE: 80 MMHG | SYSTOLIC BLOOD PRESSURE: 130 MMHG | OXYGEN SATURATION: 96 % | BODY MASS INDEX: 31.07 KG/M2 | HEART RATE: 84 BPM | WEIGHT: 205 LBS

## 2024-01-02 DIAGNOSIS — E11.65 TYPE 2 DIABETES MELLITUS WITH HYPERGLYCEMIA, WITHOUT LONG-TERM CURRENT USE OF INSULIN: ICD-10-CM

## 2024-01-02 DIAGNOSIS — Z12.5 SCREENING PSA (PROSTATE SPECIFIC ANTIGEN): ICD-10-CM

## 2024-01-02 DIAGNOSIS — Z86.73 HISTORY OF STROKE: ICD-10-CM

## 2024-01-02 DIAGNOSIS — Z23 NEED FOR TDAP VACCINATION: Primary | ICD-10-CM

## 2024-01-02 DIAGNOSIS — I10 PRIMARY HYPERTENSION: ICD-10-CM

## 2024-01-02 RX ORDER — ATORVASTATIN CALCIUM 40 MG/1
40 TABLET, FILM COATED ORAL DAILY
Qty: 90 TABLET | Refills: 1 | Status: SHIPPED | OUTPATIENT
Start: 2024-01-02

## 2024-01-02 RX ORDER — ASPIRIN 81 MG/1
81 TABLET ORAL DAILY
Qty: 90 TABLET | Refills: 1 | Status: SHIPPED | OUTPATIENT
Start: 2024-01-02

## 2024-01-02 RX ORDER — METFORMIN HYDROCHLORIDE 500 MG/1
1000 TABLET, EXTENDED RELEASE ORAL 2 TIMES DAILY
Qty: 360 TABLET | Refills: 1 | Status: SHIPPED | OUTPATIENT
Start: 2024-01-02

## 2024-01-02 RX ORDER — ISOSORBIDE MONONITRATE 30 MG/1
30 TABLET, EXTENDED RELEASE ORAL DAILY
Qty: 90 TABLET | Refills: 1 | Status: SHIPPED | OUTPATIENT
Start: 2024-01-02

## 2024-01-02 RX ORDER — LISINOPRIL 2.5 MG/1
2.5 TABLET ORAL DAILY
Qty: 90 TABLET | Refills: 1 | Status: SHIPPED | OUTPATIENT
Start: 2024-01-02

## 2024-01-02 RX ORDER — ATORVASTATIN CALCIUM 40 MG/1
40 TABLET, FILM COATED ORAL DAILY
Qty: 90 TABLET | Refills: 1 | OUTPATIENT
Start: 2024-01-02

## 2024-01-02 RX ORDER — CLOPIDOGREL BISULFATE 75 MG/1
75 TABLET ORAL DAILY
Qty: 90 TABLET | Refills: 1 | Status: SHIPPED | OUTPATIENT
Start: 2024-01-02

## 2024-01-02 NOTE — PROGRESS NOTES
Chief Complaint  Diabetes and Hypertension    Subjective          Glenroy Penaloza presents to [facility]  Diabetes  He presents for his follow-up diabetic visit. He has type 2 diabetes mellitus. His disease course has been stable. There are no hypoglycemic associated symptoms. Pertinent negatives for hypoglycemia include no headaches or sweats. There are no diabetic associated symptoms. Pertinent negatives for diabetes include no blurred vision and no chest pain. There are no hypoglycemic complications. Symptoms are stable. Risk factors for coronary artery disease include diabetes mellitus, family history, dyslipidemia, hypertension and male sex. Current diabetic treatment includes oral agent (dual therapy). He is compliant with treatment all of the time. His weight is stable. He is following a generally healthy diet. He participates in exercise intermittently. An ACE inhibitor/angiotensin II receptor blocker is being taken. He sees a podiatrist.Eye exam is current.   Hypertension  This is a chronic problem. The current episode started more than 1 year ago. The problem has been gradually improving since onset. The problem is controlled. Pertinent negatives include no anxiety, blurred vision, chest pain, headaches, malaise/fatigue, neck pain, orthopnea, palpitations, peripheral edema, PND, shortness of breath or sweats. There are no associated agents to hypertension. Current antihypertension treatment includes beta blockers and diuretics. The current treatment provides significant improvement. There are no compliance problems.    Hyperlipidemia  This is a chronic problem. The current episode started more than 1 year ago. The problem is controlled. Recent lipid tests were reviewed and are variable. There are no known factors aggravating his hyperlipidemia. Pertinent negatives include no chest pain, focal sensory loss, focal weakness, leg pain, myalgias or shortness of breath. Current  antihyperlipidemic treatment includes statins. The current treatment provides significant improvement of lipids. There are no compliance problems.        BMI is >= 30 and <35. (Class 1 Obesity). The following options were offered after discussion;: exercise counseling/recommendations and nutrition counseling/recommendations       Objective   No Known Allergies  Immunization History   Administered Date(s) Administered    COVID-19 ([facility]ISSA) 07/18/2021    Tdap 01/02/2024     Past Medical History:   Diagnosis Date    Atypical rash     Diabetes mellitus, type 2     Disorder of male genital organs     Elevated glucose     Essential hypertension 07/30/2020    Glucosuria     Hyperlipidemia     LANDRY on CPAP 06/15/2022    Stroke 6/9/22    TIA (transient ischemic attack)       Past Surgical History:   Procedure Laterality Date    CARDIAC CATHETERIZATION N/A 1/15/2023    Procedure: Left Heart Cath;  Surgeon: Arsen Sweet MD;  Location: [facility] CATH INVASIVE LOCATION;  Service: Cardiovascular;  Laterality: N/A;    COLONOSCOPY N/A 04/04/2022    Procedure: COLONOSCOPY WITH POLYPECTOMY;  Surgeon: Arsen Payan MD;  Location: [facility] ED ENDOSCOPY;  Service: Gastroenterology;  Laterality: N/A;  COLON POLYP, HEMORRHOIDS      Social History     Socioeconomic History    Marital status:    Tobacco Use    Smoking status: Never     Passive exposure: Never    Smokeless tobacco: Former     Quit date: 8/1/2021    Tobacco comments:     Smokeless tobacco 40 years until 8/1/21   Vaping Use    Vaping Use: Never used   Substance and Sexual Activity    Alcohol use: Yes     Alcohol/week: 10.0 standard drinks of alcohol     Types: 10 Cans of beer per week     Comment: current some day     Drug use: Never    Sexual activity: Yes     Partners: Female     Birth control/protection: None        Current Outpatient Medications:     aspirin (Aspirin Low Dose) 81 MG EC tablet, Take 1 tablet by mouth Daily., Disp: 90 tablet, Rfl: 1    atorvastatin  "(LIPITOR) 40 MG tablet, Take 1 tablet by mouth Daily., Disp: 90 tablet, Rfl: 1    clopidogrel (PLAVIX) 75 MG tablet, Take 1 tablet by mouth Daily., Disp: 90 tablet, Rfl: 1    empagliflozin (Jardiance) 25 MG tablet tablet, Take 1 tablet by mouth Daily., Disp: 90 tablet, Rfl: 1    isosorbide mononitrate (IMDUR) 30 MG 24 hr tablet, Take 1 tablet by mouth Daily., Disp: 90 tablet, Rfl: 1    lisinopril (PRINIVIL,ZESTRIL) 2.5 MG tablet, Take 1 tablet by mouth Daily., Disp: 90 tablet, Rfl: 1    metFORMIN ER (GLUCOPHAGE-XR) 500 MG 24 hr tablet, Take 2 tablets by mouth 2 (Two) Times a Day., Disp: 360 tablet, Rfl: 1    metoprolol succinate XL (TOPROL-XL) 25 MG 24 hr tablet, TAKE ONE-HALF TABLET BY MOUTH EVERY DAY, Disp: 30 tablet, Rfl: 3    nitroglycerin (NITROSTAT) 0.4 MG SL tablet, Place 1 tablet under the tongue Every 5 (Five) Minutes As Needed for Chest Pain (Only if SBP Greater Than 100). Take no more than 3 doses in 15 minutes., Disp: 30 tablet, Rfl: 12   Family History   Problem Relation Age of Onset    Cancer Mother     Cancer Father     Heart disease Brother 58        CABG    Cancer Maternal Uncle     Cancer Other     Colon cancer Neg Hx     Malig Hyperthermia Neg Hx           Vital Signs:   Vitals:    01/02/24 1549   BP: 130/80   Pulse: 84   SpO2: 96%   Weight: 93 kg (205 lb)   Height: 171.5 cm (67.5\")       Review of Systems   Constitutional:  Negative for malaise/fatigue.   Eyes:  Negative for blurred vision.   Respiratory:  Negative for shortness of breath.    Cardiovascular:  Negative for chest pain, palpitations, orthopnea and PND.   Musculoskeletal:  Negative for myalgias and neck pain.   Neurological:  Negative for focal weakness and headaches.      Physical Exam  Vitals reviewed.   Constitutional:       Appearance: Normal appearance. He is well-developed.   HENT:      Head: Normocephalic and atraumatic.      Right Ear: External ear normal.      Left Ear: External ear normal.      Mouth/Throat:      Pharynx: " No oropharyngeal exudate.   Eyes:      Conjunctiva/sclera: Conjunctivae normal.      Pupils: Pupils are equal, round, and reactive to light.   Cardiovascular:      Rate and Rhythm: Normal rate and regular rhythm.      Pulses: Normal pulses.           Dorsalis pedis pulses are 2+ on the right side and 2+ on the left side.      Heart sounds: Normal heart sounds. No murmur heard.     No friction rub. No gallop.   Pulmonary:      Effort: Pulmonary effort is normal.      Breath sounds: Normal breath sounds. No wheezing or rhonchi.   Abdominal:      General: Abdomen is flat. Bowel sounds are normal. There is no distension.      Palpations: Abdomen is soft. There is no mass.      Tenderness: There is no abdominal tenderness. There is no guarding or rebound.      Hernia: No hernia is present.   Musculoskeletal:         General: Normal range of motion.      Cervical back: Normal range of motion and neck supple.      Right foot: Normal range of motion. No deformity, bunion, Charcot foot, foot drop or prominent metatarsal heads.      Left foot: Normal range of motion. No deformity, bunion, Charcot foot, foot drop or prominent metatarsal heads.   Feet:      Right foot:      Protective Sensation: 3 sites tested.  3 sites sensed.      Skin integrity: Skin integrity normal. No ulcer, blister or callus.      Toenail Condition: Right toenails are normal.      Left foot:      Protective Sensation: 3 sites tested.  3 sites sensed.      Skin integrity: Skin integrity normal. No ulcer, blister or callus.      Toenail Condition: Left toenails are normal.      Comments:      Skin:     General: Skin is warm and dry.      Capillary Refill: Capillary refill takes less than 2 seconds.   Neurological:      General: No focal deficit present.      Mental Status: He is alert and oriented to person, place, and time.      Cranial Nerves: No cranial nerve deficit.   Psychiatric:         Mood and Affect: Mood and affect normal.         Behavior:  Behavior normal.         Thought Content: Thought content normal.         Judgment: Judgment normal.        Result Review :   The following data was reviewed by: Cesar Pruitt MD on 01/02/2024:  CMP          1/13/2023    22:22 1/19/2023    17:55 7/7/2023    09:14   CMP   Glucose 120  115  137    BUN 17  17  18    Creatinine 0.91  1.04  0.98    EGFR 97.7  83.2  88.8    Sodium 136  141  138    Potassium 4.0  4.1  4.1    Chloride 100  104  103    Calcium 9.2  9.6  9.7    Total Protein 7.4  7.3  7.3    Albumin 4.5  4.6  4.7    Globulin 2.9  2.7  2.6    Total Bilirubin 0.3  0.3  0.4    Alkaline Phosphatase 55  45  47    AST (SGOT) 29  22  27    ALT (SGPT) 33  30  27    Albumin/Globulin Ratio 1.6  1.7  1.8    BUN/Creatinine Ratio 18.7  16.3  18.4    Anion Gap 12.0  12.0  11.4      CBC          1/13/2023    22:22 1/19/2023    17:55 7/7/2023    09:14   CBC   WBC 5.60  5.56  4.89    RBC 4.50  4.50  4.66    Hemoglobin 14.4  14.6  14.7    Hematocrit 40.9  41.2  42.1    MCV 91.0  91.6  90.3    MCH 32.0  32.4  31.5    MCHC 35.2  35.4  34.9    RDW 12.8  12.1  12.1    Platelets 163  182  156      Lipid Panel          1/15/2023    04:21 7/7/2023    09:14   Lipid Panel   Total Cholesterol 145  153    Triglycerides 204  327    HDL Cholesterol 41  39    VLDL Cholesterol 34  51    LDL Cholesterol  70  63    LDL/HDL Ratio 1.54  1.25      TSH          7/7/2023    09:14   TSH   TSH 1.900      Most Recent A1C          7/7/2023    09:14   HGBA1C Most Recent   Hemoglobin A1C 7.50      Microalbumin          7/7/2023    09:38   Microalbumin   Microalbumin, Urine 2.0                Assessment and Plan    Diagnoses and all orders for this visit:    1. Need for Tdap vaccination (Primary)  -     Tdap Vaccine => 8yo IM (BOOSTRIX)    2. History of stroke  -     aspirin (Aspirin Low Dose) 81 MG EC tablet; Take 1 tablet by mouth Daily.  Dispense: 90 tablet; Refill: 1  -     clopidogrel (PLAVIX) 75 MG tablet; Take 1 tablet by mouth Daily.  Dispense:  90 tablet; Refill: 1    3. Type 2 diabetes mellitus with hyperglycemia, without long-term current use of insulin  -     empagliflozin (Jardiance) 25 MG tablet tablet; Take 1 tablet by mouth Daily.  Dispense: 90 tablet; Refill: 1  -     metFORMIN ER (GLUCOPHAGE-XR) 500 MG 24 hr tablet; Take 2 tablets by mouth 2 (Two) Times a Day.  Dispense: 360 tablet; Refill: 1  -     CBC Auto Differential  -     Comprehensive Metabolic Panel  -     Hemoglobin A1c  -     Lipid Panel  -     MicroAlbumin, Urine, Random - Urine, Clean Catch    4. Primary hypertension  -     isosorbide mononitrate (IMDUR) 30 MG 24 hr tablet; Take 1 tablet by mouth Daily.  Dispense: 90 tablet; Refill: 1  -     lisinopril (PRINIVIL,ZESTRIL) 2.5 MG tablet; Take 1 tablet by mouth Daily.  Dispense: 90 tablet; Refill: 1  -     TSH+Free T4    5. Screening PSA (prostate specific antigen)  -     PSA Screen    Other orders  -     atorvastatin (LIPITOR) 40 MG tablet; Take 1 tablet by mouth Daily.  Dispense: 90 tablet; Refill: 1            Follow Up   Return in about 6 months (around 7/2/2024) for Recheck.  Patient was given instructions and counseling regarding his condition or for health maintenance advice. Please see specific information pulled into the AVS if appropriate.

## 2024-01-06 ENCOUNTER — CLINICAL SUPPORT (OUTPATIENT)
Dept: FAMILY MEDICINE CLINIC | Facility: CLINIC | Age: 59
End: 2024-01-06
Payer: COMMERCIAL

## 2024-01-06 DIAGNOSIS — E11.65 TYPE 2 DIABETES MELLITUS WITH HYPERGLYCEMIA, UNSPECIFIED WHETHER LONG TERM INSULIN USE: Chronic | ICD-10-CM

## 2024-01-06 DIAGNOSIS — I10 ESSENTIAL HYPERTENSION: Primary | Chronic | ICD-10-CM

## 2024-01-06 DIAGNOSIS — E66.9 CLASS 1 OBESITY: Chronic | ICD-10-CM

## 2024-01-06 DIAGNOSIS — H52.00 HYPERMETROPIA, UNSPECIFIED LATERALITY: ICD-10-CM

## 2024-01-06 DIAGNOSIS — E78.2 MIXED HYPERLIPIDEMIA: Chronic | ICD-10-CM

## 2024-01-06 LAB
ALBUMIN SERPL-MCNC: 4.4 G/DL (ref 3.5–5.2)
ALBUMIN UR-MCNC: 1.8 MG/DL
ALBUMIN/GLOB SERPL: 1.6 G/DL
ALP SERPL-CCNC: 49 U/L (ref 39–117)
ALT SERPL W P-5'-P-CCNC: 26 U/L (ref 1–41)
ANION GAP SERPL CALCULATED.3IONS-SCNC: 9.8 MMOL/L (ref 5–15)
AST SERPL-CCNC: 27 U/L (ref 1–40)
BASOPHILS # BLD AUTO: 0.03 10*3/MM3 (ref 0–0.2)
BASOPHILS NFR BLD AUTO: 0.6 % (ref 0–1.5)
BILIRUB SERPL-MCNC: 0.6 MG/DL (ref 0–1.2)
BUN SERPL-MCNC: 17 MG/DL (ref 6–20)
BUN/CREAT SERPL: 17.7 (ref 7–25)
CALCIUM SPEC-SCNC: 9.2 MG/DL (ref 8.6–10.5)
CHLORIDE SERPL-SCNC: 103 MMOL/L (ref 98–107)
CHOLEST SERPL-MCNC: 164 MG/DL (ref 0–200)
CO2 SERPL-SCNC: 24.2 MMOL/L (ref 22–29)
CREAT SERPL-MCNC: 0.96 MG/DL (ref 0.76–1.27)
DEPRECATED RDW RBC AUTO: 38.8 FL (ref 37–54)
EGFRCR SERPLBLD CKD-EPI 2021: 91.1 ML/MIN/1.73
EOSINOPHIL # BLD AUTO: 0.13 10*3/MM3 (ref 0–0.4)
EOSINOPHIL NFR BLD AUTO: 2.6 % (ref 0.3–6.2)
ERYTHROCYTE [DISTWIDTH] IN BLOOD BY AUTOMATED COUNT: 12.1 % (ref 12.3–15.4)
GLOBULIN UR ELPH-MCNC: 2.7 GM/DL
GLUCOSE SERPL-MCNC: 153 MG/DL (ref 65–99)
HBA1C MFR BLD: 7.6 % (ref 4.8–5.6)
HCT VFR BLD AUTO: 43.9 % (ref 37.5–51)
HDLC SERPL-MCNC: 46 MG/DL (ref 40–60)
HGB BLD-MCNC: 14.7 G/DL (ref 13–17.7)
IMM GRANULOCYTES # BLD AUTO: 0.02 10*3/MM3 (ref 0–0.05)
IMM GRANULOCYTES NFR BLD AUTO: 0.4 % (ref 0–0.5)
LDLC SERPL CALC-MCNC: 95 MG/DL (ref 0–100)
LDLC/HDLC SERPL: 2 {RATIO}
LYMPHOCYTES # BLD AUTO: 1.92 10*3/MM3 (ref 0.7–3.1)
LYMPHOCYTES NFR BLD AUTO: 37.9 % (ref 19.6–45.3)
MCH RBC QN AUTO: 29.6 PG (ref 26.6–33)
MCHC RBC AUTO-ENTMCNC: 33.5 G/DL (ref 31.5–35.7)
MCV RBC AUTO: 88.5 FL (ref 79–97)
MONOCYTES # BLD AUTO: 0.52 10*3/MM3 (ref 0.1–0.9)
MONOCYTES NFR BLD AUTO: 10.3 % (ref 5–12)
NEUTROPHILS NFR BLD AUTO: 2.44 10*3/MM3 (ref 1.7–7)
NEUTROPHILS NFR BLD AUTO: 48.2 % (ref 42.7–76)
NRBC BLD AUTO-RTO: 0 /100 WBC (ref 0–0.2)
PLATELET # BLD AUTO: 178 10*3/MM3 (ref 140–450)
PMV BLD AUTO: 10.4 FL (ref 6–12)
POTASSIUM SERPL-SCNC: 4.3 MMOL/L (ref 3.5–5.2)
PROT SERPL-MCNC: 7.1 G/DL (ref 6–8.5)
PSA SERPL-MCNC: 0.5 NG/ML (ref 0–4)
RBC # BLD AUTO: 4.96 10*6/MM3 (ref 4.14–5.8)
SODIUM SERPL-SCNC: 137 MMOL/L (ref 136–145)
T4 FREE SERPL-MCNC: 1.17 NG/DL (ref 0.93–1.7)
TRIGL SERPL-MCNC: 131 MG/DL (ref 0–150)
TSH SERPL DL<=0.05 MIU/L-ACNC: 1.28 UIU/ML (ref 0.27–4.2)
VLDLC SERPL-MCNC: 23 MG/DL (ref 5–40)
WBC NRBC COR # BLD AUTO: 5.06 10*3/MM3 (ref 3.4–10.8)

## 2024-01-06 PROCEDURE — 83036 HEMOGLOBIN GLYCOSYLATED A1C: CPT | Performed by: FAMILY MEDICINE

## 2024-01-06 PROCEDURE — G0103 PSA SCREENING: HCPCS | Performed by: FAMILY MEDICINE

## 2024-01-06 PROCEDURE — 36415 COLL VENOUS BLD VENIPUNCTURE: CPT | Performed by: FAMILY MEDICINE

## 2024-01-06 PROCEDURE — 84439 ASSAY OF FREE THYROXINE: CPT | Performed by: FAMILY MEDICINE

## 2024-01-06 PROCEDURE — 82043 UR ALBUMIN QUANTITATIVE: CPT | Performed by: FAMILY MEDICINE

## 2024-01-06 PROCEDURE — 80050 GENERAL HEALTH PANEL: CPT | Performed by: FAMILY MEDICINE

## 2024-01-06 PROCEDURE — 80061 LIPID PANEL: CPT | Performed by: FAMILY MEDICINE

## 2024-01-06 NOTE — PROGRESS NOTES
Venipuncture Blood Specimen Collection  Venipuncture performed in [place] by Dena Judge with good hemostasis. Patient tolerated the procedure well without complications.   01/06/24   Evette Pruitt MA

## 2024-02-03 NOTE — ED PROVIDER NOTES
IMPORTANT EVENTS THIS SHIFT:  Pt alert oriented with PD cath in place capped . Dialysis nurse worked with pt this morning. Contact isolation in place for stool infection. Due meds given. Pt went fpr the IR procedure this afternoon for tunneled HD cath left jugular, received with cap in place on both port but both ports clamp   1900pm Pt complained of vomiting previously ingested food. Night nurse to give PRN med for nausea.    IMPORTANT EVENTS COMING UP/GOALS (PLEASE INCLUDE WHITE BOARD AND DISCHARGE BOARD UPDATES):   PATIENT SPECIAL NEEDS/ACCOMMODATIONS:    Pt due for hemodialysis tomorrow.             Subjective   History of Present Illness  History Provided By: Pt    Chief Complaint: Numbness on right face, right arm, right leg  Onset: 10 minutes prior to arrival  Timing: Worsening  Location: Right face, right arm, right leg  Quality: Decree sensation, not completely numb  Severity: Mild to moderate  Modifying Factors: None    Other: 57-year-old male with prior medical history of hypertension, hyperlipidemia, diabetes presents for new onset right-sided numbness.  Occurred approximately 10 minutes prior to arrival while he was driving.  Never had anything like this before.  No weakness.  Denies any issues walking.  No other recent symptoms.  Does not feel dizzy.    Review of Systems   Cardiovascular: Negative for chest pain.   Neurological: Positive for numbness. Negative for dizziness, seizures, syncope, facial asymmetry, speech difficulty, weakness, light-headedness and headaches.   All other systems reviewed and are negative.      Past Medical History:   Diagnosis Date   • Atypical rash    • Diabetes mellitus, type 2 (HCC)    • Disorder of male genital organs    • Elevated glucose    • Essential hypertension 07/30/2020   • Glucosuria    • Hyperlipidemia        No Known Allergies    Past Surgical History:   Procedure Laterality Date   • COLONOSCOPY N/A 4/4/2022    Procedure: COLONOSCOPY WITH POLYPECTOMY;  Surgeon: Arsen Payan MD;  Location: [facility] ENDOSCOPY;  Service: Gastroenterology;  Laterality: N/A;  COLON POLYP, HEMORRHOIDS       Family History   Problem Relation Age of Onset   • Cancer Mother    • Cancer Father    • Cancer Maternal Uncle    • Cancer Other    • Colon cancer Neg Hx    • Malig Hyperthermia Neg Hx        Social History     Socioeconomic History   • Marital status:    Tobacco Use   • Smoking status: Never Smoker   • Smokeless tobacco: Former User     Quit date: 8/1/2021   Vaping Use   • Vaping Use: Never used   Substance and Sexual Activity   • Alcohol use: Yes     Comment:  "current some day    • Drug use: Never           Objective   Physical Exam  Constitutional:  No acute distress.  Head:  Atraumatic.  Normocephalic.   Eyes:  No scleral icterus. Normal conjunctiva  ENT:  Moist mucosa.  No nasal discharge present.  Cardiovascular:  Well perfused.  Equal pulses.  Regular rate.  Normal capillary refill.    Pulmonary/Chest:  No respiratory distress.  Airway patent.  No tachypnea.  No accessory muscle usage.    Abdominal:  Non-distended. Non-tender.   Extremities:  No peripheral edema.  No Deformity  Skin:  Warm, dry  Neurological:  Alert and oriented x 4. PERRL.  EOMI.  Cranial nerves II-XII are intact.  Strength is equal and 5/5 in the upper and lower extremities bilaterally.  Decreased sensation on right arm, right lips, right leg compared to left.  No pronator drift.  Normal speech.  Normal cerebellar function during finger-nose-finger and heel to shin.         Procedures           ED Course      /97   Pulse 91   Temp 98.6 °F (37 °C) (Oral)   Resp 14   Ht 172.7 cm (68\")   Wt 96.2 kg (212 lb)   SpO2 96%   BMI 32.23 kg/m²   Labs Reviewed   COMPREHENSIVE METABOLIC PANEL - Abnormal; Notable for the following components:       Result Value    Glucose 230 (*)     ALT (SGPT) 73 (*)     AST (SGOT) 57 (*)     All other components within normal limits    Narrative:     GFR Normal >60  Chronic Kidney Disease <60  Kidney Failure <15     POCT GLUCOSE FINGERSTICK - Abnormal; Notable for the following components:    Glucose 208 (*)     All other components within normal limits   POCT GLUCOSE FINGERSTICK - Abnormal; Notable for the following components:    Glucose 215 (*)     All other components within normal limits   COVID-19,CEPHEID/BIJAL,COR/AMBROSIO/PAD/BREANNE IN-HOUSE,NP SWAB IN TRANSPORT MEDIA 3-4 HR TAT, RT-PCR - Normal    Narrative:     Fact sheet for providers: https://www.fda.gov/media/302273/download     Fact sheet for patients: https://www.fda.gov/media/669746/download  Fact sheet for " providers: https://www.fda.gov/media/918558/download    Fact sheet for patients: https://www.fda.gov/media/280045/download    Test performed by PCR.   PROTIME-INR - Normal   APTT - Normal   TROPONIN (IN-HOUSE) - Normal    Narrative:     Troponin T Reference Range:  <= 0.03 ng/mL-   Negative for AMI  >0.03 ng/mL-     Abnormal for myocardial necrosis.  Clinicians would have to utilize clinical acumen, EKG, Troponin and serial changes to determine if it is an Acute Myocardial Infarction or myocardial injury due to an underlying chronic condition.       Results may be falsely decreased if patient taking Biotin.     CBC WITH AUTO DIFFERENTIAL - Normal   T4, FREE - Normal    Narrative:     Results may be falsely increased if patient taking Biotin.     TSH - Normal   COVID PRE-OP / PRE-PROCEDURE SCREENING ORDER (NO ISOLATION)    Narrative:     The following orders were created for panel order COVID PRE-OP / PRE-PROCEDURE SCREENING ORDER (NO ISOLATION) - Swab, Nasopharynx.  Procedure                               Abnormality         Status                     ---------                               -----------         ------                     COVID-19,CEPHEID/BIJAL,CO...[[id number]]  Normal              Final result                 Please view results for these tests on the individual orders.   RAINBOW DRAW    Narrative:     The following orders were created for panel order [place] Draw.  Procedure                               Abnormality         Status                     ---------                               -----------         ------                     Green Top (Gel)[[id number]]                                  Final result               Lavender Top[[id number]]                                     Final result               Gold Top - SST[[id number]]                                   Final result               Light Blue Top[[id number]]                                   Final result                 Please view results  for these tests on the individual orders.   FOLATE   VITAMIN B12   POCT GLUCOSE FINGERSTICK   POCT GLUCOSE FINGERSTICK   POCT GLUCOSE FINGERSTICK   POCT GLUCOSE FINGERSTICK   POCT GLUCOSE FINGERSTICK   POCT GLUCOSE FINGERSTICK   POCT GLUCOSE FINGERSTICK   POCT GLUCOSE FINGERSTICK   POCT GLUCOSE FINGERSTICK   POCT GLUCOSE FINGERSTICK   POCT GLUCOSE FINGERSTICK   TYPE AND SCREEN   BB ARMBAND CHECK   CBC AND DIFFERENTIAL    Narrative:     The following orders were created for panel order CBC & Differential.  Procedure                               Abnormality         Status                     ---------                               -----------         ------                     CBC Auto Differential[[id number]]        Normal              Final result                 Please view results for these tests on the individual orders.   GREEN TOP   LAVENDER TOP   GOLD TOP - SST   LIGHT BLUE TOP     Medications   sodium chloride 0.9 % flush 10 mL (has no administration in time range)   sodium chloride 0.9 % flush 10 mL (10 mL Intravenous Given 6/9/22 1017)   sodium chloride 0.9 % flush 10 mL (has no administration in time range)   aspirin tablet 325 mg (has no administration in time range)     Or   aspirin suppository 300 mg (has no administration in time range)   labetalol (NORMODYNE,TRANDATE) injection 10 mg (has no administration in time range)   nitroglycerin (NITROSTAT) SL tablet 0.4 mg (has no administration in time range)   sodium chloride 0.9 % flush 10 mL (10 mL Intravenous Given 6/9/22 1130)   sodium chloride 0.9 % flush 10 mL (has no administration in time range)   sodium chloride 0.9 % infusion (75 mL/hr Intravenous New Bag 6/9/22 1304)   aluminum-magnesium hydroxide-simethicone (MAALOX MAX) 400-400-40 MG/5ML suspension 15 mL (has no administration in time range)   acetaminophen (TYLENOL) tablet 650 mg (has no administration in time range)     Or   acetaminophen (TYLENOL) suppository 650 mg (has no administration in  time range)   ondansetron (ZOFRAN) tablet 4 mg (has no administration in time range)     Or   ondansetron (ZOFRAN) injection 4 mg (has no administration in time range)   pantoprazole (PROTONIX) injection 40 mg (40 mg Intravenous Given 6/9/22 1145)   dextrose (GLUTOSE) oral gel 15 g (has no administration in time range)   dextrose (D50W) (25 g/50 mL) IV injection 25 g (has no administration in time range)   glucagon (human recombinant) (GLUCAGEN DIAGNOSTIC) 1 mg in sterile water (preservative free) 1 mL injection (has no administration in time range)   insulin lispro (ADMELOG) injection 0-7 Units (3 Units Subcutaneous Given 6/9/22 1223)     And   insulin lispro (ADMELOG) injection 0-7 Units (has no administration in time range)   atorvastatin (LIPITOR) tablet 40 mg (has no administration in time range)   iopamidol (ISOVUE-370) 76 % injection 100 mL (100 mL Intravenous Given 6/9/22 0803)   alteplase (ACTIVASE) bolus from vial (8.82 mg Intravenous Given 6/9/22 0908)   alteplase (ACTIVASE) 100 mg kit (0 mg/kg × 98 kg Intravenous Stopped 6/9/22 1009)   sodium chloride 0.9 % infusion (0 mL/kg/hr × 98 kg Intravenous Stopped 6/9/22 1029)     CT Angiogram Neck    Result Date: 6/9/2022  1. Patent major intracranial vasculature without findings of large vessel occlusion. 2. Patent carotid arteries with mild atherosclerotic calcification. No significant stenosis by NASCET criteria. 2. Patent vertebral arteries. 3. Incidental CT findings above.  I discussed the findings with Dr. Chatman at 8:12 AM on 06/09/2022.  Electronically Signed By-Christofer Rosario MD On:6/9/2022 8:28 AM This report was finalized on [id number] by  Christofer Rosario MD.    XR Chest 1 View    Result Date: 6/9/2022  No acute chest findings.  Electronically Signed By-Ailyn Farfan MD On:6/9/2022 8:31 AM This report was finalized on [id number] by  Ailyn Farfan MD.    CT Head Without Contrast Stroke Protocol    Result Date: 6/9/2022  IMPRESSION : 1. No  intracranial hemorrhage. 2. Mild white matter findings most compatible with chronic microvascular disease.  Electronically Signed By-Christofer Rosario MD On:6/9/2022 8:08 AM This report was finalized on [id number] by  Christofer Rosario MD.    CT Angiogram Head w AI Analysis of LVO    Result Date: 6/9/2022  1. Patent major intracranial vasculature without findings of large vessel occlusion. 2. Patent carotid arteries with mild atherosclerotic calcification. No significant stenosis by NASCET criteria. 2. Patent vertebral arteries. 3. Incidental CT findings above.  I discussed the findings with Dr. Chatman at 8:12 AM on 06/09/2022.  Electronically Signed By-Christofer Rosario MD On:6/9/2022 8:28 AM This report was finalized on [id number] by  Christofer Rosario MD.                                               MDM   Critical Care Time     The high probability of sudden, clinically significant deterioration in the patient's condition required the highest level of my preparedness to intervene urgently.  The services I provided to this patient were to treat and/or prevent clinically significant deterioration that could result in: Neurologic collapse. Services included the following: chart data review, reviewing nurses notes an/or old charts, documentation time, consultant collaboration regarding findings and treatment options, vital sign assessments and ordering, interpreting and reviewing diagnostic studies/lab test.  Aggregate critical care time was 38 minutes, which includes only time during which I was engaged in work directly related to the patient's care, as described above, whether at the bedside or elsewhere in the Emergency Department. It did not include time spent performing other reported procedures or the services of residents, students, nurses or physician assistants.    \  EKG # 1  Signed and interpreted by the EP.  Time Interpreted: 8:20 AM  Rate: 76  Rhythm: Normal sinus rhythm  Axis: Within normal limits  Intervals:  Within normal limits  ST Segments: No acute ischemic changes     Comparison: None available    Level of Consciousness: alert, keenly responsive 0?   Ask month and age: both questions right 0?   Blinks eyes & squeeze hands: performs both tasks 0?   Horizontal extraocular movements: Normal 0?   Visual fields: No visual loss 0?   Facial palsy: normal symmetry 0?   Left arm motor drift: No drift for 10 sec 0?   Right arm motor drift: No drift for 10 sec 0?   Left leg motor drift: No drift for 5 sec?   Right leg motor drift: No drift for 5 sec 0?   Limb ataxia (finger to nose, heel to shin): No ataxia 0?(1 on repeat exam)   Sensation: Mild to Moderate loss 1? (still 1 on repeat exam, but pt reports to be worsening   Language/aphasia: normal, no aphasia 0?   Dysarthria: normal 0?   Extension/inattention: no abnormality 0?  Total: 1 initially, 2 on repeat exam    Symptoms appear to be worsening on reevaluation.  Discussed with Dr. No who recommends giving tPA given symptoms worsening and now patient having difficulties walking on reassessment secondary to numbness in his foot.  No contraindications.  Patient denies history of GI or intracranial hemorrhage.  Will give tPA.  Final diagnoses:   Acute CVA (cerebrovascular accident) (HCC)       ED Disposition  ED Disposition     ED Disposition   Decision to Admit    Condition   --    Comment   Level of Care: Critical Care [6]   Diagnosis: Numbness and tingling of right upper and lower extremity [[id number]]   Admitting Physician: JACKSON BROCK [[id number]]   Attending Physician: JACKSON BROCK [[id number]]   Isolate for COVID?: No [0]   Certification: I Certify That Inpatient Hospital Services Are Medically Necessary For Greater Than 2 Midnights               No follow-up provider specified.       Medication List      ASK your doctor about these medications    * metFORMIN  MG 24 hr tablet  Commonly known as: GLUCOPHAGE-XR  Ask about: Which instructions should I use?     * metFORMIN ER  500 MG 24 hr tablet  Commonly known as: GLUCOPHAGE-XR  Ask about: Which instructions should I use?         * This list has 2 medication(s) that are the same as other medications prescribed for you. Read the directions carefully, and ask your doctor or other care provider to review them with you.                 Mehran Chatman MD  06/09/22 0000

## 2024-06-28 RX ORDER — ATORVASTATIN CALCIUM 40 MG/1
40 TABLET, FILM COATED ORAL DAILY
Qty: 90 TABLET | Refills: 0 | Status: SHIPPED | OUTPATIENT
Start: 2024-06-28

## 2024-07-08 ENCOUNTER — OFFICE VISIT (OUTPATIENT)
Dept: FAMILY MEDICINE CLINIC | Facility: CLINIC | Age: 59
End: 2024-07-08
Payer: COMMERCIAL

## 2024-07-08 VITALS
WEIGHT: 208.2 LBS | HEART RATE: 84 BPM | TEMPERATURE: 97.9 F | OXYGEN SATURATION: 95 % | SYSTOLIC BLOOD PRESSURE: 130 MMHG | HEIGHT: 68 IN | DIASTOLIC BLOOD PRESSURE: 85 MMHG | BODY MASS INDEX: 31.55 KG/M2

## 2024-07-08 DIAGNOSIS — E11.65 TYPE 2 DIABETES MELLITUS WITH HYPERGLYCEMIA, WITHOUT LONG-TERM CURRENT USE OF INSULIN: ICD-10-CM

## 2024-07-08 DIAGNOSIS — Z86.73 HISTORY OF STROKE: ICD-10-CM

## 2024-07-08 DIAGNOSIS — I10 PRIMARY HYPERTENSION: ICD-10-CM

## 2024-07-08 PROCEDURE — 99214 OFFICE O/P EST MOD 30 MIN: CPT | Performed by: FAMILY MEDICINE

## 2024-07-08 RX ORDER — METFORMIN HYDROCHLORIDE 500 MG/1
1000 TABLET, EXTENDED RELEASE ORAL 2 TIMES DAILY
Qty: 360 TABLET | Refills: 1 | Status: SHIPPED | OUTPATIENT
Start: 2024-07-08

## 2024-07-08 RX ORDER — LISINOPRIL 2.5 MG/1
2.5 TABLET ORAL DAILY
Qty: 90 TABLET | Refills: 1 | Status: SHIPPED | OUTPATIENT
Start: 2024-07-08

## 2024-07-08 RX ORDER — ISOSORBIDE MONONITRATE 30 MG/1
30 TABLET, EXTENDED RELEASE ORAL DAILY
Qty: 90 TABLET | Refills: 1 | Status: SHIPPED | OUTPATIENT
Start: 2024-07-08

## 2024-07-08 RX ORDER — CLOPIDOGREL BISULFATE 75 MG/1
75 TABLET ORAL DAILY
Qty: 90 TABLET | Refills: 1 | Status: SHIPPED | OUTPATIENT
Start: 2024-07-08

## 2024-07-08 RX ORDER — ASPIRIN 81 MG/1
81 TABLET ORAL DAILY
Qty: 90 TABLET | Refills: 1 | Status: SHIPPED | OUTPATIENT
Start: 2024-07-08

## 2024-07-08 NOTE — PROGRESS NOTES
Chief Complaint  Annual Exam, Hypertension, Diabetes, and Hyperlipidemia    Subjective          Glenroy Penaloza presents to [facility]  Hypertension  This is a chronic problem. The current episode started more than 1 year ago. The problem has been improved since onset. The problem is controlled. Pertinent negatives include no anxiety, blurred vision, chest pain, headaches, malaise/fatigue, neck pain, orthopnea, palpitations, peripheral edema, PND, shortness of breath or sweats. There are no associated agents to hypertension. Risk factors for coronary artery disease include diabetes mellitus, dyslipidemia, family history and male gender. Current antihypertension treatment includes ACE inhibitors and beta blockers. The current treatment provides significant improvement. There are no compliance problems.    Hyperlipidemia  This is a chronic problem. The current episode started more than 1 year ago. The problem is controlled. Recent lipid tests were reviewed and are variable. There are no known factors aggravating his hyperlipidemia. Pertinent negatives include no chest pain, focal sensory loss, focal weakness, leg pain, myalgias or shortness of breath. Current antihyperlipidemic treatment includes statins. The current treatment provides significant improvement of lipids. There are no compliance problems.    Diabetes  He presents for his follow-up diabetic visit. He has type 2 diabetes mellitus. His disease course has been stable. Pertinent negatives for hypoglycemia include no dizziness, headaches or sweats. Pertinent negatives for diabetes include no blurred vision, no chest pain, no fatigue, no foot paresthesias, no foot ulcerations, no polydipsia, no polyphagia, no polyuria, no visual change, no weakness and no weight loss. Symptoms are stable. Current diabetic treatment includes oral agent (dual therapy). He is compliant with treatment all of the time. He is following a generally  healthy diet. He participates in exercise intermittently. An ACE inhibitor/angiotensin II receptor blocker is being taken.  Eye exam is current.                Objective   No Known Allergies  Immunization History   Administered Date(s) Administered    COVID-19 (NISSA) 07/18/2021    Tdap 01/02/2024     Past Medical History:   Diagnosis Date    Atypical rash     Diabetes mellitus, type 2     Disorder of male genital organs     Elevated glucose     Essential hypertension 07/30/2020    Glucosuria     Hyperlipidemia     LANDRY on CPAP 06/15/2022    Stroke 6/9/22    TIA (transient ischemic attack)       Past Surgical History:   Procedure Laterality Date    CARDIAC CATHETERIZATION N/A 1/15/2023    Procedure: Left Heart Cath;  Surgeon: Arsen Sweet MD;  Location: [facility] CATH INVASIVE LOCATION;  Service: Cardiovascular;  Laterality: N/A;    COLONOSCOPY N/A 04/04/2022    Procedure: COLONOSCOPY WITH POLYPECTOMY;  Surgeon: Arsen Payan MD;  Location: [facility] ENDOSCOPY;  Service: Gastroenterology;  Laterality: N/A;  COLON POLYP, HEMORRHOIDS      Social History     Socioeconomic History    Marital status:    Tobacco Use    Smoking status: Never     Passive exposure: Never    Smokeless tobacco: Former     Quit date: 8/1/2021    Tobacco comments:     Smokeless tobacco 40 years until 8/1/21   Vaping Use    Vaping status: Never Used   Substance and Sexual Activity    Alcohol use: Yes     Alcohol/week: 10.0 standard drinks of alcohol     Types: 10 Cans of beer per week     Comment: current some day     Drug use: Never    Sexual activity: Yes     Partners: Female     Birth control/protection: None        Current Outpatient Medications:     aspirin (Aspirin Low Dose) 81 MG EC tablet, Take 1 tablet by mouth Daily., Disp: 90 tablet, Rfl: 1    atorvastatin (LIPITOR) 40 MG tablet, Take 1 tablet by mouth Daily., Disp: 90 tablet, Rfl: 0    clopidogrel (PLAVIX) 75 MG tablet, Take 1 tablet by mouth Daily., Disp: 90 tablet, Rfl:  "1    empagliflozin (Jardiance) 25 MG tablet tablet, Take 1 tablet by mouth Daily., Disp: 90 tablet, Rfl: 1    isosorbide mononitrate (IMDUR) 30 MG 24 hr tablet, Take 1 tablet by mouth Daily., Disp: 90 tablet, Rfl: 1    lisinopril (PRINIVIL,ZESTRIL) 2.5 MG tablet, Take 1 tablet by mouth Daily., Disp: 90 tablet, Rfl: 1    metFORMIN ER (GLUCOPHAGE-XR) 500 MG 24 hr tablet, Take 2 tablets by mouth 2 (Two) Times a Day., Disp: 360 tablet, Rfl: 1    metoprolol succinate XL (TOPROL-XL) 25 MG 24 hr tablet, TAKE ONE-HALF TABLET BY MOUTH EVERY DAY, Disp: 30 tablet, Rfl: 3    nitroglycerin (NITROSTAT) 0.4 MG SL tablet, Place 1 tablet under the tongue Every 5 (Five) Minutes As Needed for Chest Pain (Only if SBP Greater Than 100). Take no more than 3 doses in 15 minutes., Disp: 30 tablet, Rfl: 12   Family History   Problem Relation Age of Onset    Cancer Mother     Cancer Father     Heart disease Brother 58        CABG    Cancer Maternal Uncle     Cancer Other     Colon cancer Neg Hx     Malig Hyperthermia Neg Hx           Vital Signs:   Vitals:    07/08/24 1524 07/08/24 1548   BP: 130/90 130/85   Pulse: 84    Temp: 97.9 °F (36.6 °C)    SpO2: 95%    Weight: 94.4 kg (208 lb 3.2 oz)    Height: 171.5 cm (67.5\")        Review of Systems   Constitutional:  Negative for fatigue, fever, malaise/fatigue and weight loss.   HENT:  Negative for sore throat.    Eyes:  Negative for blurred vision and visual disturbance.   Respiratory:  Negative for cough, chest tightness, shortness of breath and wheezing.    Cardiovascular:  Negative for chest pain, palpitations, orthopnea, leg swelling and PND.   Gastrointestinal:  Negative for abdominal pain, diarrhea, nausea and vomiting.   Endocrine: Negative for polydipsia, polyphagia and polyuria.   Musculoskeletal:  Negative for myalgias and neck pain.   Neurological:  Negative for dizziness, focal weakness, weakness, light-headedness and headaches.      Physical Exam  Vitals reviewed.   Constitutional: "       Appearance: Normal appearance. He is well-developed.   HENT:      Head: Normocephalic and atraumatic.      Right Ear: External ear normal.      Left Ear: External ear normal.      Mouth/Throat:      Pharynx: No oropharyngeal exudate.   Eyes:      Conjunctiva/sclera: Conjunctivae normal.      Pupils: Pupils are equal, round, and reactive to light.   Cardiovascular:      Rate and Rhythm: Normal rate and regular rhythm.      Pulses: Normal pulses.      Heart sounds: Normal heart sounds. No murmur heard.     No friction rub. No gallop.   Pulmonary:      Effort: Pulmonary effort is normal.      Breath sounds: Normal breath sounds. No wheezing or rhonchi.   Abdominal:      General: Abdomen is flat. Bowel sounds are normal. There is no distension.      Palpations: Abdomen is soft. There is no mass.      Tenderness: There is no abdominal tenderness. There is no guarding or rebound.      Hernia: No hernia is present.   Musculoskeletal:         General: Normal range of motion.   Skin:     General: Skin is warm and dry.      Capillary Refill: Capillary refill takes less than 2 seconds.   Neurological:      General: No focal deficit present.      Mental Status: He is alert and oriented to person, place, and time.      Cranial Nerves: No cranial nerve deficit.   Psychiatric:         Mood and Affect: Mood and affect normal.         Behavior: Behavior normal.         Thought Content: Thought content normal.         Judgment: Judgment normal.        Result Review :   The following data was reviewed by: Cesar Pruitt MD on 07/08/2024:  CMP          1/6/2024    11:05   CMP   Glucose 153    BUN 17    Creatinine 0.96    EGFR 91.1    Sodium 137    Potassium 4.3    Chloride 103    Calcium 9.2    Total Protein 7.1    Albumin 4.4    Globulin 2.7    Total Bilirubin 0.6    Alkaline Phosphatase 49    AST (SGOT) 27    ALT (SGPT) 26    Albumin/Globulin Ratio 1.6    BUN/Creatinine Ratio 17.7    Anion Gap 9.8      CBC          1/6/2024     11:05   CBC   WBC 5.06    RBC 4.96    Hemoglobin 14.7    Hematocrit 43.9    MCV 88.5    MCH 29.6    MCHC 33.5    RDW 12.1    Platelets 178      Lipid Panel          1/6/2024    11:05   Lipid Panel   Total Cholesterol 164    Triglycerides 131    HDL Cholesterol 46    VLDL Cholesterol 23    LDL Cholesterol  95    LDL/HDL Ratio 2.00      TSH          1/6/2024    11:05   TSH   TSH 1.280      Most Recent A1C          1/6/2024    11:05   HGBA1C Most Recent   Hemoglobin A1C 7.60      Microalbumin          1/6/2024    11:07   Microalbumin   Microalbumin, Urine 1.8                Assessment and Plan    Diagnoses and all orders for this visit:    1. History of stroke  -     aspirin (Aspirin Low Dose) 81 MG EC tablet; Take 1 tablet by mouth Daily.  Dispense: 90 tablet; Refill: 1  -     clopidogrel (PLAVIX) 75 MG tablet; Take 1 tablet by mouth Daily.  Dispense: 90 tablet; Refill: 1    2. Type 2 diabetes mellitus with hyperglycemia, without long-term current use of insulin  -     empagliflozin (Jardiance) 25 MG tablet tablet; Take 1 tablet by mouth Daily.  Dispense: 90 tablet; Refill: 1  -     metFORMIN ER (GLUCOPHAGE-XR) 500 MG 24 hr tablet; Take 2 tablets by mouth 2 (Two) Times a Day.  Dispense: 360 tablet; Refill: 1  -     CBC Auto Differential  -     Comprehensive Metabolic Panel  -     Hemoglobin A1c  -     Lipid Panel  -     MicroAlbumin, Urine, Random - Urine, Clean Catch    3. Primary hypertension  -     isosorbide mononitrate (IMDUR) 30 MG 24 hr tablet; Take 1 tablet by mouth Daily.  Dispense: 90 tablet; Refill: 1  -     lisinopril (PRINIVIL,ZESTRIL) 2.5 MG tablet; Take 1 tablet by mouth Daily.  Dispense: 90 tablet; Refill: 1  -     TSH+Free T4            Follow Up   Return in about 6 months (around 1/8/2025) for Recheck.  Patient was given instructions and counseling regarding his condition or for health maintenance advice. Please see specific information pulled into the AVS if appropriate.

## 2024-08-10 ENCOUNTER — CLINICAL SUPPORT (OUTPATIENT)
Dept: FAMILY MEDICINE CLINIC | Facility: CLINIC | Age: 59
End: 2024-08-10
Payer: COMMERCIAL

## 2024-08-10 DIAGNOSIS — R07.9 CHEST PAIN, UNSPECIFIED TYPE: Primary | ICD-10-CM

## 2024-08-10 DIAGNOSIS — E66.9 CLASS 1 OBESITY: Chronic | ICD-10-CM

## 2024-08-10 DIAGNOSIS — I10 ESSENTIAL HYPERTENSION: Chronic | ICD-10-CM

## 2024-08-10 LAB
ALBUMIN SERPL-MCNC: 4.5 G/DL (ref 3.5–5.2)
ALBUMIN UR-MCNC: 2.6 MG/DL
ALBUMIN/GLOB SERPL: 1.7 G/DL
ALP SERPL-CCNC: 56 U/L (ref 39–117)
ALT SERPL W P-5'-P-CCNC: 22 U/L (ref 1–41)
ANION GAP SERPL CALCULATED.3IONS-SCNC: 10 MMOL/L (ref 5–15)
AST SERPL-CCNC: 24 U/L (ref 1–40)
BASOPHILS # BLD AUTO: 0.03 10*3/MM3 (ref 0–0.2)
BASOPHILS NFR BLD AUTO: 0.6 % (ref 0–1.5)
BILIRUB SERPL-MCNC: 0.5 MG/DL (ref 0–1.2)
BUN SERPL-MCNC: 15 MG/DL (ref 8–23)
BUN/CREAT SERPL: 15 (ref 7–25)
CALCIUM SPEC-SCNC: 9.2 MG/DL (ref 8.6–10.5)
CHLORIDE SERPL-SCNC: 102 MMOL/L (ref 98–107)
CHOLEST SERPL-MCNC: 145 MG/DL (ref 0–200)
CO2 SERPL-SCNC: 24 MMOL/L (ref 22–29)
CREAT SERPL-MCNC: 1 MG/DL (ref 0.76–1.27)
DEPRECATED RDW RBC AUTO: 42.3 FL (ref 37–54)
EGFRCR SERPLBLD CKD-EPI 2021: 86.2 ML/MIN/1.73
EOSINOPHIL # BLD AUTO: 0.2 10*3/MM3 (ref 0–0.4)
EOSINOPHIL NFR BLD AUTO: 3.8 % (ref 0.3–6.2)
ERYTHROCYTE [DISTWIDTH] IN BLOOD BY AUTOMATED COUNT: 12.7 % (ref 12.3–15.4)
GLOBULIN UR ELPH-MCNC: 2.6 GM/DL
GLUCOSE SERPL-MCNC: 161 MG/DL (ref 65–99)
HBA1C MFR BLD: 7.8 % (ref 4.8–5.6)
HCT VFR BLD AUTO: 42.2 % (ref 37.5–51)
HDLC SERPL-MCNC: 42 MG/DL (ref 40–60)
HGB BLD-MCNC: 14.6 G/DL (ref 13–17.7)
IMM GRANULOCYTES # BLD AUTO: 0.01 10*3/MM3 (ref 0–0.05)
IMM GRANULOCYTES NFR BLD AUTO: 0.2 % (ref 0–0.5)
LDLC SERPL CALC-MCNC: 71 MG/DL (ref 0–100)
LDLC/HDLC SERPL: 1.54 {RATIO}
LYMPHOCYTES # BLD AUTO: 1.99 10*3/MM3 (ref 0.7–3.1)
LYMPHOCYTES NFR BLD AUTO: 37.8 % (ref 19.6–45.3)
MCH RBC QN AUTO: 31.8 PG (ref 26.6–33)
MCHC RBC AUTO-ENTMCNC: 34.6 G/DL (ref 31.5–35.7)
MCV RBC AUTO: 91.9 FL (ref 79–97)
MONOCYTES # BLD AUTO: 0.51 10*3/MM3 (ref 0.1–0.9)
MONOCYTES NFR BLD AUTO: 9.7 % (ref 5–12)
NEUTROPHILS NFR BLD AUTO: 2.53 10*3/MM3 (ref 1.7–7)
NEUTROPHILS NFR BLD AUTO: 47.9 % (ref 42.7–76)
NRBC BLD AUTO-RTO: 0 /100 WBC (ref 0–0.2)
PLATELET # BLD AUTO: 154 10*3/MM3 (ref 140–450)
PMV BLD AUTO: 10.3 FL (ref 6–12)
POTASSIUM SERPL-SCNC: 4.2 MMOL/L (ref 3.5–5.2)
PROT SERPL-MCNC: 7.1 G/DL (ref 6–8.5)
RBC # BLD AUTO: 4.59 10*6/MM3 (ref 4.14–5.8)
SODIUM SERPL-SCNC: 136 MMOL/L (ref 136–145)
T4 FREE SERPL-MCNC: 1.34 NG/DL (ref 0.92–1.68)
TRIGL SERPL-MCNC: 192 MG/DL (ref 0–150)
TSH SERPL DL<=0.05 MIU/L-ACNC: 2.23 UIU/ML (ref 0.27–4.2)
VLDLC SERPL-MCNC: 32 MG/DL (ref 5–40)
WBC NRBC COR # BLD AUTO: 5.27 10*3/MM3 (ref 3.4–10.8)

## 2024-08-10 PROCEDURE — 83036 HEMOGLOBIN GLYCOSYLATED A1C: CPT | Performed by: FAMILY MEDICINE

## 2024-08-10 PROCEDURE — 80050 GENERAL HEALTH PANEL: CPT | Performed by: FAMILY MEDICINE

## 2024-08-10 PROCEDURE — 82043 UR ALBUMIN QUANTITATIVE: CPT | Performed by: FAMILY MEDICINE

## 2024-08-10 PROCEDURE — 36415 COLL VENOUS BLD VENIPUNCTURE: CPT | Performed by: FAMILY MEDICINE

## 2024-08-10 PROCEDURE — 80061 LIPID PANEL: CPT | Performed by: FAMILY MEDICINE

## 2024-08-10 PROCEDURE — 84439 ASSAY OF FREE THYROXINE: CPT | Performed by: FAMILY MEDICINE

## 2024-08-10 NOTE — PROGRESS NOTES
Venipuncture Blood Specimen Collection  Venipuncture performed in left arm by Mary Knight with good hemostasis. Patient tolerated the procedure well without complications.   08/10/24   Mary Knight

## 2024-10-03 DIAGNOSIS — I10 PRIMARY HYPERTENSION: ICD-10-CM

## 2024-10-03 RX ORDER — ATORVASTATIN CALCIUM 40 MG/1
40 TABLET, FILM COATED ORAL DAILY
Qty: 90 TABLET | Refills: 0 | Status: SHIPPED | OUTPATIENT
Start: 2024-10-03

## 2024-10-03 RX ORDER — METOPROLOL SUCCINATE 25 MG/1
TABLET, EXTENDED RELEASE ORAL
Qty: 30 TABLET | Refills: 3 | OUTPATIENT
Start: 2024-10-03

## 2024-12-31 RX ORDER — ATORVASTATIN CALCIUM 40 MG/1
40 TABLET, FILM COATED ORAL DAILY
Qty: 90 TABLET | Refills: 0 | Status: SHIPPED | OUTPATIENT
Start: 2024-12-31

## 2025-01-09 ENCOUNTER — OFFICE VISIT (OUTPATIENT)
Dept: FAMILY MEDICINE CLINIC | Facility: CLINIC | Age: 60
End: 2025-01-09
Payer: COMMERCIAL

## 2025-01-09 VITALS
DIASTOLIC BLOOD PRESSURE: 62 MMHG | OXYGEN SATURATION: 96 % | HEART RATE: 90 BPM | SYSTOLIC BLOOD PRESSURE: 118 MMHG | TEMPERATURE: 98.6 F | BODY MASS INDEX: 31.85 KG/M2 | WEIGHT: 206.4 LBS

## 2025-01-09 DIAGNOSIS — L57.0 ACTINIC KERATOSIS OF SCALP: ICD-10-CM

## 2025-01-09 DIAGNOSIS — E78.2 MIXED HYPERLIPIDEMIA: ICD-10-CM

## 2025-01-09 DIAGNOSIS — I10 PRIMARY HYPERTENSION: ICD-10-CM

## 2025-01-09 DIAGNOSIS — E11.65 TYPE 2 DIABETES MELLITUS WITH HYPERGLYCEMIA, UNSPECIFIED WHETHER LONG TERM INSULIN USE: ICD-10-CM

## 2025-01-09 DIAGNOSIS — I10 ESSENTIAL HYPERTENSION: Primary | ICD-10-CM

## 2025-01-09 DIAGNOSIS — Z86.73 HISTORY OF STROKE: ICD-10-CM

## 2025-01-09 DIAGNOSIS — E11.65 TYPE 2 DIABETES MELLITUS WITH HYPERGLYCEMIA, WITHOUT LONG-TERM CURRENT USE OF INSULIN: ICD-10-CM

## 2025-01-09 RX ORDER — ASPIRIN 81 MG/1
81 TABLET ORAL DAILY
Qty: 90 TABLET | Refills: 1 | Status: SHIPPED | OUTPATIENT
Start: 2025-01-09

## 2025-01-09 RX ORDER — NITROGLYCERIN 0.4 MG/1
0.4 TABLET SUBLINGUAL
Qty: 30 TABLET | Refills: 12 | Status: SHIPPED | OUTPATIENT
Start: 2025-01-09

## 2025-01-09 RX ORDER — LISINOPRIL 2.5 MG/1
2.5 TABLET ORAL DAILY
Qty: 90 TABLET | Refills: 1 | Status: SHIPPED | OUTPATIENT
Start: 2025-01-09

## 2025-01-09 RX ORDER — ISOSORBIDE MONONITRATE 30 MG/1
30 TABLET, EXTENDED RELEASE ORAL DAILY
Qty: 90 TABLET | Refills: 1 | Status: SHIPPED | OUTPATIENT
Start: 2025-01-09

## 2025-01-09 RX ORDER — ATORVASTATIN CALCIUM 40 MG/1
40 TABLET, FILM COATED ORAL DAILY
Qty: 90 TABLET | Refills: 0 | Status: SHIPPED | OUTPATIENT
Start: 2025-01-09

## 2025-01-09 RX ORDER — METOPROLOL SUCCINATE 25 MG/1
12.5 TABLET, EXTENDED RELEASE ORAL DAILY
Qty: 30 TABLET | Refills: 3 | Status: SHIPPED | OUTPATIENT
Start: 2025-01-09

## 2025-01-09 RX ORDER — METFORMIN HYDROCHLORIDE 500 MG/1
1000 TABLET, EXTENDED RELEASE ORAL 2 TIMES DAILY
Qty: 360 TABLET | Refills: 1 | Status: SHIPPED | OUTPATIENT
Start: 2025-01-09

## 2025-01-09 RX ORDER — CLOPIDOGREL BISULFATE 75 MG/1
75 TABLET ORAL DAILY
Qty: 90 TABLET | Refills: 1 | Status: SHIPPED | OUTPATIENT
Start: 2025-01-09

## 2025-01-10 NOTE — PROGRESS NOTES
Chief Complaint  Hypertension, Diabetes, and Hyperlipidemia    Subjective      Glenroy Penaloza is a 59 y.o. male who presents to [facility] for follow up on chronic conditions. He has no complaints today, but does make note of a lesion on the top of his head that his wife wants him to get checked out.     HTN - blood pressure is controlled on current regimen, lisinopril, metoprolol succ, and Imdur.      HLD - continues on lipitor 40mg, most recent lipid panel reviewed    DM2 - continues on jardiance and metformin    No chest pain, headaches, SOB, peripheral edema, dizziness, diaphoresis, episodes of hypoglycemia. No issues with medication adherence.     He tells me this place on the top of his head has been there for a while. It does not itch or burn or scab/bleed.     He needs refills on all of his medications.     Objective   Vital Signs:   Vitals:    01/09/25 1530   BP: 118/62   Pulse: 90   Temp: 98.6 °F (37 °C)   SpO2: 96%   Weight: 93.6 kg (206 lb 6.4 oz)     Body mass index is 31.85 kg/m².    Wt Readings from Last 3 Encounters:   01/09/25 93.6 kg (206 lb 6.4 oz)   07/08/24 94.4 kg (208 lb 3.2 oz)   01/02/24 93 kg (205 lb)     BP Readings from Last 3 Encounters:   01/09/25 118/62   07/08/24 130/85   01/02/24 130/80       Health Maintenance   Topic Date Due    Pneumococcal Vaccine 0-64 (1 of 2 - PCV) Never done    ZOSTER VACCINE (1 of 2) Never done    DIABETIC EYE EXAM  07/09/2020    COVID-19 Vaccine (2 - 2024-25 season) 09/01/2024    DIABETIC FOOT EXAM  01/02/2025    BMI FOLLOWUP  01/02/2025    HEMOGLOBIN A1C  02/10/2025    INFLUENZA VACCINE  03/31/2025 (Originally 7/1/2024)    ANNUAL PHYSICAL  07/08/2025    LIPID PANEL  08/10/2025    COLORECTAL CANCER SCREENING  04/04/2027    TDAP/TD VACCINES (2 - Td or Tdap) 01/02/2034    HEPATITIS C SCREENING  Completed    URINE MICROALBUMIN  Discontinued       Physical Exam  HENT:      Head: Normocephalic and atraumatic.      Right Ear:  External ear normal.      Left Ear: External ear normal.      Mouth/Throat:      Mouth: Mucous membranes are moist.   Eyes:      Conjunctiva/sclera: Conjunctivae normal.   Cardiovascular:      Rate and Rhythm: Normal rate and regular rhythm.      Heart sounds: Normal heart sounds.   Pulmonary:      Effort: Pulmonary effort is normal.      Breath sounds: Normal breath sounds.   Abdominal:      General: Abdomen is flat.      Palpations: Abdomen is soft.   Musculoskeletal:         General: Normal range of motion.      Cervical back: Normal range of motion and neck supple.   Skin:     General: Skin is warm and dry.   Neurological:      General: No focal deficit present.      Mental Status: He is alert.   Psychiatric:         Behavior: Behavior normal.          Result Review :  The following data was reviewed by: Georgina Lynch DO on 01/09/2025:         Cryotherapy, Skin Lesion    Date/Time: 1/10/2025 9:34 AM    Performed by: Georgina Lynch DO  Authorized by: Georgina Lynch DO  Local anesthesia used: no    Anesthesia:  Local anesthesia used: no    Sedation:  Patient sedated: no    Patient tolerance: patient tolerated the procedure well with no immediate complications  Comments: Liquid nitrogen used to freeze 1x AK on top of the scalp, three 10 second freeze/thaw cycles                ASSESSMENT/PLAN  Diagnoses and all orders for this visit:    1. Essential hypertension (Primary)  -     Comprehensive Metabolic Panel; Future  -     CBC Auto Differential; Future  -     Magnesium; Future  -     Lipid Panel With / Chol / HDL Ratio; Future  -     Hemoglobin A1c; Future    2. Mixed hyperlipidemia  -     Comprehensive Metabolic Panel; Future  -     CBC Auto Differential; Future  -     Magnesium; Future  -     Lipid Panel With / Chol / HDL Ratio; Future  -     Hemoglobin A1c; Future  -     atorvastatin (LIPITOR) 40 MG tablet; Take 1 tablet by mouth Daily.  Dispense: 90 tablet; Refill: 0    3. Type 2 diabetes  mellitus with hyperglycemia, unspecified whether long term insulin use  -     Comprehensive Metabolic Panel; Future  -     CBC Auto Differential; Future  -     Magnesium; Future  -     Lipid Panel With / Chol / HDL Ratio; Future  -     Hemoglobin A1c; Future  -     atorvastatin (LIPITOR) 40 MG tablet; Take 1 tablet by mouth Daily.  Dispense: 90 tablet; Refill: 0    4. History of stroke  -     aspirin (Aspirin Low Dose) 81 MG EC tablet; Take 1 tablet by mouth Daily.  Dispense: 90 tablet; Refill: 1  -     clopidogrel (PLAVIX) 75 MG tablet; Take 1 tablet by mouth Daily.  Dispense: 90 tablet; Refill: 1    5. Primary hypertension  -     isosorbide mononitrate (IMDUR) 30 MG 24 hr tablet; Take 1 tablet by mouth Daily.  Dispense: 90 tablet; Refill: 1  -     lisinopril (PRINIVIL,ZESTRIL) 2.5 MG tablet; Take 1 tablet by mouth Daily.  Dispense: 90 tablet; Refill: 1  -     metoprolol succinate XL (TOPROL-XL) 25 MG 24 hr tablet; Take 0.5 tablets by mouth Daily.  Dispense: 30 tablet; Refill: 3    6. Type 2 diabetes mellitus with hyperglycemia, without long-term current use of insulin  -     empagliflozin (Jardiance) 25 MG tablet tablet; Take 1 tablet by mouth Daily.  Dispense: 90 tablet; Refill: 1  -     metFORMIN ER (GLUCOPHAGE-XR) 500 MG 24 hr tablet; Take 2 tablets by mouth 2 (Two) Times a Day.  Dispense: 360 tablet; Refill: 1    7. Actinic keratosis of scalp    Other orders  -     nitroglycerin (NITROSTAT) 0.4 MG SL tablet; Place 1 tablet under the tongue Every 5 (Five) Minutes As Needed for Chest Pain (Only if SBP Greater Than 100). Take no more than 3 doses in 15 minutes.  Dispense: 30 tablet; Refill: 12        CBC, CMP, Mg, lipid panel A1c ordered today. Orders placed as future as he is not fasting today. All medications refilled.     AK frozen with liquid nitro with three 10 second freeze thaw cycles.              FOLLOW UP  No follow-ups on file.  Patient was given instructions and counseling regarding his condition or  for health maintenance advice. Please see specific information pulled into the AVS if appropriate.       Georgina Lynch DO  01/10/25  09:34 EST

## 2025-01-18 ENCOUNTER — CLINICAL SUPPORT (OUTPATIENT)
Dept: FAMILY MEDICINE CLINIC | Facility: CLINIC | Age: 60
End: 2025-01-18
Payer: COMMERCIAL

## 2025-01-18 DIAGNOSIS — E11.65 TYPE 2 DIABETES MELLITUS WITH HYPERGLYCEMIA, UNSPECIFIED WHETHER LONG TERM INSULIN USE: ICD-10-CM

## 2025-01-18 DIAGNOSIS — I10 ESSENTIAL HYPERTENSION: ICD-10-CM

## 2025-01-18 DIAGNOSIS — E78.2 MIXED HYPERLIPIDEMIA: ICD-10-CM

## 2025-01-18 LAB
ALBUMIN SERPL-MCNC: 4.2 G/DL (ref 3.5–5.2)
ALBUMIN/GLOB SERPL: 1.6 G/DL
ALP SERPL-CCNC: 46 U/L (ref 39–117)
ALT SERPL W P-5'-P-CCNC: 24 U/L (ref 1–41)
ANION GAP SERPL CALCULATED.3IONS-SCNC: 11 MMOL/L (ref 5–15)
AST SERPL-CCNC: 23 U/L (ref 1–40)
BASOPHILS # BLD AUTO: 0.03 10*3/MM3 (ref 0–0.2)
BASOPHILS NFR BLD AUTO: 0.5 % (ref 0–1.5)
BILIRUB SERPL-MCNC: 0.6 MG/DL (ref 0–1.2)
BUN SERPL-MCNC: 15 MG/DL (ref 8–23)
BUN/CREAT SERPL: 13.9 (ref 7–25)
CALCIUM SPEC-SCNC: 9.1 MG/DL (ref 8.6–10.5)
CHLORIDE SERPL-SCNC: 103 MMOL/L (ref 98–107)
CHOLEST SERPL-MCNC: 147 MG/DL (ref 0–200)
CO2 SERPL-SCNC: 23 MMOL/L (ref 22–29)
CREAT SERPL-MCNC: 1.08 MG/DL (ref 0.76–1.27)
DEPRECATED RDW RBC AUTO: 39.9 FL (ref 37–54)
EGFRCR SERPLBLD CKD-EPI 2021: 78.6 ML/MIN/1.73
EOSINOPHIL # BLD AUTO: 0.19 10*3/MM3 (ref 0–0.4)
EOSINOPHIL NFR BLD AUTO: 3.3 % (ref 0.3–6.2)
ERYTHROCYTE [DISTWIDTH] IN BLOOD BY AUTOMATED COUNT: 12.2 % (ref 12.3–15.4)
GLOBULIN UR ELPH-MCNC: 2.7 GM/DL
GLUCOSE SERPL-MCNC: 184 MG/DL (ref 65–99)
HBA1C MFR BLD: 8.5 % (ref 4.8–5.6)
HCT VFR BLD AUTO: 41.7 % (ref 37.5–51)
HDLC SERPL QL: 3.13
HDLC SERPL-MCNC: 47 MG/DL (ref 40–60)
HGB BLD-MCNC: 14.7 G/DL (ref 13–17.7)
IMM GRANULOCYTES # BLD AUTO: 0.02 10*3/MM3 (ref 0–0.05)
IMM GRANULOCYTES NFR BLD AUTO: 0.3 % (ref 0–0.5)
LDLC SERPL CALC-MCNC: 76 MG/DL (ref 0–100)
LYMPHOCYTES # BLD AUTO: 1.63 10*3/MM3 (ref 0.7–3.1)
LYMPHOCYTES NFR BLD AUTO: 28.3 % (ref 19.6–45.3)
MAGNESIUM SERPL-MCNC: 2.2 MG/DL (ref 1.6–2.4)
MCH RBC QN AUTO: 31.7 PG (ref 26.6–33)
MCHC RBC AUTO-ENTMCNC: 35.3 G/DL (ref 31.5–35.7)
MCV RBC AUTO: 89.9 FL (ref 79–97)
MONOCYTES # BLD AUTO: 0.55 10*3/MM3 (ref 0.1–0.9)
MONOCYTES NFR BLD AUTO: 9.5 % (ref 5–12)
NEUTROPHILS NFR BLD AUTO: 3.34 10*3/MM3 (ref 1.7–7)
NEUTROPHILS NFR BLD AUTO: 58.1 % (ref 42.7–76)
NRBC BLD AUTO-RTO: 0 /100 WBC (ref 0–0.2)
PLATELET # BLD AUTO: 177 10*3/MM3 (ref 140–450)
PMV BLD AUTO: 10.5 FL (ref 6–12)
POTASSIUM SERPL-SCNC: 4.1 MMOL/L (ref 3.5–5.2)
PROT SERPL-MCNC: 6.9 G/DL (ref 6–8.5)
RBC # BLD AUTO: 4.64 10*6/MM3 (ref 4.14–5.8)
SODIUM SERPL-SCNC: 137 MMOL/L (ref 136–145)
TRIGL SERPL-MCNC: 135 MG/DL (ref 0–150)
VLDLC SERPL-MCNC: 24 MG/DL (ref 5–40)
WBC NRBC COR # BLD AUTO: 5.76 10*3/MM3 (ref 3.4–10.8)

## 2025-01-18 PROCEDURE — 85025 COMPLETE CBC W/AUTO DIFF WBC: CPT | Performed by: STUDENT IN AN ORGANIZED HEALTH CARE EDUCATION/TRAINING PROGRAM

## 2025-01-18 PROCEDURE — 83036 HEMOGLOBIN GLYCOSYLATED A1C: CPT | Performed by: STUDENT IN AN ORGANIZED HEALTH CARE EDUCATION/TRAINING PROGRAM

## 2025-01-18 PROCEDURE — 83735 ASSAY OF MAGNESIUM: CPT | Performed by: STUDENT IN AN ORGANIZED HEALTH CARE EDUCATION/TRAINING PROGRAM

## 2025-01-18 PROCEDURE — 80053 COMPREHEN METABOLIC PANEL: CPT | Performed by: STUDENT IN AN ORGANIZED HEALTH CARE EDUCATION/TRAINING PROGRAM

## 2025-01-18 PROCEDURE — 36415 COLL VENOUS BLD VENIPUNCTURE: CPT | Performed by: FAMILY MEDICINE

## 2025-01-18 PROCEDURE — 80061 LIPID PANEL: CPT | Performed by: STUDENT IN AN ORGANIZED HEALTH CARE EDUCATION/TRAINING PROGRAM

## 2025-01-18 NOTE — PROGRESS NOTES
..  Venipuncture Blood Specimen Collection  Venipuncture performed in left arm by Micaela Lizarraga with good hemostasis. Patient tolerated the procedure well without complications.   01/18/25   Micaela Lizarraga

## 2025-06-30 DIAGNOSIS — E78.2 MIXED HYPERLIPIDEMIA: ICD-10-CM

## 2025-06-30 DIAGNOSIS — E11.65 TYPE 2 DIABETES MELLITUS WITH HYPERGLYCEMIA, UNSPECIFIED WHETHER LONG TERM INSULIN USE: ICD-10-CM

## 2025-06-30 RX ORDER — ATORVASTATIN CALCIUM 40 MG/1
40 TABLET, FILM COATED ORAL DAILY
Qty: 90 TABLET | Refills: 0 | Status: SHIPPED | OUTPATIENT
Start: 2025-06-30

## 2025-07-10 ENCOUNTER — OFFICE VISIT (OUTPATIENT)
Dept: FAMILY MEDICINE CLINIC | Facility: CLINIC | Age: 60
End: 2025-07-10
Payer: COMMERCIAL

## 2025-07-10 VITALS
DIASTOLIC BLOOD PRESSURE: 70 MMHG | OXYGEN SATURATION: 95 % | SYSTOLIC BLOOD PRESSURE: 128 MMHG | BODY MASS INDEX: 31.14 KG/M2 | HEIGHT: 68 IN | TEMPERATURE: 99.3 F | WEIGHT: 205.5 LBS | HEART RATE: 103 BPM

## 2025-07-10 DIAGNOSIS — E78.2 MIXED HYPERLIPIDEMIA: ICD-10-CM

## 2025-07-10 DIAGNOSIS — Z12.5 SCREENING PSA (PROSTATE SPECIFIC ANTIGEN): Primary | ICD-10-CM

## 2025-07-10 DIAGNOSIS — Z86.73 HISTORY OF STROKE: ICD-10-CM

## 2025-07-10 DIAGNOSIS — E11.65 TYPE 2 DIABETES MELLITUS WITH HYPERGLYCEMIA, WITHOUT LONG-TERM CURRENT USE OF INSULIN: ICD-10-CM

## 2025-07-10 DIAGNOSIS — I10 PRIMARY HYPERTENSION: ICD-10-CM

## 2025-07-10 LAB
ALBUMIN SERPL-MCNC: 4.5 G/DL (ref 3.5–5.2)
ALBUMIN UR-MCNC: 2 MG/DL
ALBUMIN/GLOB SERPL: 1.5 G/DL
ALP SERPL-CCNC: 51 U/L (ref 39–117)
ALT SERPL W P-5'-P-CCNC: 29 U/L (ref 1–41)
ANION GAP SERPL CALCULATED.3IONS-SCNC: 13 MMOL/L (ref 5–15)
AST SERPL-CCNC: 29 U/L (ref 1–40)
BASOPHILS # BLD AUTO: 0.03 10*3/MM3 (ref 0–0.2)
BASOPHILS NFR BLD AUTO: 0.6 % (ref 0–1.5)
BILIRUB SERPL-MCNC: 0.2 MG/DL (ref 0–1.2)
BUN SERPL-MCNC: 19 MG/DL (ref 8–23)
BUN/CREAT SERPL: 18.4 (ref 7–25)
CALCIUM SPEC-SCNC: 9.4 MG/DL (ref 8.6–10.5)
CHLORIDE SERPL-SCNC: 102 MMOL/L (ref 98–107)
CHOLEST SERPL-MCNC: 158 MG/DL (ref 0–200)
CO2 SERPL-SCNC: 23 MMOL/L (ref 22–29)
CREAT SERPL-MCNC: 1.03 MG/DL (ref 0.76–1.27)
CREAT UR-MCNC: 56.2 MG/DL
DEPRECATED RDW RBC AUTO: 40.8 FL (ref 37–54)
EGFRCR SERPLBLD CKD-EPI 2021: 82.6 ML/MIN/1.73
EOSINOPHIL # BLD AUTO: 0.15 10*3/MM3 (ref 0–0.4)
EOSINOPHIL NFR BLD AUTO: 2.9 % (ref 0.3–6.2)
ERYTHROCYTE [DISTWIDTH] IN BLOOD BY AUTOMATED COUNT: 12.3 % (ref 12.3–15.4)
GLOBULIN UR ELPH-MCNC: 3.1 GM/DL
GLUCOSE SERPL-MCNC: 235 MG/DL (ref 65–99)
HBA1C MFR BLD: 8 % (ref 4.8–5.6)
HCT VFR BLD AUTO: 40.6 % (ref 37.5–51)
HDLC SERPL-MCNC: 42 MG/DL (ref 40–60)
HGB BLD-MCNC: 14.3 G/DL (ref 13–17.7)
IMM GRANULOCYTES # BLD AUTO: 0.02 10*3/MM3 (ref 0–0.05)
IMM GRANULOCYTES NFR BLD AUTO: 0.4 % (ref 0–0.5)
LDLC SERPL CALC-MCNC: 67 MG/DL (ref 0–100)
LDLC/HDLC SERPL: 1.3 {RATIO}
LYMPHOCYTES # BLD AUTO: 1.66 10*3/MM3 (ref 0.7–3.1)
LYMPHOCYTES NFR BLD AUTO: 31.6 % (ref 19.6–45.3)
MCH RBC QN AUTO: 32.4 PG (ref 26.6–33)
MCHC RBC AUTO-ENTMCNC: 35.2 G/DL (ref 31.5–35.7)
MCV RBC AUTO: 91.9 FL (ref 79–97)
MICROALBUMIN/CREAT UR: 35.6 MG/G (ref 0–29)
MONOCYTES # BLD AUTO: 0.51 10*3/MM3 (ref 0.1–0.9)
MONOCYTES NFR BLD AUTO: 9.7 % (ref 5–12)
NEUTROPHILS NFR BLD AUTO: 2.88 10*3/MM3 (ref 1.7–7)
NEUTROPHILS NFR BLD AUTO: 54.8 % (ref 42.7–76)
NRBC BLD AUTO-RTO: 0 /100 WBC (ref 0–0.2)
PLATELET # BLD AUTO: 161 10*3/MM3 (ref 140–450)
PMV BLD AUTO: 10.4 FL (ref 6–12)
POTASSIUM SERPL-SCNC: 4.3 MMOL/L (ref 3.5–5.2)
PROT SERPL-MCNC: 7.6 G/DL (ref 6–8.5)
PSA SERPL-MCNC: 0.6 NG/ML (ref 0–4)
RBC # BLD AUTO: 4.42 10*6/MM3 (ref 4.14–5.8)
SODIUM SERPL-SCNC: 138 MMOL/L (ref 136–145)
T4 FREE SERPL-MCNC: 1.12 NG/DL (ref 0.92–1.68)
TRIGL SERPL-MCNC: 306 MG/DL (ref 0–150)
TSH SERPL DL<=0.05 MIU/L-ACNC: 1.65 UIU/ML (ref 0.27–4.2)
VLDLC SERPL-MCNC: 49 MG/DL (ref 5–40)
WBC NRBC COR # BLD AUTO: 5.25 10*3/MM3 (ref 3.4–10.8)

## 2025-07-10 PROCEDURE — 80061 LIPID PANEL: CPT | Performed by: FAMILY MEDICINE

## 2025-07-10 PROCEDURE — G0103 PSA SCREENING: HCPCS | Performed by: FAMILY MEDICINE

## 2025-07-10 PROCEDURE — 82043 UR ALBUMIN QUANTITATIVE: CPT | Performed by: FAMILY MEDICINE

## 2025-07-10 PROCEDURE — 80050 GENERAL HEALTH PANEL: CPT | Performed by: FAMILY MEDICINE

## 2025-07-10 PROCEDURE — 83036 HEMOGLOBIN GLYCOSYLATED A1C: CPT | Performed by: FAMILY MEDICINE

## 2025-07-10 PROCEDURE — 84439 ASSAY OF FREE THYROXINE: CPT | Performed by: FAMILY MEDICINE

## 2025-07-10 PROCEDURE — 82570 ASSAY OF URINE CREATININE: CPT | Performed by: FAMILY MEDICINE

## 2025-07-10 RX ORDER — METOPROLOL SUCCINATE 25 MG/1
12.5 TABLET, EXTENDED RELEASE ORAL DAILY
Qty: 45 TABLET | Refills: 1 | Status: SHIPPED | OUTPATIENT
Start: 2025-07-10

## 2025-07-10 RX ORDER — METFORMIN HYDROCHLORIDE 500 MG/1
1000 TABLET, EXTENDED RELEASE ORAL 2 TIMES DAILY
Qty: 360 TABLET | Refills: 1 | Status: SHIPPED | OUTPATIENT
Start: 2025-07-10

## 2025-07-10 RX ORDER — LISINOPRIL 2.5 MG/1
2.5 TABLET ORAL DAILY
Qty: 90 TABLET | Refills: 1 | Status: SHIPPED | OUTPATIENT
Start: 2025-07-10

## 2025-07-10 RX ORDER — ISOSORBIDE MONONITRATE 30 MG/1
30 TABLET, EXTENDED RELEASE ORAL DAILY
Qty: 90 TABLET | Refills: 1 | Status: SHIPPED | OUTPATIENT
Start: 2025-07-10

## 2025-07-10 RX ORDER — CLOPIDOGREL BISULFATE 75 MG/1
75 TABLET ORAL DAILY
Qty: 90 TABLET | Refills: 1 | Status: SHIPPED | OUTPATIENT
Start: 2025-07-10

## 2025-07-10 RX ORDER — ASPIRIN 81 MG/1
81 TABLET ORAL DAILY
Qty: 90 TABLET | Refills: 1 | Status: SHIPPED | OUTPATIENT
Start: 2025-07-10

## 2025-07-10 RX ORDER — ATORVASTATIN CALCIUM 40 MG/1
40 TABLET, FILM COATED ORAL DAILY
Qty: 90 TABLET | Refills: 1 | Status: SHIPPED | OUTPATIENT
Start: 2025-07-10

## 2025-07-10 NOTE — PROGRESS NOTES
Chief Complaint  Hyperlipidemia, Hypertension, and Diabetes (Pt presents for medication refills today)    Subjective          Glenroy Penaloza presents to [facility]  Hypertension  Chronicity:  Chronic  Onset:  More than 1 year ago  Progression since onset:  Improved  Condition status:  Controlled  Associated symptoms: no anxiety, no blurred vision, no chest pain, no headaches, no malaise/fatigue, no neck pain, no orthopnea, no palpitations, no peripheral edema, no PND, no shortness of breath, no sweats, no dyspnea with exertion and no dizziness    Agents associated with hypertension:  No associated agents  CAD risks:  Diabetes mellitus, dyslipidemia, family history and male gender  Current therapy:  ACE inhibitors and beta blockers  Improvement on treatment:  Significant  Compliance problems:  No compliance problems  Hyperlipidemia  This is a chronic problem. The current episode started more than 1 year ago. The problem is controlled. Recent lipid tests were reviewed and are variable. There are no known factors aggravating his hyperlipidemia. Pertinent negatives include no chest pain, focal sensory loss, focal weakness, leg pain, myalgias or shortness of breath. Current antihyperlipidemic treatment includes statins. The current treatment provides significant improvement of lipids. There are no compliance problems.    Diabetes  Visit type:  Follow-up  Diabetes type:  Type 2  Disease course:  Stable  Associated symptoms:     no blurred vision, no chest pain, no fatigue, no foot paresthesias, no foot ulcerations, no polydipsia, no polyphagia, no polyuria, no visual change, no weakness and no weight loss    Symptom course:  Stable  Hypoglycemia symptoms:     no dizziness, no headaches and no sweats    CAD risks:  Diabetes mellitus, dyslipidemia, family history, hypertension and male sex  Current treatments:  Oral agent (dual therapy)  Treatment compliance:  All of the time  Current diet:   Generally healthy  Exercise:  Intermittently  ACE-I / ARB:  Is being taken  Eye exam current: yes    Sees podiatrist: yes               Objective   No Known Allergies  Immunization History   Administered Date(s) Administered    COVID-19 (NISSA) 07/18/2021    Tdap 01/02/2024     Past Medical History:   Diagnosis Date    Atypical rash     Diabetes mellitus, type 2     Disorder of male genital organs     Elevated glucose     Essential hypertension 07/30/2020    Glucosuria     Hyperlipidemia     LANDRY on CPAP 06/15/2022    Stroke 6/9/22    TIA (transient ischemic attack)       Past Surgical History:   Procedure Laterality Date    CARDIAC CATHETERIZATION N/A 1/15/2023    Procedure: Left Heart Cath;  Surgeon: Arsen Sweet MD;  Location: [facility] CATH INVASIVE LOCATION;  Service: Cardiovascular;  Laterality: N/A;    COLONOSCOPY N/A 04/04/2022    Procedure: COLONOSCOPY WITH POLYPECTOMY;  Surgeon: Arsen Payan MD;  Location: [facility] ENDOSCOPY;  Service: Gastroenterology;  Laterality: N/A;  COLON POLYP, HEMORRHOIDS      Social History     Socioeconomic History    Marital status:    Tobacco Use    Smoking status: Never     Passive exposure: Never    Smokeless tobacco: Former     Quit date: 8/1/2021    Tobacco comments:     Smokeless tobacco 40 years until 8/1/21   Vaping Use    Vaping status: Never Used   Substance and Sexual Activity    Alcohol use: Yes     Alcohol/week: 10.0 standard drinks of alcohol     Types: 10 Cans of beer per week     Comment: current some day     Drug use: Never    Sexual activity: Yes     Partners: Female     Birth control/protection: None        Current Outpatient Medications:     aspirin (Aspirin Low Dose) 81 MG EC tablet, Take 1 tablet by mouth Daily., Disp: 90 tablet, Rfl: 1    atorvastatin (LIPITOR) 40 MG tablet, Take 1 tablet by mouth Daily., Disp: 90 tablet, Rfl: 1    clopidogrel (PLAVIX) 75 MG tablet, Take 1 tablet by mouth Daily., Disp: 90 tablet, Rfl: 1    empagliflozin  "(Jardiance) 25 MG tablet tablet, Take 1 tablet by mouth Daily., Disp: 90 tablet, Rfl: 1    isosorbide mononitrate (IMDUR) 30 MG 24 hr tablet, Take 1 tablet by mouth Daily., Disp: 90 tablet, Rfl: 1    lisinopril (PRINIVIL,ZESTRIL) 2.5 MG tablet, Take 1 tablet by mouth Daily., Disp: 90 tablet, Rfl: 1    metFORMIN ER (GLUCOPHAGE-XR) 500 MG 24 hr tablet, Take 2 tablets by mouth 2 (Two) Times a Day., Disp: 360 tablet, Rfl: 1    metoprolol succinate XL (TOPROL-XL) 25 MG 24 hr tablet, Take 0.5 tablets by mouth Daily., Disp: 45 tablet, Rfl: 1    nitroglycerin (NITROSTAT) 0.4 MG SL tablet, Place 1 tablet under the tongue Every 5 (Five) Minutes As Needed for Chest Pain (Only if SBP Greater Than 100). Take no more than 3 doses in 15 minutes., Disp: 30 tablet, Rfl: 12   Family History   Problem Relation Age of Onset    Cancer Mother     Cancer Father     Heart disease Brother 58        CABG    Cancer Maternal Uncle     Cancer Other     Colon cancer Neg Hx     Malig Hyperthermia Neg Hx           Vital Signs:   Vitals:    07/10/25 1548   BP: 128/70   BP Location: Right arm   Patient Position: Sitting   Cuff Size: Large Adult   Pulse: 103   Temp: 99.3 °F (37.4 °C)   TempSrc: Temporal   SpO2: 95%   Weight: 93.2 kg (205 lb 8 oz)   Height: 171.5 cm (67.5\")       Review of Systems   Constitutional:  Negative for fatigue, malaise/fatigue and weight loss.   Eyes:  Negative for blurred vision.   Respiratory:  Negative for shortness of breath.    Cardiovascular:  Negative for chest pain, palpitations, orthopnea and PND.   Endocrine: Negative for polydipsia, polyphagia and polyuria.   Musculoskeletal:  Negative for myalgias and neck pain.   Neurological:  Negative for dizziness, focal weakness, weakness and headaches.      Physical Exam  Vitals reviewed.   Constitutional:       Appearance: Normal appearance. He is well-developed.   HENT:      Head: Normocephalic and atraumatic.      Right Ear: External ear normal.      Left Ear: External " ear normal.      Mouth/Throat:      Pharynx: No oropharyngeal exudate.   Eyes:      Conjunctiva/sclera: Conjunctivae normal.      Pupils: Pupils are equal, round, and reactive to light.   Cardiovascular:      Rate and Rhythm: Normal rate and regular rhythm.      Pulses: Normal pulses.           Dorsalis pedis pulses are 2+ on the right side and 2+ on the left side.      Heart sounds: Normal heart sounds. No murmur heard.     No friction rub. No gallop.   Pulmonary:      Effort: Pulmonary effort is normal.      Breath sounds: Normal breath sounds. No wheezing or rhonchi.   Abdominal:      General: Abdomen is flat. Bowel sounds are normal. There is no distension.      Palpations: Abdomen is soft. There is no mass.      Tenderness: There is no abdominal tenderness. There is no guarding or rebound.      Hernia: No hernia is present.   Musculoskeletal:         General: Normal range of motion.      Right foot: Deformity and bunion present. No Charcot foot, foot drop or prominent metatarsal heads.      Left foot: Normal range of motion. No deformity, bunion, Charcot foot, foot drop or prominent metatarsal heads.   Feet:      Right foot:      Protective Sensation: 3 sites tested.  3 sites sensed.      Skin integrity: Skin integrity normal. No ulcer, blister or callus.      Toenail Condition: Right toenails are normal.      Left foot:      Protective Sensation: 3 sites tested.  3 sites sensed.      Skin integrity: Skin integrity normal. No ulcer, blister or callus.      Toenail Condition: Left toenails are normal.      Comments:      Skin:     General: Skin is warm and dry.      Capillary Refill: Capillary refill takes less than 2 seconds.   Neurological:      General: No focal deficit present.      Mental Status: He is alert and oriented to person, place, and time.      Cranial Nerves: No cranial nerve deficit.   Psychiatric:         Mood and Affect: Mood and affect normal.         Behavior: Behavior normal.         Thought  Content: Thought content normal.         Judgment: Judgment normal.        Result Review :   The following data was reviewed by: Cesar Pruitt MD on 07/10/2025:  CMP          8/10/2024    08:13 1/18/2025    09:24   CMP   Glucose 161  184    BUN 15  15    Creatinine 1.00  1.08    EGFR 86.2  78.6    Sodium 136  137    Potassium 4.2  4.1    Chloride 102  103    Calcium 9.2  9.1    Total Protein 7.1  6.9    Albumin 4.5  4.2    Globulin 2.6  2.7    Total Bilirubin 0.5  0.6    Alkaline Phosphatase 56  46    AST (SGOT) 24  23    ALT (SGPT) 22  24    Albumin/Globulin Ratio 1.7  1.6    BUN/Creatinine Ratio 15.0  13.9    Anion Gap 10.0  11.0      CBC          8/10/2024    08:13 1/18/2025    09:24   CBC   WBC 5.27  5.76    RBC 4.59  4.64    Hemoglobin 14.6  14.7    Hematocrit 42.2  41.7    MCV 91.9  89.9    MCH 31.8  31.7    MCHC 34.6  35.3    RDW 12.7  12.2    Platelets 154  177      Lipid Panel          8/10/2024    08:13 1/18/2025    09:24   Lipid Panel   Total Cholesterol 145  147    Triglycerides 192  135    HDL Cholesterol 42  47    VLDL Cholesterol 32  24    LDL Cholesterol  71  76    LDL/HDL Ratio 1.54       TSH          8/10/2024    08:13   TSH   TSH 2.230      Most Recent A1C          1/18/2025    09:24   HGBA1C Most Recent   Hemoglobin A1C 8.50      Microalbumin          8/10/2024    08:13   Microalbumin   Microalbumin, Urine 2.6                  Assessment and Plan    Diagnoses and all orders for this visit:    1. Screening PSA (prostate specific antigen) (Primary)  -     PSA Screen    2. History of stroke  -     aspirin (Aspirin Low Dose) 81 MG EC tablet; Take 1 tablet by mouth Daily.  Dispense: 90 tablet; Refill: 1  -     clopidogrel (PLAVIX) 75 MG tablet; Take 1 tablet by mouth Daily.  Dispense: 90 tablet; Refill: 1    3. Mixed hyperlipidemia  -     atorvastatin (LIPITOR) 40 MG tablet; Take 1 tablet by mouth Daily.  Dispense: 90 tablet; Refill: 1  -     Lipid Panel    4. Type 2 diabetes mellitus with  hyperglycemia, without long-term current use of insulin  -     empagliflozin (Jardiance) 25 MG tablet tablet; Take 1 tablet by mouth Daily.  Dispense: 90 tablet; Refill: 1  -     metFORMIN ER (GLUCOPHAGE-XR) 500 MG 24 hr tablet; Take 2 tablets by mouth 2 (Two) Times a Day.  Dispense: 360 tablet; Refill: 1  -     Hemoglobin A1c  -     Microalbumin / Creatinine Urine Ratio - Urine, Clean Catch    5. Primary hypertension  -     isosorbide mononitrate (IMDUR) 30 MG 24 hr tablet; Take 1 tablet by mouth Daily.  Dispense: 90 tablet; Refill: 1  -     lisinopril (PRINIVIL,ZESTRIL) 2.5 MG tablet; Take 1 tablet by mouth Daily.  Dispense: 90 tablet; Refill: 1  -     metoprolol succinate XL (TOPROL-XL) 25 MG 24 hr tablet; Take 0.5 tablets by mouth Daily.  Dispense: 45 tablet; Refill: 1  -     CBC Auto Differential  -     Comprehensive Metabolic Panel  -     TSH+Free T4            Follow Up   Return in about 6 months (around 1/10/2026) for Recheck.  Patient was given instructions and counseling regarding his condition or for health maintenance advice. Please see specific information pulled into the AVS if appropriate.

## 2025-07-10 NOTE — PROGRESS NOTES
..  Venipuncture Blood Specimen Collection  Venipuncture performed in LT arm by Mary Lozano with good hemostasis. Patient tolerated the procedure well without complications.   07/10/25   Dena Judge MA

## (undated) DEVICE — SNAR E/S POLYP SNAREMASTER OVL/10MM 2.8X2300MM YEL

## (undated) DEVICE — CATH DIAG IMPULSE FL4 6F 100CM

## (undated) DEVICE — ELECTRD DEFIB M/FUNC PROPADZ RADIOL 2PK

## (undated) DEVICE — MYNXGRIP 6F/7F: Brand: MYNXGRIP

## (undated) DEVICE — CATH DIAG IMPULSE FR4 6F 100CM

## (undated) DEVICE — THE SINGLE USE ETRAP – POLYP TRAP IS USED FOR SUCTION RETRIEVAL OF ENDOSCOPICALLY REMOVED POLYPS.: Brand: ETRAP

## (undated) DEVICE — GW PTFE EMERALD HEPCOAT FC J TIP STD .035 3MM 150CM

## (undated) DEVICE — PINNACLE INTRODUCER SHEATH: Brand: PINNACLE

## (undated) DEVICE — Device: Brand: DEFENDO AIR/WATER/SUCTION AND BIOPSY VALVE

## (undated) DEVICE — COLON KIT: Brand: MEDLINE INDUSTRIES, INC.

## (undated) DEVICE — SOL IRRG H2O PL/BG 1000ML STRL

## (undated) DEVICE — PK TRY HEART CATH 50